# Patient Record
Sex: MALE | Race: WHITE | URBAN - NONMETROPOLITAN AREA
[De-identification: names, ages, dates, MRNs, and addresses within clinical notes are randomized per-mention and may not be internally consistent; named-entity substitution may affect disease eponyms.]

---

## 2023-09-09 PROBLEM — E03.9 ADULT HYPOTHYROIDISM: Status: ACTIVE | Noted: 2023-09-09

## 2023-09-09 PROBLEM — Z95.0 CARDIAC PACEMAKER IN SITU: Status: ACTIVE | Noted: 2023-09-09

## 2023-09-09 PROBLEM — I48.0 PAROXYSMAL ATRIAL FIBRILLATION (MULTI): Status: ACTIVE | Noted: 2023-09-09

## 2023-09-09 PROBLEM — R68.89 ABNORMAL FINDING: Status: ACTIVE | Noted: 2023-09-09

## 2023-09-09 PROBLEM — I48.21 PERMANENT ATRIAL FIBRILLATION (MULTI): Status: ACTIVE | Noted: 2023-09-09

## 2023-09-09 PROBLEM — I49.5 TACHYCARDIA-BRADYCARDIA (MULTI): Status: ACTIVE | Noted: 2023-09-09

## 2023-09-09 PROBLEM — I10 HTN (HYPERTENSION): Status: ACTIVE | Noted: 2023-09-09

## 2023-09-09 PROBLEM — N52.9 ERECTILE DYSFUNCTION: Status: ACTIVE | Noted: 2023-09-09

## 2023-09-09 PROBLEM — D48.19 NEOPLASM OF UNCERTAIN BEHAVIOR OF SOFT TISSUE: Status: ACTIVE | Noted: 2023-09-09

## 2023-09-09 PROBLEM — R97.20 HIGH PROSTATE SPECIFIC ANTIGEN (PSA): Status: ACTIVE | Noted: 2023-09-09

## 2023-09-09 PROBLEM — M11.80 CALCIUM PYROPHOSPHATE ARTHROPATHY: Status: ACTIVE | Noted: 2023-09-09

## 2023-09-09 PROBLEM — I48.11 LONGSTANDING PERSISTENT ATRIAL FIBRILLATION (MULTI): Status: ACTIVE | Noted: 2023-09-09

## 2023-09-09 PROBLEM — N18.31 CKD STAGE G3A/A2, GFR 45-59 AND ALBUMIN CREATININE RATIO 30-299 MG/G (MULTI): Status: ACTIVE | Noted: 2023-09-09

## 2023-09-09 PROBLEM — D48.19 GIANT CELL TUMOR TENDON: Status: ACTIVE | Noted: 2023-09-09

## 2023-09-09 PROBLEM — N40.0 BPH (BENIGN PROSTATIC HYPERPLASIA): Status: ACTIVE | Noted: 2023-09-09

## 2023-09-09 PROBLEM — M17.31 POST-TRAUMATIC OSTEOARTHRITIS OF RIGHT KNEE: Status: ACTIVE | Noted: 2023-09-09

## 2023-09-09 PROBLEM — C44.91 BASAL CELL CARCINOMA: Status: ACTIVE | Noted: 2023-09-09

## 2023-09-09 RX ORDER — LEVOTHYROXINE SODIUM 75 UG/1
75 TABLET ORAL DAILY
COMMUNITY
End: 2023-10-16 | Stop reason: SDUPTHER

## 2023-09-09 RX ORDER — AMOXICILLIN 250 MG
CAPSULE ORAL 2 TIMES DAILY
COMMUNITY
End: 2024-05-17 | Stop reason: WASHOUT

## 2023-09-09 RX ORDER — NAPROXEN SODIUM 220 MG/1
81 TABLET, FILM COATED ORAL DAILY
COMMUNITY
End: 2024-05-17 | Stop reason: WASHOUT

## 2023-09-09 RX ORDER — GLUCOSAMINE/CHONDR SU A SOD 167-133 MG
500 CAPSULE ORAL DAILY
COMMUNITY
End: 2024-05-17 | Stop reason: WASHOUT

## 2023-09-22 ENCOUNTER — TELEPHONE (OUTPATIENT)
Dept: PRIMARY CARE | Facility: CLINIC | Age: 83
End: 2023-09-22
Payer: MEDICARE

## 2023-09-22 DIAGNOSIS — R35.1 BENIGN PROSTATIC HYPERPLASIA WITH NOCTURIA: ICD-10-CM

## 2023-09-22 DIAGNOSIS — I10 PRIMARY HYPERTENSION: ICD-10-CM

## 2023-09-22 DIAGNOSIS — N18.31 CKD STAGE G3A/A2, GFR 45-59 AND ALBUMIN CREATININE RATIO 30-299 MG/G (MULTI): ICD-10-CM

## 2023-09-22 DIAGNOSIS — N40.1 BENIGN PROSTATIC HYPERPLASIA WITH NOCTURIA: ICD-10-CM

## 2023-09-22 DIAGNOSIS — R97.20 HIGH PROSTATE SPECIFIC ANTIGEN (PSA): ICD-10-CM

## 2023-09-22 DIAGNOSIS — E03.9 ADULT HYPOTHYROIDISM: Primary | ICD-10-CM

## 2023-09-25 ENCOUNTER — LAB (OUTPATIENT)
Dept: LAB | Facility: LAB | Age: 83
End: 2023-09-25
Payer: MEDICARE

## 2023-09-25 DIAGNOSIS — I10 PRIMARY HYPERTENSION: ICD-10-CM

## 2023-09-25 DIAGNOSIS — N40.1 BENIGN PROSTATIC HYPERPLASIA WITH NOCTURIA: ICD-10-CM

## 2023-09-25 DIAGNOSIS — R35.1 BENIGN PROSTATIC HYPERPLASIA WITH NOCTURIA: ICD-10-CM

## 2023-09-25 DIAGNOSIS — E03.9 ADULT HYPOTHYROIDISM: ICD-10-CM

## 2023-09-25 DIAGNOSIS — N18.31 CKD STAGE G3A/A2, GFR 45-59 AND ALBUMIN CREATININE RATIO 30-299 MG/G (MULTI): ICD-10-CM

## 2023-09-25 DIAGNOSIS — R97.20 HIGH PROSTATE SPECIFIC ANTIGEN (PSA): ICD-10-CM

## 2023-09-25 LAB
ALANINE AMINOTRANSFERASE (SGPT) (U/L) IN SER/PLAS: 14 U/L (ref 10–52)
ALBUMIN (G/DL) IN SER/PLAS: 3.8 G/DL (ref 3.4–5)
ALKALINE PHOSPHATASE (U/L) IN SER/PLAS: 48 U/L (ref 33–136)
ANION GAP IN SER/PLAS: 12 MMOL/L (ref 10–20)
ASPARTATE AMINOTRANSFERASE (SGOT) (U/L) IN SER/PLAS: 16 U/L (ref 9–39)
BASOPHILS (10*3/UL) IN BLOOD BY AUTOMATED COUNT: 0.07 X10E9/L (ref 0–0.1)
BASOPHILS/100 LEUKOCYTES IN BLOOD BY AUTOMATED COUNT: 0.9 % (ref 0–2)
BILIRUBIN TOTAL (MG/DL) IN SER/PLAS: 0.7 MG/DL (ref 0–1.2)
CALCIUM (MG/DL) IN SER/PLAS: 8.8 MG/DL (ref 8.6–10.3)
CARBON DIOXIDE, TOTAL (MMOL/L) IN SER/PLAS: 28 MMOL/L (ref 21–32)
CHLORIDE (MMOL/L) IN SER/PLAS: 107 MMOL/L (ref 98–107)
CHOLESTEROL (MG/DL) IN SER/PLAS: 193 MG/DL (ref 0–199)
CHOLESTEROL IN HDL (MG/DL) IN SER/PLAS: 28.8 MG/DL
CHOLESTEROL/HDL RATIO: 6.7
CREATININE (MG/DL) IN SER/PLAS: 1.8 MG/DL (ref 0.5–1.3)
EOSINOPHILS (10*3/UL) IN BLOOD BY AUTOMATED COUNT: 0.36 X10E9/L (ref 0–0.4)
EOSINOPHILS/100 LEUKOCYTES IN BLOOD BY AUTOMATED COUNT: 4.6 % (ref 0–6)
ERYTHROCYTE DISTRIBUTION WIDTH (RATIO) BY AUTOMATED COUNT: 14.3 % (ref 11.5–14.5)
ERYTHROCYTE MEAN CORPUSCULAR HEMOGLOBIN CONCENTRATION (G/DL) BY AUTOMATED: 32 G/DL (ref 32–36)
ERYTHROCYTE MEAN CORPUSCULAR VOLUME (FL) BY AUTOMATED COUNT: 93 FL (ref 80–100)
ERYTHROCYTES (10*6/UL) IN BLOOD BY AUTOMATED COUNT: 4.56 X10E12/L (ref 4.5–5.9)
GFR MALE: 37 ML/MIN/1.73M2
GLUCOSE (MG/DL) IN SER/PLAS: 100 MG/DL (ref 74–99)
HEMATOCRIT (%) IN BLOOD BY AUTOMATED COUNT: 42.2 % (ref 41–52)
HEMOGLOBIN (G/DL) IN BLOOD: 13.5 G/DL (ref 13.5–17.5)
IMMATURE GRANULOCYTES/100 LEUKOCYTES IN BLOOD BY AUTOMATED COUNT: 0.3 % (ref 0–0.9)
LDL: 127 MG/DL (ref 0–99)
LEUKOCYTES (10*3/UL) IN BLOOD BY AUTOMATED COUNT: 7.8 X10E9/L (ref 4.4–11.3)
LYMPHOCYTES (10*3/UL) IN BLOOD BY AUTOMATED COUNT: 2.62 X10E9/L (ref 0.8–3)
LYMPHOCYTES/100 LEUKOCYTES IN BLOOD BY AUTOMATED COUNT: 33.6 % (ref 13–44)
MONOCYTES (10*3/UL) IN BLOOD BY AUTOMATED COUNT: 0.74 X10E9/L (ref 0.05–0.8)
MONOCYTES/100 LEUKOCYTES IN BLOOD BY AUTOMATED COUNT: 9.5 % (ref 2–10)
NEUTROPHILS (10*3/UL) IN BLOOD BY AUTOMATED COUNT: 3.99 X10E9/L (ref 1.6–5.5)
NEUTROPHILS/100 LEUKOCYTES IN BLOOD BY AUTOMATED COUNT: 51.1 % (ref 40–80)
PLATELETS (10*3/UL) IN BLOOD AUTOMATED COUNT: 138 X10E9/L (ref 150–450)
POTASSIUM (MMOL/L) IN SER/PLAS: 4.5 MMOL/L (ref 3.5–5.3)
PROTEIN TOTAL: 6.1 G/DL (ref 6.4–8.2)
SODIUM (MMOL/L) IN SER/PLAS: 142 MMOL/L (ref 136–145)
THYROTROPIN (MIU/L) IN SER/PLAS BY DETECTION LIMIT <= 0.05 MIU/L: 1.72 MIU/L (ref 0.44–3.98)
TRIGLYCERIDE (MG/DL) IN SER/PLAS: 184 MG/DL (ref 0–149)
UREA NITROGEN (MG/DL) IN SER/PLAS: 27 MG/DL (ref 6–23)
VLDL: 37 MG/DL (ref 0–40)

## 2023-09-25 PROCEDURE — 84443 ASSAY THYROID STIM HORMONE: CPT

## 2023-09-25 PROCEDURE — 36415 COLL VENOUS BLD VENIPUNCTURE: CPT

## 2023-09-25 PROCEDURE — 84154 ASSAY OF PSA FREE: CPT

## 2023-09-25 PROCEDURE — 84153 ASSAY OF PSA TOTAL: CPT

## 2023-09-25 PROCEDURE — 80053 COMPREHEN METABOLIC PANEL: CPT

## 2023-09-25 PROCEDURE — 80061 LIPID PANEL: CPT

## 2023-09-25 PROCEDURE — 85025 COMPLETE CBC W/AUTO DIFF WBC: CPT

## 2023-09-28 LAB
PROSTATE SPECIFIC AG (NG/ML) IN SER/PLAS: 6.6 NG/ML (ref 0–4)
PROSTATE SPECIFIC AG FREE (NG/ML) IN SER/PLAS: 1.9 NG/ML
PROSTATE SPECIFIC AG FREE/PROSTATE SPECIFIC AG TOTAL IN SER/PLAS: 29 %

## 2023-10-02 ENCOUNTER — TELEPHONE (OUTPATIENT)
Dept: PRIMARY CARE | Facility: CLINIC | Age: 83
End: 2023-10-02
Payer: MEDICARE

## 2023-10-02 NOTE — TELEPHONE ENCOUNTER
Josie Payne, DO Zandra Smith MA  THYROID IS STABLE  KIDNEY FUNCTION IS DECLINING  LIPID PANEL TOTAL IS GOOD  HDL IS LOW WHICH IS NOT GOOD BUT THIS IS WAY IT USUALLY IS  PSA HAS ACTUALLY GONE DOWN 6.6 WAS 13 LAST YEAR

## 2023-10-12 DIAGNOSIS — I48.11 LONGSTANDING PERSISTENT ATRIAL FIBRILLATION (MULTI): ICD-10-CM

## 2023-10-12 DIAGNOSIS — E03.9 ACQUIRED HYPOTHYROIDISM: ICD-10-CM

## 2023-10-16 RX ORDER — CARVEDILOL 25 MG/1
TABLET ORAL
Qty: 180 TABLET | Refills: 0 | Status: SHIPPED | OUTPATIENT
Start: 2023-10-16 | End: 2024-01-30

## 2023-10-16 RX ORDER — WARFARIN 7.5 MG/1
TABLET ORAL
Qty: 90 TABLET | Refills: 0 | Status: SHIPPED | OUTPATIENT
Start: 2023-10-16 | End: 2024-01-30

## 2023-10-16 RX ORDER — LEVOTHYROXINE SODIUM 75 UG/1
TABLET ORAL
Qty: 90 TABLET | Refills: 0 | Status: SHIPPED | OUTPATIENT
Start: 2023-10-16 | End: 2024-01-30

## 2024-01-30 DIAGNOSIS — E03.9 ACQUIRED HYPOTHYROIDISM: ICD-10-CM

## 2024-01-30 DIAGNOSIS — I48.11 LONGSTANDING PERSISTENT ATRIAL FIBRILLATION (MULTI): ICD-10-CM

## 2024-01-30 RX ORDER — LEVOTHYROXINE SODIUM 75 UG/1
TABLET ORAL
Qty: 90 TABLET | Refills: 0 | Status: SHIPPED | OUTPATIENT
Start: 2024-01-30 | End: 2024-05-10

## 2024-01-30 RX ORDER — WARFARIN 7.5 MG/1
TABLET ORAL
Qty: 90 TABLET | Refills: 0 | Status: SHIPPED | OUTPATIENT
Start: 2024-01-30 | End: 2024-05-07 | Stop reason: SDUPTHER

## 2024-01-30 RX ORDER — CARVEDILOL 25 MG/1
TABLET ORAL
Qty: 180 TABLET | Refills: 0 | Status: SHIPPED | OUTPATIENT
Start: 2024-01-30 | End: 2024-05-07 | Stop reason: SDUPTHER

## 2024-03-21 ENCOUNTER — HOSPITAL ENCOUNTER (OUTPATIENT)
Dept: CARDIOLOGY | Facility: CLINIC | Age: 84
Discharge: HOME | End: 2024-03-21
Payer: MEDICARE

## 2024-03-21 DIAGNOSIS — I48.0 PAROXYSMAL ATRIAL FIBRILLATION (MULTI): ICD-10-CM

## 2024-03-21 DIAGNOSIS — I49.5 TACHYCARDIA-BRADYCARDIA (MULTI): ICD-10-CM

## 2024-03-21 DIAGNOSIS — Z95.0 PRESENCE OF CARDIAC PACEMAKER: Primary | ICD-10-CM

## 2024-03-21 DIAGNOSIS — Z95.0 PRESENCE OF CARDIAC PACEMAKER: ICD-10-CM

## 2024-03-21 PROCEDURE — 93280 PM DEVICE PROGR EVAL DUAL: CPT

## 2024-03-21 PROCEDURE — 93280 PM DEVICE PROGR EVAL DUAL: CPT | Performed by: INTERNAL MEDICINE

## 2024-05-04 DIAGNOSIS — I48.11 LONGSTANDING PERSISTENT ATRIAL FIBRILLATION (MULTI): ICD-10-CM

## 2024-05-06 NOTE — TELEPHONE ENCOUNTER
PLEASE CALL [place].  HE NEEDS SEEN. WAS SUPPOSED TO COME IN WHEN HE HAD HIS LAST LABS DRAWN IN 9/23.

## 2024-05-07 ENCOUNTER — CLINICAL SUPPORT (OUTPATIENT)
Dept: WOUND CARE | Facility: HOSPITAL | Age: 84
End: 2024-05-07
Payer: MEDICARE

## 2024-05-07 DIAGNOSIS — S40.812A ABRASION OF LEFT ARM, INITIAL ENCOUNTER: ICD-10-CM

## 2024-05-07 PROCEDURE — 99213 OFFICE O/P EST LOW 20 MIN: CPT | Mod: 25

## 2024-05-07 PROCEDURE — 11045 DBRDMT SUBQ TISS EACH ADDL: CPT

## 2024-05-07 PROCEDURE — 11042 DBRDMT SUBQ TIS 1ST 20SQCM/<: CPT

## 2024-05-07 RX ORDER — WARFARIN 7.5 MG/1
7.5 TABLET ORAL NIGHTLY
Qty: 30 TABLET | Refills: 0 | Status: SHIPPED | OUTPATIENT
Start: 2024-05-07 | End: 2024-05-17 | Stop reason: SDUPTHER

## 2024-05-07 RX ORDER — CARVEDILOL 25 MG/1
25 TABLET ORAL
Qty: 60 TABLET | Refills: 0 | Status: SHIPPED | OUTPATIENT
Start: 2024-05-07 | End: 2024-05-17 | Stop reason: SDUPTHER

## 2024-05-07 NOTE — TELEPHONE ENCOUNTER
Appointment made.  Donavon has asked if him medications will be sent over to the pharmacy today?

## 2024-05-10 DIAGNOSIS — E03.9 ACQUIRED HYPOTHYROIDISM: ICD-10-CM

## 2024-05-10 RX ORDER — LEVOTHYROXINE SODIUM 75 UG/1
TABLET ORAL
Qty: 90 TABLET | Refills: 0 | Status: SHIPPED | OUTPATIENT
Start: 2024-05-10 | End: 2024-05-17 | Stop reason: SDUPTHER

## 2024-05-14 ENCOUNTER — CLINICAL SUPPORT (OUTPATIENT)
Dept: WOUND CARE | Facility: HOSPITAL | Age: 84
End: 2024-05-14
Payer: MEDICARE

## 2024-05-14 PROCEDURE — 99213 OFFICE O/P EST LOW 20 MIN: CPT

## 2024-05-17 ENCOUNTER — OFFICE VISIT (OUTPATIENT)
Dept: PRIMARY CARE | Facility: CLINIC | Age: 84
End: 2024-05-17
Payer: MEDICARE

## 2024-05-17 VITALS
TEMPERATURE: 97.3 F | BODY MASS INDEX: 30.37 KG/M2 | DIASTOLIC BLOOD PRESSURE: 68 MMHG | WEIGHT: 189 LBS | OXYGEN SATURATION: 99 % | SYSTOLIC BLOOD PRESSURE: 150 MMHG | HEART RATE: 73 BPM | HEIGHT: 66 IN

## 2024-05-17 DIAGNOSIS — Z00.00 ROUTINE GENERAL MEDICAL EXAMINATION AT HEALTH CARE FACILITY: Primary | ICD-10-CM

## 2024-05-17 DIAGNOSIS — I48.11 LONGSTANDING PERSISTENT ATRIAL FIBRILLATION (MULTI): ICD-10-CM

## 2024-05-17 DIAGNOSIS — E03.9 ACQUIRED HYPOTHYROIDISM: ICD-10-CM

## 2024-05-17 PROCEDURE — 1123F ACP DISCUSS/DSCN MKR DOCD: CPT | Performed by: FAMILY MEDICINE

## 2024-05-17 PROCEDURE — 3078F DIAST BP <80 MM HG: CPT | Performed by: FAMILY MEDICINE

## 2024-05-17 PROCEDURE — 3077F SYST BP >= 140 MM HG: CPT | Performed by: FAMILY MEDICINE

## 2024-05-17 PROCEDURE — 1158F ADVNC CARE PLAN TLK DOCD: CPT | Performed by: FAMILY MEDICINE

## 2024-05-17 PROCEDURE — 1159F MED LIST DOCD IN RCRD: CPT | Performed by: FAMILY MEDICINE

## 2024-05-17 PROCEDURE — 1170F FXNL STATUS ASSESSED: CPT | Performed by: FAMILY MEDICINE

## 2024-05-17 PROCEDURE — G0439 PPPS, SUBSEQ VISIT: HCPCS | Performed by: FAMILY MEDICINE

## 2024-05-17 RX ORDER — CARVEDILOL 25 MG/1
25 TABLET ORAL
Qty: 180 TABLET | Refills: 3 | Status: SHIPPED | OUTPATIENT
Start: 2024-05-17 | End: 2025-05-17

## 2024-05-17 RX ORDER — TAMSULOSIN HYDROCHLORIDE 0.4 MG/1
0.4 CAPSULE ORAL
COMMUNITY
Start: 2024-04-25 | End: 2025-04-20

## 2024-05-17 RX ORDER — WARFARIN 7.5 MG/1
7.5 TABLET ORAL NIGHTLY
Qty: 90 TABLET | Refills: 3 | Status: SHIPPED | OUTPATIENT
Start: 2024-05-17 | End: 2025-05-17

## 2024-05-17 RX ORDER — SACUBITRIL AND VALSARTAN 24; 26 MG/1; MG/1
1 TABLET, FILM COATED ORAL 2 TIMES DAILY
COMMUNITY

## 2024-05-17 RX ORDER — LEVOTHYROXINE SODIUM 75 UG/1
TABLET ORAL
Qty: 90 TABLET | Refills: 3 | Status: SHIPPED | OUTPATIENT
Start: 2024-05-17

## 2024-05-17 RX ORDER — SOLIFENACIN SUCCINATE 5 MG/1
10 TABLET, FILM COATED ORAL
COMMUNITY

## 2024-05-17 ASSESSMENT — ENCOUNTER SYMPTOMS
LOSS OF SENSATION IN FEET: 0
OCCASIONAL FEELINGS OF UNSTEADINESS: 1
DEPRESSION: 0

## 2024-05-17 ASSESSMENT — ACTIVITIES OF DAILY LIVING (ADL)
DOING_HOUSEWORK: INDEPENDENT
GROCERY_SHOPPING: INDEPENDENT
TAKING_MEDICATION: INDEPENDENT
MANAGING_FINANCES: INDEPENDENT
DRESSING: INDEPENDENT
BATHING: INDEPENDENT

## 2024-05-17 ASSESSMENT — PATIENT HEALTH QUESTIONNAIRE - PHQ9
2. FEELING DOWN, DEPRESSED OR HOPELESS: NOT AT ALL
1. LITTLE INTEREST OR PLEASURE IN DOING THINGS: NOT AT ALL
SUM OF ALL RESPONSES TO PHQ9 QUESTIONS 1 AND 2: 0

## 2024-05-17 NOTE — PROGRESS NOTES
"Subjective   Patient ID: Donavon Brush is a 83 y.o. male who presents for Medicare Annual Wellness Visit Subsequent.    HPI FOLLOWS WITH UROLOGY  CARDIOLOGY /A FIB AND CHRONIC CHF   HEARD OF AN HORRIFIC EVENT WERE A BRUSH HOG FELL ON THE PATIENT AND HE HELD IT UP WITH HIS OWN STRENGTH AND GOT OUT FROM UNDER IT. HE COULD HAVE BEEN CRUSHED AND THIS A FATAL ACCIDENT. HE WAS FORTUNATE     Review of Systems   Constitutional:  Negative for activity change, appetite change, fever and unexpected weight change.   HENT:  Positive for hearing loss. Negative for congestion, ear pain, postnasal drip, rhinorrhea, sinus pressure and sore throat.    Eyes:  Negative for discharge, itching and visual disturbance.   Respiratory:  Negative for cough, shortness of breath and wheezing.    Cardiovascular:  Negative for chest pain, palpitations and leg swelling.   Gastrointestinal:  Negative for abdominal pain, blood in stool, constipation, diarrhea, nausea and vomiting.   Endocrine: Negative for cold intolerance, heat intolerance and polyuria.   Genitourinary:  Negative for dysuria, flank pain and hematuria.   Musculoskeletal:  Positive for arthralgias and back pain. Negative for gait problem, joint swelling and myalgias.        CHRONICALLY NOT SINCE ACCIDENT    Skin:  Negative for rash.   Allergic/Immunologic: Negative for environmental allergies and food allergies.   Neurological:  Negative for dizziness, syncope, weakness, light-headedness, numbness and headaches.   Psychiatric/Behavioral:  Negative for dysphoric mood and sleep disturbance. The patient is not nervous/anxious.        Objective   /68   Pulse 73   Temp 36.3 °C (97.3 °F)   Ht 1.683 m (5' 6.25\")   Wt 85.7 kg (189 lb)   SpO2 99%   BMI 30.28 kg/m²     Physical Exam  Vitals and nursing note reviewed.   Constitutional:       Appearance: Normal appearance.   HENT:      Head: Normocephalic.   Cardiovascular:      Rate and Rhythm: Normal rate. Rhythm irregular.      " Pulses: Normal pulses.      Heart sounds: Normal heart sounds. No murmur heard.     No friction rub. No gallop.      Comments: CONTROLLED AFIB PACER   Pulmonary:      Effort: Pulmonary effort is normal. No respiratory distress.      Breath sounds: Normal breath sounds. No wheezing.   Abdominal:      General: Bowel sounds are normal. There is no distension.      Palpations: Abdomen is soft.      Tenderness: There is no abdominal tenderness.   Musculoskeletal:         General: No deformity. Normal range of motion.   Skin:     General: Skin is warm and dry.      Capillary Refill: Capillary refill takes less than 2 seconds.   Neurological:      General: No focal deficit present.      Mental Status: He is alert and oriented to person, place, and time.   Psychiatric:         Mood and Affect: Mood normal.         Assessment/Plan   Problem List Items Addressed This Visit             ICD-10-CM    Longstanding persistent atrial fibrillation (Multi) I48.11    Relevant Medications    sacubitriL-valsartan (Entresto) 24-26 mg tablet    carvedilol (Coreg) 25 mg tablet    warfarin (Coumadin) 7.5 mg tablet    Acquired hypothyroidism E03.9    Relevant Medications    levothyroxine (Euthyrox) 75 mcg tablet    Routine general medical examination at health care facility - Primary Z00.00

## 2024-05-27 PROBLEM — Z00.00 ROUTINE GENERAL MEDICAL EXAMINATION AT HEALTH CARE FACILITY: Status: ACTIVE | Noted: 2024-05-27

## 2024-05-27 PROBLEM — E03.9 ACQUIRED HYPOTHYROIDISM: Status: ACTIVE | Noted: 2024-05-27

## 2024-05-27 ASSESSMENT — ENCOUNTER SYMPTOMS
RHINORRHEA: 0
WEAKNESS: 0
DYSURIA: 0
SORE THROAT: 0
DIZZINESS: 0
FLANK PAIN: 0
ARTHRALGIAS: 1
ACTIVITY CHANGE: 0
UNEXPECTED WEIGHT CHANGE: 0
BACK PAIN: 1
SHORTNESS OF BREATH: 0
MYALGIAS: 0
HEMATURIA: 0
BLOOD IN STOOL: 0
HEADACHES: 0
FEVER: 0
NERVOUS/ANXIOUS: 0
COUGH: 0
APPETITE CHANGE: 0
VOMITING: 0
DIARRHEA: 0
SINUS PRESSURE: 0
CONSTIPATION: 0
EYE ITCHING: 0
EYE DISCHARGE: 0
NUMBNESS: 0
JOINT SWELLING: 0
DYSPHORIC MOOD: 0
SLEEP DISTURBANCE: 0
ABDOMINAL PAIN: 0
PALPITATIONS: 0
NAUSEA: 0
LIGHT-HEADEDNESS: 0
WHEEZING: 0

## 2024-07-15 ENCOUNTER — APPOINTMENT (OUTPATIENT)
Dept: PRIMARY CARE | Facility: CLINIC | Age: 84
End: 2024-07-15
Payer: MEDICARE

## 2024-07-15 VITALS
SYSTOLIC BLOOD PRESSURE: 138 MMHG | HEART RATE: 70 BPM | DIASTOLIC BLOOD PRESSURE: 84 MMHG | OXYGEN SATURATION: 97 % | WEIGHT: 187 LBS | BODY MASS INDEX: 29.96 KG/M2 | TEMPERATURE: 96.3 F

## 2024-07-15 DIAGNOSIS — Z00.8 ENCOUNTER FOR MEDICAL CLEARANCE FOR PATIENT HOLD: ICD-10-CM

## 2024-07-15 DIAGNOSIS — R39.14 BENIGN PROSTATIC HYPERPLASIA WITH INCOMPLETE BLADDER EMPTYING: Primary | ICD-10-CM

## 2024-07-15 DIAGNOSIS — N40.1 BENIGN PROSTATIC HYPERPLASIA WITH INCOMPLETE BLADDER EMPTYING: Primary | ICD-10-CM

## 2024-07-15 PROCEDURE — 1159F MED LIST DOCD IN RCRD: CPT | Performed by: FAMILY MEDICINE

## 2024-07-15 PROCEDURE — 3079F DIAST BP 80-89 MM HG: CPT | Performed by: FAMILY MEDICINE

## 2024-07-15 PROCEDURE — 1036F TOBACCO NON-USER: CPT | Performed by: FAMILY MEDICINE

## 2024-07-15 PROCEDURE — 99214 OFFICE O/P EST MOD 30 MIN: CPT | Performed by: FAMILY MEDICINE

## 2024-07-15 PROCEDURE — 3075F SYST BP GE 130 - 139MM HG: CPT | Performed by: FAMILY MEDICINE

## 2024-07-15 ASSESSMENT — ENCOUNTER SYMPTOMS
EYE DISCHARGE: 0
BLOOD IN STOOL: 0
MYALGIAS: 0
RHINORRHEA: 0
WEAKNESS: 0
SHORTNESS OF BREATH: 0
UNEXPECTED WEIGHT CHANGE: 0
DIZZINESS: 0
LIGHT-HEADEDNESS: 0
WHEEZING: 0
NUMBNESS: 0
DIFFICULTY URINATING: 1
ACTIVITY CHANGE: 0
DYSURIA: 0
JOINT SWELLING: 0
HEADACHES: 0
HEMATURIA: 0
EYE ITCHING: 0
SORE THROAT: 0
FLANK PAIN: 0
VOMITING: 0
DIARRHEA: 0
SLEEP DISTURBANCE: 0
ABDOMINAL PAIN: 0
SINUS PRESSURE: 0
DYSPHORIC MOOD: 0
NAUSEA: 0
NERVOUS/ANXIOUS: 0
COUGH: 0
CONSTIPATION: 0
APPETITE CHANGE: 0
FEVER: 0
PALPITATIONS: 1
ARTHRALGIAS: 0

## 2024-07-15 NOTE — PROGRESS NOTES
Subjective   Patient ID: Donavon Brush is a 83 y.o. male who presents for SURGICAL CLEARANCE (SCHEDULED 8/6/24 FOR TRANSURETHRAL RESECTION OF PROSTATE THROUGH [facility]. NEEDS CLEARED WITH COUMADIN).    HPI WILL GET PRE-OP TESTING THIS WEEK   WILL HOLD THE COUMADIN FOR 5 DAYS PRIOR TO THE SURGERY   GETTING PROSTATECTOMY SOON   FOLLOWS CARDIOLOGY     Review of Systems   Constitutional:  Negative for activity change, appetite change, fever and unexpected weight change.   HENT:  Negative for congestion, ear pain, postnasal drip, rhinorrhea, sinus pressure and sore throat.    Eyes:  Negative for discharge, itching and visual disturbance.   Respiratory:  Negative for cough, shortness of breath and wheezing.    Cardiovascular:  Positive for palpitations. Negative for chest pain and leg swelling.   Gastrointestinal:  Negative for abdominal pain, blood in stool, constipation, diarrhea, nausea and vomiting.   Endocrine: Negative for cold intolerance, heat intolerance and polyuria.   Genitourinary:  Positive for difficulty urinating. Negative for dysuria, flank pain and hematuria.   Musculoskeletal:  Negative for arthralgias, gait problem, joint swelling and myalgias.   Skin:  Negative for rash.   Allergic/Immunologic: Negative for environmental allergies and food allergies.   Neurological:  Negative for dizziness, syncope, weakness, light-headedness, numbness and headaches.   Psychiatric/Behavioral:  Negative for dysphoric mood and sleep disturbance. The patient is not nervous/anxious.        Objective   /84   Pulse 70   Temp 35.7 °C (96.3 °F) (Temporal)   Wt 84.8 kg (187 lb)   SpO2 97%   BMI 29.96 kg/m²     Physical Exam  Vitals and nursing note reviewed.   Constitutional:       Appearance: Normal appearance.   HENT:      Head: Normocephalic.   Cardiovascular:      Rate and Rhythm: Normal rate. Rhythm irregular.      Pulses: Normal pulses.      Heart sounds: Normal heart sounds. No murmur heard.     No friction rub. No  gallop.   Pulmonary:      Effort: Pulmonary effort is normal. No respiratory distress.      Breath sounds: Normal breath sounds. No wheezing.   Abdominal:      General: There is no distension.      Tenderness: There is no abdominal tenderness.   Musculoskeletal:         General: No deformity. Normal range of motion.      Right lower leg: Edema present.      Left lower leg: Edema present.   Skin:     General: Skin is warm and dry.      Capillary Refill: Capillary refill takes less than 2 seconds.   Neurological:      General: No focal deficit present.      Mental Status: He is alert and oriented to person, place, and time.   Psychiatric:         Mood and Affect: Mood normal.         Assessment/Plan   Problem List Items Addressed This Visit             ICD-10-CM    BPH (benign prostatic hyperplasia) - Primary N40.0    Encounter for medical clearance for patient hold Z00.8

## 2024-08-23 ENCOUNTER — OFFICE VISIT (OUTPATIENT)
Dept: PRIMARY CARE | Facility: CLINIC | Age: 84
End: 2024-08-23
Payer: MEDICARE

## 2024-08-23 VITALS
HEART RATE: 60 BPM | SYSTOLIC BLOOD PRESSURE: 136 MMHG | TEMPERATURE: 97.2 F | BODY MASS INDEX: 29.47 KG/M2 | DIASTOLIC BLOOD PRESSURE: 68 MMHG | OXYGEN SATURATION: 97 % | WEIGHT: 184 LBS

## 2024-08-23 DIAGNOSIS — I48.0 PAROXYSMAL ATRIAL FIBRILLATION (MULTI): Primary | ICD-10-CM

## 2024-08-23 DIAGNOSIS — H10.33 ACUTE BACTERIAL CONJUNCTIVITIS OF BOTH EYES: ICD-10-CM

## 2024-08-23 PROCEDURE — 99214 OFFICE O/P EST MOD 30 MIN: CPT | Performed by: FAMILY MEDICINE

## 2024-08-23 PROCEDURE — 1159F MED LIST DOCD IN RCRD: CPT | Performed by: FAMILY MEDICINE

## 2024-08-23 PROCEDURE — 3078F DIAST BP <80 MM HG: CPT | Performed by: FAMILY MEDICINE

## 2024-08-23 PROCEDURE — 3075F SYST BP GE 130 - 139MM HG: CPT | Performed by: FAMILY MEDICINE

## 2024-08-23 RX ORDER — ENOXAPARIN SODIUM 100 MG/ML
30 INJECTION SUBCUTANEOUS 2 TIMES DAILY
Qty: 15 EACH | Refills: 0 | Status: SHIPPED | OUTPATIENT
Start: 2024-08-23

## 2024-08-23 RX ORDER — ERYTHROMYCIN 5 MG/G
OINTMENT OPHTHALMIC 4 TIMES DAILY
Qty: 3.5 G | Refills: 0 | Status: SHIPPED | OUTPATIENT
Start: 2024-08-23 | End: 2024-08-30

## 2024-08-23 NOTE — PROGRESS NOTES
Subjective   Patient ID: Donavon Brush is a 84 y.o. male who presents for Eye Drainage, Pre-op Exam (PROSTATE- SIMINOVITCH- WAS TOLD HE SHOULD HAVE LOVENOX-WOULD LIKE TO DISCUSS), and Labs Only (WOULD LIKE TO HAVE A LAB ORDER FOR PT/INR).    HPI OFF COUMADIN OR IN HALF?  9-3-24 /TURP COMPLETE IS PLANNED   NEEDS BRIDGING   MAINLY CAME WITH LEFT EYE INFECTION    Review of Systems   Constitutional:  Negative for activity change, appetite change, fever and unexpected weight change.   HENT:  Positive for ear discharge. Negative for congestion, ear pain, postnasal drip, rhinorrhea, sinus pressure and sore throat.    Eyes:  Negative for discharge, itching and visual disturbance.   Respiratory:  Negative for cough, shortness of breath and wheezing.    Cardiovascular:  Positive for palpitations. Negative for chest pain and leg swelling.   Gastrointestinal:  Negative for abdominal pain, blood in stool, constipation, diarrhea, nausea and vomiting.   Endocrine: Negative for cold intolerance, heat intolerance and polyuria.   Genitourinary:  Positive for difficulty urinating. Negative for dysuria, flank pain and hematuria.   Musculoskeletal:  Negative for arthralgias, gait problem, joint swelling and myalgias.   Skin:  Negative for rash.   Allergic/Immunologic: Negative for environmental allergies and food allergies.   Neurological:  Negative for dizziness, syncope, weakness, light-headedness, numbness and headaches.   Psychiatric/Behavioral:  Negative for dysphoric mood and sleep disturbance. The patient is not nervous/anxious.        Objective   /68   Pulse 60   Temp 36.2 °C (97.2 °F)   Wt 83.5 kg (184 lb)   SpO2 97%   BMI 29.47 kg/m²     Physical Exam  Vitals and nursing note reviewed.   Constitutional:       Appearance: Normal appearance.   HENT:      Head: Normocephalic.   Eyes:      General:         Left eye: Discharge present.     Pupils: Pupils are equal, round, and reactive to light.      Comments: PATIENT HAS  SUNKEN LEFT EYE AND DR PERKINS TOLD HIM HE HAD MUSCLE WEAKNESS AND REFERRED TO AND OPHTHALMOLOGIST   PATIENT IS RELUCTANT TO GO SINCE HE THINKS IT IS JUST INFECTION AND THERE IS REDNESS AND DRAINAGE    Cardiovascular:      Rate and Rhythm: Normal rate and regular rhythm.      Pulses: Normal pulses.      Heart sounds: Normal heart sounds. No murmur heard.     No friction rub. No gallop.   Pulmonary:      Effort: Pulmonary effort is normal. No respiratory distress.      Breath sounds: Normal breath sounds. No wheezing.   Abdominal:      General: Bowel sounds are normal. There is no distension.      Palpations: Abdomen is soft.      Tenderness: There is no abdominal tenderness.   Musculoskeletal:         General: No deformity. Normal range of motion.   Skin:     General: Skin is warm and dry.      Capillary Refill: Capillary refill takes less than 2 seconds.   Neurological:      General: No focal deficit present.      Mental Status: He is alert and oriented to person, place, and time.   Psychiatric:         Mood and Affect: Mood normal.         Assessment/Plan   Problem List Items Addressed This Visit             ICD-10-CM    Paroxysmal atrial fibrillation (Multi) - Primary I48.0    Relevant Medications    enoxaparin (Lovenox) 30 mg/0.3 mL syringe    Other Relevant Orders    Protime-INR     Other Visit Diagnoses         Codes    Acute bacterial conjunctivitis of both eyes     H10.33    Relevant Medications    erythromycin (Romycin) 5 mg/gram (0.5 %) ophthalmic ointment

## 2024-08-28 ASSESSMENT — ENCOUNTER SYMPTOMS
NUMBNESS: 0
COUGH: 0
ABDOMINAL PAIN: 0
SLEEP DISTURBANCE: 0
DYSURIA: 0
HEMATURIA: 0
JOINT SWELLING: 0
MYALGIAS: 0
SORE THROAT: 0
SINUS PRESSURE: 0
WHEEZING: 0
NERVOUS/ANXIOUS: 0
DIZZINESS: 0
RHINORRHEA: 0
UNEXPECTED WEIGHT CHANGE: 0
FLANK PAIN: 0
DIARRHEA: 0
APPETITE CHANGE: 0
ACTIVITY CHANGE: 0
VOMITING: 0
CONSTIPATION: 0
BLOOD IN STOOL: 0
FEVER: 0
ARTHRALGIAS: 0
EYE ITCHING: 0
SHORTNESS OF BREATH: 0
PALPITATIONS: 1
HEADACHES: 0
DYSPHORIC MOOD: 0
EYE DISCHARGE: 0
WEAKNESS: 0
DIFFICULTY URINATING: 1
LIGHT-HEADEDNESS: 0
NAUSEA: 0

## 2024-09-04 ENCOUNTER — HOSPITAL ENCOUNTER (OUTPATIENT)
Dept: CARDIOLOGY | Facility: CLINIC | Age: 84
Discharge: HOME | End: 2024-09-04
Payer: MEDICARE

## 2024-09-04 DIAGNOSIS — I49.5 TACHYCARDIA-BRADYCARDIA (MULTI): ICD-10-CM

## 2024-09-04 DIAGNOSIS — Z95.0 PRESENCE OF CARDIAC PACEMAKER: ICD-10-CM

## 2024-09-04 DIAGNOSIS — I48.0 PAROXYSMAL ATRIAL FIBRILLATION (MULTI): ICD-10-CM

## 2024-09-13 ENCOUNTER — TELEPHONE (OUTPATIENT)
Dept: CARDIOLOGY | Facility: CLINIC | Age: 84
End: 2024-09-13
Payer: MEDICARE

## 2024-09-17 ENCOUNTER — TELEPHONE (OUTPATIENT)
Dept: PRIMARY CARE | Facility: CLINIC | Age: 84
End: 2024-09-17
Payer: MEDICARE

## 2024-09-28 ENCOUNTER — HOSPITAL ENCOUNTER (OUTPATIENT)
Dept: RADIOLOGY | Facility: HOSPITAL | Age: 84
Discharge: HOME | End: 2024-09-28
Payer: MEDICARE

## 2024-09-28 DIAGNOSIS — H53.2 DIPLOPIA: ICD-10-CM

## 2024-09-28 PROCEDURE — 70480 CT ORBIT/EAR/FOSSA W/O DYE: CPT | Performed by: RADIOLOGY

## 2024-09-28 PROCEDURE — 70480 CT ORBIT/EAR/FOSSA W/O DYE: CPT

## 2024-10-01 ENCOUNTER — TELEPHONE (OUTPATIENT)
Dept: CARDIOLOGY | Facility: CLINIC | Age: 84
End: 2024-10-01
Payer: MEDICARE

## 2024-10-17 ENCOUNTER — APPOINTMENT (OUTPATIENT)
Dept: CARDIOLOGY | Facility: CLINIC | Age: 84
End: 2024-10-17
Payer: MEDICARE

## 2024-10-28 ENCOUNTER — TELEPHONE (OUTPATIENT)
Dept: CARDIOLOGY | Facility: CLINIC | Age: 84
End: 2024-10-28
Payer: MEDICARE

## 2024-11-08 ENCOUNTER — HOSPITAL ENCOUNTER (OUTPATIENT)
Dept: RADIOLOGY | Facility: HOSPITAL | Age: 84
Discharge: HOME | End: 2024-11-08
Payer: MEDICARE

## 2024-11-08 ENCOUNTER — HOSPITAL ENCOUNTER (OUTPATIENT)
Dept: CARDIOLOGY | Facility: CLINIC | Age: 84
Discharge: HOME | End: 2024-11-08
Payer: MEDICARE

## 2024-11-08 DIAGNOSIS — Z95.0 PRESENCE OF CARDIAC PACEMAKER: Primary | ICD-10-CM

## 2024-11-08 DIAGNOSIS — Z95.0 PRESENCE OF CARDIAC PACEMAKER: ICD-10-CM

## 2024-11-08 DIAGNOSIS — I48.0 PAROXYSMAL ATRIAL FIBRILLATION (MULTI): ICD-10-CM

## 2024-11-08 DIAGNOSIS — I49.5 TACHYCARDIA-BRADYCARDIA (MULTI): ICD-10-CM

## 2024-11-08 PROCEDURE — 71046 X-RAY EXAM CHEST 2 VIEWS: CPT

## 2024-11-08 PROCEDURE — 93280 PM DEVICE PROGR EVAL DUAL: CPT

## 2024-11-18 ENCOUNTER — TELEPHONE (OUTPATIENT)
Dept: CARDIOLOGY | Facility: CLINIC | Age: 84
End: 2024-11-18
Payer: MEDICARE

## 2024-12-10 ENCOUNTER — OFFICE VISIT (OUTPATIENT)
Dept: CARDIOLOGY | Facility: CLINIC | Age: 84
End: 2024-12-10
Payer: MEDICARE

## 2024-12-10 VITALS
SYSTOLIC BLOOD PRESSURE: 170 MMHG | BODY MASS INDEX: 29.97 KG/M2 | WEIGHT: 187.1 LBS | HEART RATE: 60 BPM | DIASTOLIC BLOOD PRESSURE: 82 MMHG | OXYGEN SATURATION: 99 %

## 2024-12-10 DIAGNOSIS — I35.0 AORTIC VALVE STENOSIS, ETIOLOGY OF CARDIAC VALVE DISEASE UNSPECIFIED: Primary | ICD-10-CM

## 2024-12-10 DIAGNOSIS — I50.9 OTHER CONGESTIVE HEART FAILURE: ICD-10-CM

## 2024-12-10 DIAGNOSIS — I10 HYPERTENSION, UNSPECIFIED TYPE: ICD-10-CM

## 2024-12-10 PROCEDURE — 99214 OFFICE O/P EST MOD 30 MIN: CPT | Performed by: NURSE PRACTITIONER

## 2024-12-10 PROCEDURE — 1036F TOBACCO NON-USER: CPT | Performed by: NURSE PRACTITIONER

## 2024-12-10 PROCEDURE — 3077F SYST BP >= 140 MM HG: CPT | Performed by: NURSE PRACTITIONER

## 2024-12-10 PROCEDURE — 1159F MED LIST DOCD IN RCRD: CPT | Performed by: NURSE PRACTITIONER

## 2024-12-10 PROCEDURE — 99204 OFFICE O/P NEW MOD 45 MIN: CPT | Performed by: NURSE PRACTITIONER

## 2024-12-10 PROCEDURE — 3079F DIAST BP 80-89 MM HG: CPT | Performed by: NURSE PRACTITIONER

## 2024-12-10 ASSESSMENT — ENCOUNTER SYMPTOMS
NEUROLOGICAL NEGATIVE: 1
CONSTITUTIONAL NEGATIVE: 1
GASTROINTESTINAL NEGATIVE: 1
ENDOCRINE NEGATIVE: 1
RESPIRATORY NEGATIVE: 1
MUSCULOSKELETAL NEGATIVE: 1
PSYCHIATRIC NEGATIVE: 1
CARDIOVASCULAR NEGATIVE: 1
HEMATOLOGIC/LYMPHATIC NEGATIVE: 1
EYES NEGATIVE: 1

## 2024-12-10 NOTE — PROGRESS NOTES
Referred by Dr. Roth ref. provider found for New Patient Visit (Establish care.  Previous cardiologist retired.)     History Of Present Illness:    Dear Dr. Payne,     I had the pleasure of meeting Mr. Brush today at [facility] to establish cardiac care.  The patient is seen in collaboration with Dr. Driscoll. Mr. Brush is a very pleasant 84 year old gentleman with a history of chronic atrial fibrillation (Coumadin), hypothyroidism, AVR 20 years ago, PPM, prostate cancer, HTN, hypothyroidism, HFrEF (LVEF 40%) and knee surgery, former smoker. He states overall he has been feeling well. He continues to stay active walking on a regular basis. Denies chest pain, shortness of breath or heart palpitations. Checks Coumadin at home. Blood pressure at home has been well controlled. Scheduled for an MRI for his eye     Review of Systems   Constitutional: Negative.   HENT: Negative.     Eyes: Negative.    Cardiovascular: Negative.    Respiratory: Negative.     Endocrine: Negative.    Hematologic/Lymphatic: Negative.    Skin: Negative.    Musculoskeletal: Negative.    Gastrointestinal: Negative.    Neurological: Negative.    Psychiatric/Behavioral: Negative.        Past Medical History:  He has a past medical history of Chronic sinusitis, unspecified (05/11/2017), Hypothyroidism, unspecified (10/12/2022), Other specified disorders of eye and adnexa (05/08/2019), Personal history of other diseases of the respiratory system (04/10/2015), and Personal history of other diseases of the respiratory system (06/14/2016).    Past Surgical History:  He has a past surgical history that includes Cardiac pacemaker placement (09/22/2014); Knee surgery (09/22/2014); Other surgical history (09/22/2014); and Other surgical history (10/06/2021).      Social History:  He reports that he has quit smoking. His smoking use included cigarettes. He has never used smokeless tobacco. He reports current alcohol use of  about 7.0 standard drinks of alcohol per week. He reports that he does not use drugs.    Family History:  No family history on file.     Allergies:  Patient has no known allergies.    Outpatient Medications:  Current Outpatient Medications   Medication Instructions    carvedilol (COREG) 25 mg, oral, 2 times daily (morning and late afternoon)    enoxaparin (LOVENOX) 30 mg, subcutaneous, 2 times daily    levothyroxine (Euthyrox) 75 mcg tablet TAKE ONE TABLET BY MOUTH once DAILY as directed **need lab work**    sacubitriL-valsartan (Entresto) 24-26 mg tablet 1 tablet, 2 times daily    warfarin (COUMADIN) 7.5 mg, oral, Nightly        Last Recorded Vitals:  Vitals:    12/10/24 1358   BP: 170/82   Pulse: 60   SpO2: 99%   Weight: 84.9 kg (187 lb 1.6 oz)       Physical Exam:  Physical Exam  Vitals reviewed.   HENT:      Head: Normocephalic.      Nose: Nose normal.   Eyes:      Pupils: Pupils are equal, round, and reactive to light.   Cardiovascular:      Rate and Rhythm: Normal rate and regular rhythm. Soft systolic murmur   Pulmonary:      Effort: Pulmonary effort is normal.      Breath sounds: Normal breath sounds.   Abdominal:      General: Abdomen is flat.      Palpations: Abdomen is soft.   Musculoskeletal:         General: Normal range of motion.      Cervical back: Normal range of motion.   Skin:     General: Skin is warm and dry.   Neurological:      General: No focal deficit present.      Mental Status: He is alert and oriented to person, place, and time.   Psychiatric:         Mood and Affect: Mood normal.            Last Labs:  CBC -  Lab Results   Component Value Date    WBC 7.8 09/25/2023    HGB 13.5 09/25/2023    HCT 42.2 09/25/2023    MCV 93 09/25/2023     (L) 09/25/2023       CMP -  Lab Results   Component Value Date    CALCIUM 8.8 09/25/2023    PROT 6.1 (L) 09/25/2023    ALBUMIN 3.8 09/25/2023    AST 16 09/25/2023    ALT 14 09/25/2023    ALKPHOS 48 09/25/2023    BILITOT 0.7 09/25/2023       LIPID  "PANEL -   Lab Results   Component Value Date    CHOL 193 09/25/2023    TRIG 184 (H) 09/25/2023    HDL 28.8 (A) 09/25/2023    CHHDL 6.7 (A) 09/25/2023    LDLF 127 (H) 09/25/2023    VLDL 37 09/25/2023    NHDL 185 09/23/2021       RENAL FUNCTION PANEL -   Lab Results   Component Value Date    GLUCOSE 100 (H) 09/25/2023     09/25/2023    K 4.5 09/25/2023     09/25/2023    CO2 28 09/25/2023    ANIONGAP 12 09/25/2023    BUN 27 (H) 09/25/2023    CREATININE 1.80 (H) 09/25/2023    GFRMALE 37 (A) 09/25/2023    CALCIUM 8.8 09/25/2023    ALBUMIN 3.8 09/25/2023        No results found for: \"BNP\", \"HGBA1C\"    Last Cardiology Tests:  ECG:    Echo:  Echocardiogram 6/2/2022  moderate left ventricle dysfunction estimated fraction 40%     normal RV function  pacemaker wire in the right ventricle  Normal  dimensions of the left ventricle and right ventricle  Moderately to severely dilated left atrium / moderately dilated right  atrium  Posterior mitral valve annular calcification  The proximal ascending aorta measured up to 3.7 centimeter  Trace to mild mitral regurgitation  tricuspid valve Normal structurally   bioprosthetic valve in the aortic position is seated well.  No aortic  regurgitation V max 1.1 m/sec,      calculated ESTHER 2.6 cm2,p/m gradient 5/2mmHg on 2 D images  excursions appears normal.  No pericardial effusion  LVEF 40%    Cath:    Stress Test:    Cardiac Imaging:      Assessment/Plan   Mr. Brush is a very pleasant 84 year old gentleman with a history of chronic atrial fibrillation (Coumadin), HFrEF (LVEF 40%), AVR, HTN and PPM, he is here to establish cardiac care. Denies chest pain or shortness of breath. Continues to stay active on a regular basis. Heart rate and blood pressure are well controlled at today's office visit    Plan   -call with any questions   -follow up in six months   -repeat echocardiogram in six months   -continue Coumadin, currently monitors at home   -continue Carvedilol 25 mg twice " a day and Entresto 24-26 mg twice a day     I appreciate the opportunity to participate in the patient's care, please call with any questions         Jennie Mendez, APRN-CNP

## 2024-12-10 NOTE — PATIENT INSTRUCTIONS
CALL WITH ANY QUESTIONS   NO MEDICATION CHANGES   FOLLOW UP IN SIX MONTHS   ECHOCARDIOGRAM AT NEXT OFFICE VISIT

## 2024-12-16 ENCOUNTER — TELEPHONE (OUTPATIENT)
Dept: CARDIOLOGY | Facility: CLINIC | Age: 84
End: 2024-12-16
Payer: MEDICARE

## 2024-12-16 DIAGNOSIS — I50.9 OTHER CONGESTIVE HEART FAILURE: Primary | ICD-10-CM

## 2024-12-18 RX ORDER — SACUBITRIL AND VALSARTAN 24; 26 MG/1; MG/1
1 TABLET, FILM COATED ORAL 2 TIMES DAILY
Qty: 60 TABLET | Refills: 5 | Status: SHIPPED | OUTPATIENT
Start: 2024-12-18

## 2024-12-19 ENCOUNTER — HOSPITAL ENCOUNTER (OUTPATIENT)
Dept: CARDIOLOGY | Facility: HOSPITAL | Age: 84
Discharge: HOME | End: 2024-12-19
Payer: MEDICARE

## 2024-12-19 ENCOUNTER — HOSPITAL ENCOUNTER (OUTPATIENT)
Dept: RADIOLOGY | Facility: HOSPITAL | Age: 84
Discharge: HOME | End: 2024-12-19
Payer: MEDICARE

## 2024-12-19 VITALS
SYSTOLIC BLOOD PRESSURE: 144 MMHG | OXYGEN SATURATION: 99 % | DIASTOLIC BLOOD PRESSURE: 80 MMHG | RESPIRATION RATE: 20 BRPM | HEART RATE: 65 BPM

## 2024-12-19 DIAGNOSIS — H53.2 DIPLOPIA: ICD-10-CM

## 2024-12-19 DIAGNOSIS — H57.12 OCULAR PAIN, LEFT EYE: ICD-10-CM

## 2024-12-19 DIAGNOSIS — H05.413: ICD-10-CM

## 2024-12-19 LAB
BODY SURFACE AREA: 1.96 M2
BODY SURFACE AREA: 1.96 M2

## 2024-12-19 PROCEDURE — 70553 MRI BRAIN STEM W/O & W/DYE: CPT

## 2024-12-19 PROCEDURE — 70553 MRI BRAIN STEM W/O & W/DYE: CPT | Performed by: RADIOLOGY

## 2024-12-19 PROCEDURE — 2550000001 HC RX 255 CONTRASTS: Performed by: OPHTHALMOLOGY

## 2024-12-19 PROCEDURE — 70543 MRI ORBT/FAC/NCK W/O &W/DYE: CPT | Performed by: RADIOLOGY

## 2024-12-19 PROCEDURE — A9575 INJ GADOTERATE MEGLUMI 0.1ML: HCPCS | Performed by: OPHTHALMOLOGY

## 2024-12-19 PROCEDURE — 93286 PERI-PX EVAL PM/LDLS PM IP: CPT

## 2024-12-19 PROCEDURE — 70543 MRI ORBT/FAC/NCK W/O &W/DYE: CPT

## 2024-12-19 RX ORDER — GADOTERATE MEGLUMINE 376.9 MG/ML
20 INJECTION INTRAVENOUS
Status: COMPLETED | OUTPATIENT
Start: 2024-12-19 | End: 2024-12-19

## 2024-12-19 NOTE — NURSING NOTE
0892 Arrival of device clinic nurse to interrogate patient's pacemaker for MRI scan.  Consent obtained by radiologist. ABBI

## 2024-12-20 ENCOUNTER — TELEPHONE (OUTPATIENT)
Dept: CARDIOLOGY | Facility: CLINIC | Age: 84
End: 2024-12-20
Payer: MEDICARE

## 2024-12-20 NOTE — TELEPHONE ENCOUNTER
Patient called regarding his entresto but it was already sent over to the pharmacy with a new prescription written

## 2024-12-23 ENCOUNTER — TELEPHONE (OUTPATIENT)
Dept: CARDIOLOGY | Facility: CLINIC | Age: 84
End: 2024-12-23
Payer: MEDICARE

## 2025-01-07 ENCOUNTER — HOSPITAL ENCOUNTER (OUTPATIENT)
Dept: CARDIOLOGY | Facility: CLINIC | Age: 85
Discharge: HOME | End: 2025-01-07
Payer: MEDICARE

## 2025-01-07 DIAGNOSIS — Z95.0 PRESENCE OF CARDIAC PACEMAKER: ICD-10-CM

## 2025-01-07 DIAGNOSIS — I48.0 PAROXYSMAL ATRIAL FIBRILLATION (MULTI): ICD-10-CM

## 2025-01-07 DIAGNOSIS — I49.5 TACHYCARDIA-BRADYCARDIA (MULTI): ICD-10-CM

## 2025-01-08 ENCOUNTER — TELEPHONE (OUTPATIENT)
Dept: PRIMARY CARE | Facility: CLINIC | Age: 85
End: 2025-01-08
Payer: MEDICARE

## 2025-01-13 ENCOUNTER — APPOINTMENT (OUTPATIENT)
Dept: PRIMARY CARE | Facility: CLINIC | Age: 85
End: 2025-01-13
Payer: MEDICARE

## 2025-01-13 VITALS
OXYGEN SATURATION: 99 % | WEIGHT: 186 LBS | BODY MASS INDEX: 29.13 KG/M2 | SYSTOLIC BLOOD PRESSURE: 126 MMHG | HEART RATE: 70 BPM | TEMPERATURE: 97.9 F | DIASTOLIC BLOOD PRESSURE: 70 MMHG

## 2025-01-13 DIAGNOSIS — C85.90 LYMPHOMA, UNSPECIFIED BODY REGION, UNSPECIFIED LYMPHOMA TYPE (MULTI): ICD-10-CM

## 2025-01-13 DIAGNOSIS — C44.91 BASAL CELL CARCINOMA (BCC), UNSPECIFIED SITE: ICD-10-CM

## 2025-01-13 DIAGNOSIS — H57.12 PAIN OF LEFT EYE: Primary | ICD-10-CM

## 2025-01-13 DIAGNOSIS — H05.412 ENOPHTHALMOS DUE TO ATROPHY OF LEFT ORBITAL TISSUE: ICD-10-CM

## 2025-01-13 DIAGNOSIS — D64.9 ANEMIA, UNSPECIFIED TYPE: Primary | ICD-10-CM

## 2025-01-13 PROCEDURE — 1123F ACP DISCUSS/DSCN MKR DOCD: CPT | Performed by: FAMILY MEDICINE

## 2025-01-13 PROCEDURE — 99213 OFFICE O/P EST LOW 20 MIN: CPT | Performed by: FAMILY MEDICINE

## 2025-01-13 PROCEDURE — 1159F MED LIST DOCD IN RCRD: CPT | Performed by: FAMILY MEDICINE

## 2025-01-13 PROCEDURE — 3078F DIAST BP <80 MM HG: CPT | Performed by: FAMILY MEDICINE

## 2025-01-13 PROCEDURE — 3074F SYST BP LT 130 MM HG: CPT | Performed by: FAMILY MEDICINE

## 2025-01-13 RX ORDER — ERYTHROMYCIN 5 MG/G
OINTMENT OPHTHALMIC EVERY 6 HOURS
Qty: 3.5 G | Refills: 1 | Status: SHIPPED | OUTPATIENT
Start: 2025-01-13

## 2025-01-13 RX ORDER — ERYTHROMYCIN 5 MG/G
OINTMENT OPHTHALMIC
COMMUNITY
Start: 2024-11-05 | End: 2025-01-13 | Stop reason: SDUPTHER

## 2025-01-13 RX ORDER — ERYTHROMYCIN 5 MG/G
OINTMENT OPHTHALMIC EVERY 6 HOURS
Qty: 3.5 G | Refills: 1 | Status: SHIPPED | OUTPATIENT
Start: 2025-01-13 | End: 2025-01-13

## 2025-01-13 NOTE — PROGRESS NOTES
AT the request of Dr. Ruth Ann Chris, an URGENT referral to [facility] has been placed for his orbital mass.

## 2025-01-14 ENCOUNTER — HOSPITAL ENCOUNTER (OUTPATIENT)
Dept: RADIOLOGY | Facility: HOSPITAL | Age: 85
Discharge: HOME | End: 2025-01-14
Payer: MEDICARE

## 2025-01-14 DIAGNOSIS — C85.90 LYMPHOMA, UNSPECIFIED BODY REGION, UNSPECIFIED LYMPHOMA TYPE (MULTI): ICD-10-CM

## 2025-01-14 PROBLEM — H57.12 PAIN OF LEFT EYE: Status: ACTIVE | Noted: 2025-01-14

## 2025-01-14 PROCEDURE — A9552 F18 FDG: HCPCS | Performed by: INTERNAL MEDICINE

## 2025-01-14 PROCEDURE — 78815 PET IMAGE W/CT SKULL-THIGH: CPT | Mod: PET TUMOR INIT TX STRAT | Performed by: RADIOLOGY

## 2025-01-14 PROCEDURE — 3430000001 HC RX 343 DIAGNOSTIC RADIOPHARMACEUTICALS: Performed by: INTERNAL MEDICINE

## 2025-01-14 PROCEDURE — 78815 PET IMAGE W/CT SKULL-THIGH: CPT | Mod: PI

## 2025-01-14 RX ORDER — FLUDEOXYGLUCOSE F 18 200 MCI/ML
14.3 INJECTION, SOLUTION INTRAVENOUS
Status: COMPLETED | OUTPATIENT
Start: 2025-01-14 | End: 2025-01-14

## 2025-01-14 RX ADMIN — FLUDEOXYGLUCOSE F 18 14.3 MILLICURIE: 200 INJECTION, SOLUTION INTRAVENOUS at 12:45

## 2025-01-14 NOTE — PROGRESS NOTES
"Subjective   Patient ID: Donavon Brush is a 84 y.o. male who presents for Eye Problem (Medication refill due to eye \"falling in\").    Eye Problem      SAW DR JACQUELINE ZAMBRANO, OPHTHALMOLOGY CONCERNING HIS EYE (POTENTIALLY INFECTED AND RETRACTED INTO HIS SOCKET)  CT REVEALS ABNORMALITIES THAT MAY BE MALIGNANT AND HE NEEDS A PT/CT   PATIENT HER TO DISCUSS IF THAT NEEDS DONE   [facility] ALREADY HAS HIM SCHEDULED WITH DR MONTILLA, ONCOLOGY   PATIENT IS RELUCTANT   REVIEWED THE DRS NOTES AND CT WITH HIM AND ADVISED FOLLOWING THE PLAN     Review of SystemsSINCE THE  VISIT NO INTERVAL HISTORICAL CHANGES IN ANY OF THE 12 SYSTEMS REVIEWED OTHER THAN WORSENING  ISSUES WITH THE LEFT EYE     Objective   /70   Pulse 70   Temp 36.6 °C (97.9 °F)   Wt 84.4 kg (186 lb)   SpO2 99%   BMI 29.13 kg/m²     Physical ExamSINCE RECENT VISIT NO INTERVAL PHYSICAL CHANGES IN ANY OF THE 12 SYSTEMS REVIEWED OTHER THAN WORSENING VISION AND HE THINKS INFECTION (NONE SEEN)    Assessment/Plan   Problem List Items Addressed This Visit             ICD-10-CM    Pain of left eye - Primary H57.12    Relevant Medications    erythromycin (Romycin) 5 mg/gram (0.5 %) ophthalmic ointment     Other Visit Diagnoses         Codes    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

## 2025-01-16 ENCOUNTER — LAB (OUTPATIENT)
Dept: LAB | Facility: CLINIC | Age: 85
End: 2025-01-16
Payer: MEDICARE

## 2025-01-16 ENCOUNTER — OFFICE VISIT (OUTPATIENT)
Dept: HEMATOLOGY/ONCOLOGY | Facility: CLINIC | Age: 85
End: 2025-01-16
Payer: MEDICARE

## 2025-01-16 VITALS
BODY MASS INDEX: 29.04 KG/M2 | RESPIRATION RATE: 18 BRPM | HEART RATE: 62 BPM | DIASTOLIC BLOOD PRESSURE: 73 MMHG | TEMPERATURE: 97.5 F | WEIGHT: 185.41 LBS | OXYGEN SATURATION: 99 % | SYSTOLIC BLOOD PRESSURE: 158 MMHG

## 2025-01-16 DIAGNOSIS — R68.89 ABNORMAL FINDING: ICD-10-CM

## 2025-01-16 DIAGNOSIS — D64.9 ANEMIA, UNSPECIFIED TYPE: ICD-10-CM

## 2025-01-16 LAB
ALBUMIN SERPL BCP-MCNC: 3.9 G/DL (ref 3.4–5)
ALP SERPL-CCNC: 53 U/L (ref 33–136)
ALT SERPL W P-5'-P-CCNC: 9 U/L (ref 10–52)
ANION GAP SERPL CALC-SCNC: 11 MMOL/L (ref 10–20)
AST SERPL W P-5'-P-CCNC: 13 U/L (ref 9–39)
BASOPHILS # BLD AUTO: 0.06 X10*3/UL (ref 0–0.1)
BASOPHILS NFR BLD AUTO: 0.6 %
BILIRUB SERPL-MCNC: 0.8 MG/DL (ref 0–1.2)
BUN SERPL-MCNC: 27 MG/DL (ref 6–23)
CALCIUM SERPL-MCNC: 9.5 MG/DL (ref 8.6–10.3)
CHLORIDE SERPL-SCNC: 105 MMOL/L (ref 98–107)
CO2 SERPL-SCNC: 30 MMOL/L (ref 21–32)
CREAT SERPL-MCNC: 1.4 MG/DL (ref 0.5–1.3)
EGFRCR SERPLBLD CKD-EPI 2021: 50 ML/MIN/1.73M*2
EOSINOPHIL # BLD AUTO: 0.33 X10*3/UL (ref 0–0.4)
EOSINOPHIL NFR BLD AUTO: 3.4 %
ERYTHROCYTE [DISTWIDTH] IN BLOOD BY AUTOMATED COUNT: 14.1 % (ref 11.5–14.5)
FERRITIN SERPL-MCNC: 219 NG/ML (ref 20–300)
FOLATE SERPL-MCNC: 15.3 NG/ML
GLUCOSE SERPL-MCNC: 83 MG/DL (ref 74–99)
HCT VFR BLD AUTO: 43.4 % (ref 41–52)
HGB BLD-MCNC: 13.9 G/DL (ref 13.5–17.5)
IMM GRANULOCYTES # BLD AUTO: 0.03 X10*3/UL (ref 0–0.5)
IMM GRANULOCYTES NFR BLD AUTO: 0.3 % (ref 0–0.9)
IRON SATN MFR SERPL: 25 % (ref 25–45)
IRON SERPL-MCNC: 67 UG/DL (ref 35–150)
LDH SERPL L TO P-CCNC: 162 U/L (ref 84–246)
LYMPHOCYTES # BLD AUTO: 5.02 X10*3/UL (ref 0.8–3)
LYMPHOCYTES NFR BLD AUTO: 51.6 %
MCH RBC QN AUTO: 28.7 PG (ref 26–34)
MCHC RBC AUTO-ENTMCNC: 32 G/DL (ref 32–36)
MCV RBC AUTO: 90 FL (ref 80–100)
MONOCYTES # BLD AUTO: 0.77 X10*3/UL (ref 0.05–0.8)
MONOCYTES NFR BLD AUTO: 7.9 %
NEUTROPHILS # BLD AUTO: 3.51 X10*3/UL (ref 1.6–5.5)
NEUTROPHILS NFR BLD AUTO: 36.2 %
NRBC BLD-RTO: 0 /100 WBCS (ref 0–0)
PLATELET # BLD AUTO: 145 X10*3/UL (ref 150–450)
POTASSIUM SERPL-SCNC: 4.6 MMOL/L (ref 3.5–5.3)
PROT SERPL-MCNC: 6.6 G/DL (ref 6.4–8.2)
RBC # BLD AUTO: 4.85 X10*6/UL (ref 4.5–5.9)
SODIUM SERPL-SCNC: 141 MMOL/L (ref 136–145)
TIBC SERPL-MCNC: 269 UG/DL (ref 240–445)
UIBC SERPL-MCNC: 202 UG/DL (ref 110–370)
URATE SERPL-MCNC: 8.2 MG/DL (ref 4–7.5)
VIT B12 SERPL-MCNC: 539 PG/ML (ref 211–911)
WBC # BLD AUTO: 9.7 X10*3/UL (ref 4.4–11.3)

## 2025-01-16 PROCEDURE — 99205 OFFICE O/P NEW HI 60 MIN: CPT | Performed by: INTERNAL MEDICINE

## 2025-01-16 PROCEDURE — 3078F DIAST BP <80 MM HG: CPT | Performed by: INTERNAL MEDICINE

## 2025-01-16 PROCEDURE — 1126F AMNT PAIN NOTED NONE PRSNT: CPT | Performed by: INTERNAL MEDICINE

## 2025-01-16 PROCEDURE — 36415 COLL VENOUS BLD VENIPUNCTURE: CPT

## 2025-01-16 PROCEDURE — 1123F ACP DISCUSS/DSCN MKR DOCD: CPT | Performed by: INTERNAL MEDICINE

## 2025-01-16 PROCEDURE — 82728 ASSAY OF FERRITIN: CPT

## 2025-01-16 PROCEDURE — 99215 OFFICE O/P EST HI 40 MIN: CPT | Mod: 25 | Performed by: INTERNAL MEDICINE

## 2025-01-16 PROCEDURE — 85025 COMPLETE CBC W/AUTO DIFF WBC: CPT

## 2025-01-16 PROCEDURE — 1159F MED LIST DOCD IN RCRD: CPT | Performed by: INTERNAL MEDICINE

## 2025-01-16 PROCEDURE — 1160F RVW MEDS BY RX/DR IN RCRD: CPT | Performed by: INTERNAL MEDICINE

## 2025-01-16 PROCEDURE — 83615 LACTATE (LD) (LDH) ENZYME: CPT

## 2025-01-16 PROCEDURE — 82607 VITAMIN B-12: CPT

## 2025-01-16 PROCEDURE — 83540 ASSAY OF IRON: CPT

## 2025-01-16 PROCEDURE — 84550 ASSAY OF BLOOD/URIC ACID: CPT

## 2025-01-16 PROCEDURE — 82746 ASSAY OF FOLIC ACID SERUM: CPT

## 2025-01-16 PROCEDURE — 3077F SYST BP >= 140 MM HG: CPT | Performed by: INTERNAL MEDICINE

## 2025-01-16 PROCEDURE — 84075 ASSAY ALKALINE PHOSPHATASE: CPT

## 2025-01-16 ASSESSMENT — PAIN SCALES - GENERAL: PAINLEVEL_OUTOF10: 0-NO PAIN

## 2025-01-16 ASSESSMENT — COLUMBIA-SUICIDE SEVERITY RATING SCALE - C-SSRS
6. HAVE YOU EVER DONE ANYTHING, STARTED TO DO ANYTHING, OR PREPARED TO DO ANYTHING TO END YOUR LIFE?: NO
2. HAVE YOU ACTUALLY HAD ANY THOUGHTS OF KILLING YOURSELF?: NO
1. IN THE PAST MONTH, HAVE YOU WISHED YOU WERE DEAD OR WISHED YOU COULD GO TO SLEEP AND NOT WAKE UP?: NO

## 2025-01-16 NOTE — TELEPHONE ENCOUNTER
Pt states he needs a refill on the antibiotic for an eye infection.  He needs seen please call.  Last script sent over in 8/24.   Detail Level: Detailed Quality 226: Preventive Care And Screening: Tobacco Use: Screening And Cessation Intervention: Patient screened for tobacco use and is an ex/non-smoker

## 2025-01-16 NOTE — PROGRESS NOTES
Pt seen in office today for a new patient  visit with Dr. Ruth Ann Chris for management of his abnormal MRI of the orbit/ brain.  HE is without complaints today and denies pain.     Medications, pharmacy preference and allergies were reviewed with patient and updated in the medical record by MD.     He had a PET on 1/14/25 and has been referred to see [facility] ophthalmology, Dr. Ike Bradley on 1/17/25. He had labs obtained today prior to his visit.He is to have a STAT referral to Dr. Addy Santana in general surgery for a surgical excisional left supraclavicular ln biopsy. He will RTC In 4 weeks to review findings and formulate a treatment plan.    Our contact information was given to patient and they were encouraged to contact us with any questions or concerns.     Patient verbalized understanding and agreement regarding discussed information via verbal feedback. Pt escorted to scheduling.

## 2025-01-16 NOTE — PROGRESS NOTES
Patient ID: Donavon Brush is a 84 y.o. male.  Referring Physician: No referring provider defined for this encounter.  Primary Care Provider: Josie Payne DO  Referral Reason: Orbital mass    Subjective:  Sunken eye    Heme/Onc History:    - MRI Orbit 12/19/24 IMPRESSION: Diffuse loss of left orbital intraconal fat with associated enhancement and significant enophthalmos of the left globe. Enhancement extends into the left superior orbital fissure and optic canal. Differential includes metastatic disease versus an inflammatory process such as Shannon Hunt syndrome, sarcoidosis, or orbital pseudotumor.    PET CT 01/14/25: IMPRESSION: 1. Conglomerated FDG avid left infraclavicular, mediastinal, bilateral internal iliac (right> left) and retroperitoneal lymph nodes, consistent with lymphomatous involvement. 2. FDG avid soft tissue mass posterolateral to left kidney, consistent with lymphomatous involvement. 3. Multiple FDG avid bilateral pulmonary nodules, concerning for extranodal disease. 4. FDG uptake within right supraclavicular region, likely  postsurgical. 5. Nonspecific FDG uptake within the aortic root, likely postprocedural. 6. Brain lesion seen on MRI brain dated 12/19/2024 not well appreciated on PET. Correlation with MRI brain is recommended.      Past Medical History:   Past Medical History:   Diagnosis Date    Chronic sinusitis, unspecified 05/11/2017    Sinobronchitis    Hypothyroidism, unspecified 10/12/2022    Adult hypothyroidism    Other specified disorders of eye and adnexa 05/08/2019    Irritation of right eye    Personal history of other diseases of the respiratory system 04/10/2015    History of acute bronchitis    Personal history of other diseases of the respiratory system 06/14/2016    History of acute sinusitis     Social History:   Social History     Socioeconomic History    Marital status:      Spouse name: Not on file    Number of children: Not on file    Years of education: Not  on file    Highest education level: Not on file   Occupational History    Not on file   Tobacco Use    Smoking status: Former     Types: Cigarettes    Smokeless tobacco: Never   Vaping Use    Vaping status: Never Used   Substance and Sexual Activity    Alcohol use: Yes     Alcohol/week: 7.0 standard drinks of alcohol     Types: 7 Cans of beer per week    Drug use: Never    Sexual activity: Not on file   Other Topics Concern    Not on file   Social History Narrative    Not on file     Social Drivers of Health     Financial Resource Strain: Not on file   Food Insecurity: No Food Insecurity (9/4/2024)    Received from [facility]    Hunger Vital Sign     Worried About Running Out of Food in the Last Year: Never true     Ran Out of Food in the Last Year: Never true   Transportation Needs: No Transportation Needs (9/4/2024)    Received from [facility]    PRAPARE - Transportation     Lack of Transportation (Medical): No     Lack of Transportation (Non-Medical): No   Physical Activity: Not on file   Stress: Not on file   Social Connections: Not on file   Intimate Partner Violence: Not on file   Housing Stability: Unknown (9/4/2024)    Received from [facility]    Housing Stability Vital Sign     Unable to Pay for Housing in the Last Year: No     Number of Times Moved in the Last Year: Not on file     Homeless in the Last Year: No     Surgical History:   Past Surgical History:   Procedure Laterality Date    CARDIAC PACEMAKER PLACEMENT  09/22/2014    Pacemaker Placement    KNEE SURGERY  09/22/2014    Knee Surgery    OTHER SURGICAL HISTORY  09/22/2014    Heart Valve Replacement    OTHER SURGICAL HISTORY  10/06/2021    Cataract surgery    PROSTATE SURGERY       Family History:   Family History   Problem Relation Name Age of Onset    Alzheimer's disease Mother          passed at 84    No Known Problems Father          passed at 66    Heart disease Brother Enrrique     No Known Problems Daughter      No Known  Problems Son      No Known Problems Son        reports that he has quit smoking. His smoking use included cigarettes. He has never used smokeless tobacco.  Oncology Family history: Cancer-related family history is not on file.    Review Of Systems:  As stated per in HPI; otherwise all other 12 point ROS are negative    Physical Exam:  /73 (BP Location: Right arm, Patient Position: Sitting, BP Cuff Size: Adult long)   Pulse 62   Temp 36.4 °C (97.5 °F) (Temporal)   Resp 18   Wt 84.1 kg (185 lb 6.5 oz) Comment: pt refused to take shoes off  SpO2 99%   BMI 29.04 kg/m²   BSA: 1.99 meters squared  General: awake/alert/oriented x3, no distress, alert and cooperative  Head: Short hair fully covering scalp. Symmetric facial expressions  Eyes: PERRL, EOMI, clear sclera, eyebrows present.  Ears/Nose/Mouth/Throat:  Oral mucous membranes moist. No oral ulcers. No palpable pre/post-auricular lymph nodes  Neck: No palpable cervical chain lymph nodes  Respiratory: unlabored breathing on room air, good chest expansion, thorax symmetric  Cardio: Regular rate and rhythm, normal S1 and S2, radial pulses symmetric  GI: Nondistended, soft, non-tender abdomen  Musculoskeletal: Normal muscle bulk and tone, ROM intact, no joint swelling.  Rises from chair and walks unassisted.  Extremities: No ankle swelling, no arm or leg wounds  Neuro: Alert, cognition intact, speech normal. Facial expressions symmetric.  No motor deficits noted. Sensation intact to touch and hot/cold.   Able to stand from seated position unassisted and walks around the room unassisted.  Psychological: Appropriate mood and behavior.  Skin: Warm and dry, no lesions, no rashes    Results:  Diagnostic Results   Lab Results   Component Value Date    WBC 9.7 01/16/2025    HGB 13.9 01/16/2025    HCT 43.4 01/16/2025    MCV 90 01/16/2025     (L) 01/16/2025     Lab Results   Component Value Date    CALCIUM 9.5 01/16/2025     01/16/2025    K 4.6 01/16/2025     CO2 30 01/16/2025     01/16/2025    BUN 27 (H) 01/16/2025    CREATININE 1.40 (H) 01/16/2025    ALT 9 (L) 01/16/2025    AST 13 01/16/2025       Current Outpatient Medications:     carvedilol (Coreg) 25 mg tablet, Take 1 tablet (25 mg) by mouth 2 times daily (morning and late afternoon)., Disp: 180 tablet, Rfl: 3    enoxaparin (Lovenox) 30 mg/0.3 mL syringe, Inject 0.3 mL (30 mg) under the skin 2 times a day. (Patient not taking: Reported on 1/13/2025), Disp: 15 each, Rfl: 0    erythromycin (Romycin) 5 mg/gram (0.5 %) ophthalmic ointment, Apply to left eye every 6 hours. APPLY 1/2 INCH RIBBON IN THE LEFT EYE UP TO THREE TIMES DAILY, Disp: 3.5 g, Rfl: 1    levothyroxine (Euthyrox) 75 mcg tablet, TAKE ONE TABLET BY MOUTH once DAILY as directed **need lab work**, Disp: 90 tablet, Rfl: 3    sacubitriL-valsartan (Entresto) 24-26 mg tablet, Take 1 tablet by mouth 2 times a day., Disp: 60 tablet, Rfl: 5    warfarin (Coumadin) 7.5 mg tablet, Take 1 tablet (7.5 mg) by mouth once daily at bedtime., Disp: 90 tablet, Rfl: 3     Radiology:    Pathology:    Assessment/Plan:  ? Sunken eye:    PET CT is concerning for lymphoma. There is no orbital mass but loss of fat and enhancement.    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    Recommend left supraclavicular LN excisional biopsy and ocular biopsy.    He has an apt with Optho-Oncology tomorrow    Rtc in 4 weeks to discuss bx results and tx plan    There are no diagnoses linked to this encounter.     Performance Status: Symptomatic; fully ambulatory    I spent more than 60 minutes for the patient today, including face-to-face conversation, pre-visit preparation, post-visit orders, and others.   Ruth Ann Chris MD

## 2025-01-17 ENCOUNTER — OFFICE VISIT (OUTPATIENT)
Dept: OPHTHALMOLOGY | Facility: CLINIC | Age: 85
End: 2025-01-17
Payer: MEDICARE

## 2025-01-17 DIAGNOSIS — C44.91 BASAL CELL CARCINOMA (BCC), UNSPECIFIED SITE: ICD-10-CM

## 2025-01-17 DIAGNOSIS — H05.89 ORBITAL MASS: Primary | ICD-10-CM

## 2025-01-17 DIAGNOSIS — H47.10 OPTIC NERVE EDEMA: ICD-10-CM

## 2025-01-17 DIAGNOSIS — D31.60 BENIGN NEOPLASM OF ORBIT, UNSPECIFIED LATERALITY: ICD-10-CM

## 2025-01-17 DIAGNOSIS — H05.422 ENOPHTHALMOS OF LEFT EYE DUE TO TRAUMA OR SURGERY: ICD-10-CM

## 2025-01-17 ASSESSMENT — TONOMETRY
OD_IOP_MMHG: 14
OS_IOP_MMHG: 10
IOP_METHOD: GOLDMANN APPLANATION

## 2025-01-17 ASSESSMENT — VISUAL ACUITY
METHOD: SNELLEN - LINEAR
OD_CC: 20/20
OS_CC: 20/30
OD_CC+: -2
CORRECTION_TYPE: GLASSES

## 2025-01-22 NOTE — PROGRESS NOTES
84yoM referred for LEFT enophthalmos and concern for orbital lesion. Pt here today with spouse.  Pt referred by Dr. Chris from oncology for consideration of biopsy of LEFT orbital lesion. Concern for metastatic disease of unknown primary site with primary purpose of biopsy being diagnostic.  Pt reports that his only symptom is occasional pain in the left eye which has been treated intermittent with erythromycin lena BID.   He denies any vision changes, vision loss, double vision, or pain with eye movement.  Pt has considerable enophthalmos of the left eye clinically, but he does not seem to be aware of this or when it started. He reports that the intermittent pain he is describing began sometime after his cataract surgery in 2021.    Pertinent Exam and Testing Findings  Significant enophthalmos OS ([place] 98 mm; 15 mm OD; 7 mm OS)  VA 20/20 OD, 20/30 OS; PERRL without APD  EOM slightly reduced OD (50% adduction, otherwise full)  OCT Optic Nerve  No evidence of compressive optic atrophy; RNFL full OU  MRI TOMASA WWO (12/19/24) and CT O WO (9/28/24)  On personal review of imaging, there is an enhancing nondiscrete lesion in the inferolateral left orbit at the level of the equator, extending posteriorly and encasing the optic nerve. Considerable diffuse loss of left orbital fat (intraconal > extraconal) and enophthalmos.    Assessment/Plan  Left orbital lesion/neoplasm with enophthalmos; nondiscrete and encasing the optic nerve  Sclerosing enophthalmos is classically associated with breast cancer, though prior workup and history not compatible with this.   I spoke over the phone with Dr. Chris the goal is to get a biopsy of this lesion and of the lymphadenopathy noted on the recent PET/CT before systemic therapy/radiotherapy may be considered.   Pt will be undergoing LN biopsy with either general surgery or interventional radiology  I have been asked to do a biopsy of the left orbital lesion, which will likely  be most easily accessible via inferior orbitotomy. I explained to the patient and his spouse, and to Dr. Chris, that complete excision is not possible and that we should aim for subtotal excision primarily for diagnostic purposes. While there is no evidence of optic neuropathy and the vision and globe are in good shape presently, an orbitotomy and biopsy presents significant risks of infection, bleeding, loss of vision (possibly complete loss), double vision, cosmetic deformity, and need for additional surgery. All parties understand and would like to proceed with organizing the biopsy.  Of note, my current block time is only at [facility] and the earliest date I have available is 2/17/25. The patient is likely not a great candidate for performing this at a surgical center as he is on coumadin and has a pacemaker. Will need to do the biopsy at [facility] and will require PAT and pre-op assessments for his medical status and suitable for safe surgery. I am actively looking into this with our staff to coordinate biopsy as soon as possible at [facility]. I updated Dr. Chris on all of these matters. Pt will be contacted with further instructions.     Ike Bradley MD

## 2025-01-24 ENCOUNTER — OFFICE VISIT (OUTPATIENT)
Dept: SURGERY | Facility: CLINIC | Age: 85
End: 2025-01-24
Payer: MEDICARE

## 2025-01-24 DIAGNOSIS — R68.89 ABNORMAL FINDING: Primary | ICD-10-CM

## 2025-01-24 PROCEDURE — 1123F ACP DISCUSS/DSCN MKR DOCD: CPT | Performed by: SURGERY

## 2025-01-24 PROCEDURE — 99204 OFFICE O/P NEW MOD 45 MIN: CPT | Performed by: SURGERY

## 2025-01-24 ASSESSMENT — CUP TO DISC RATIO
OS_RATIO: 0.4
OD_RATIO: 0.4

## 2025-01-24 ASSESSMENT — EXTERNAL EXAM - RIGHT EYE: OD_EXAM: NORMAL

## 2025-01-24 NOTE — PROGRESS NOTES
General Surgery History and Physical    Referring Provider:  DO Dot Villasenor Soley, MD     Chief Complaint:  Referral for lymph node biopsy    History of Present Illness:  This is a 84 y.o. male who presents with a sunken left eye.  He reports that for 2 years he has been dealing with a slowly sinking left eye.  He has seen an ophthalmologist, who referred him to a specializing ophthalmologist.  This also included referral to an oncologist.  His oncologist ordered a PET scan, the PET scan returned showing intrathoracic and retroperitoneal potential lymphadenopathy that are PET avid.  He was referred to me for potential biopsy to rule out lymphoma.  He denies ever noticing any bulges in his neck, axilla, or groins.    Past Medical History:  Heart disease  Hypothyroidism    Past Surgical History:  Cardiac pacemaker placement  Knee surgery  Heart valve replacement  Cataract replacement  Prostate biopsy    Medications:  Carvedilol  Warfarin  Entresto  Levothyroxine    Allergies:  No known drug allergies    Family History:  Alzheimer's disease  Heart disease    Social History:  .  Retired.  Quit smoking 24 years ago.  Denies alcohol and illicits.       Review of Systems:  A complete 12 point review of systems was performed and is negative except as noted in the history of present illness.      Vital Signs:  There were no vitals filed for this visit.       Physical Exam:  General: No acute distress. Sitting up in bed.   Neuro: Alert and oriented ×3. Follows commands.  Head: Atraumatic  Eyes: Pupils equal reactive to light. Extraocular motions intact.  Ears: Hears normal speaking voice.  Mouth, Nose, Throat: Mucous membranes moist.  Normal dentition.  Neck: Supple. No appreciable masses.  Chest: No crepitus.  No appreciable scars.  Heart: Regular rate and rhythm.  Lung: Clear to auscultation bilaterally.  Vascular: No carotid bruits.  Palpable radial pulses bilaterally.  Abdomen: Soft.  Nondistended. Nontender.    Musculoskeletal: Moves all extremities.  Normal range of motion.  Lymphatic: No palpable lymph nodes in the cervical region, bilateral axilla, or bilateral inguinal regions  Skin: No rashes or lesions.  Psychological: Normal affect      Laboratory Values:  CBC:   Lab Results   Component Value Date    WBC 9.7 01/16/2025    RBC 4.85 01/16/2025    HGB 13.9 01/16/2025    HCT 43.4 01/16/2025     (L) 01/16/2025       RFP:   Lab Results   Component Value Date     01/16/2025    K 4.6 01/16/2025     01/16/2025    CO2 30 01/16/2025    BUN 27 (H) 01/16/2025    CREATININE 1.40 (H) 01/16/2025    CALCIUM 9.5 01/16/2025        LFTs:   Lab Results   Component Value Date    PROT 6.6 01/16/2025    ALBUMIN 3.9 01/16/2025    BILITOT 0.8 01/16/2025    ALKPHOS 53 01/16/2025    AST 13 01/16/2025    ALT 9 (L) 01/16/2025            Imaging:  I have personally reviewed the images and the radiologist's report.  PET: Potential PET avid lesions intrathoracic and in the retroperitoneum      Assessment:  This is a 84 y.o. male who presents with concerns for lymphoma based on the PET scan.  He was referred for excisional lymph node biopsy.  We discussed that unfortunately there are no palpable superficial lymph nodes, and certainly none that are associated with the PET scan.  We discussed that the PET avid regions are intrathoracic and retroperitoneal.  We discussed that because of this, in this case, I would recommend interventional radiology for biopsy.  We did address that in general excisional lymph node biopsy is better for lymphoma, but in his case the areas of concern are deep and near major structures, and a surgical excision would be unnecessarily dangerous at this time      Plan:  -- Referral to interventional radiology      Ayaz Alejo MD  General Surgery  Office: [phone]  Fax:     [phone]  11:20 AM   01/24/25

## 2025-01-27 ENCOUNTER — HOSPITAL ENCOUNTER (OUTPATIENT)
Facility: HOSPITAL | Age: 85
Setting detail: SURGERY ADMIT
End: 2025-01-27
Payer: MEDICARE

## 2025-01-27 DIAGNOSIS — C85.91 LYMPHOMA OF LYMPH NODES OF NECK, UNSPECIFIED LYMPHOMA TYPE (MULTI): ICD-10-CM

## 2025-01-27 DIAGNOSIS — R68.89 ABNORMAL FINDING: Primary | ICD-10-CM

## 2025-01-27 PROBLEM — H05.89 ORBITAL MASS: Status: ACTIVE | Noted: 2025-01-17

## 2025-01-29 ENCOUNTER — OFFICE VISIT (OUTPATIENT)
Dept: PRIMARY CARE | Facility: CLINIC | Age: 85
End: 2025-01-29
Payer: MEDICARE

## 2025-01-29 VITALS
TEMPERATURE: 97.6 F | HEART RATE: 65 BPM | SYSTOLIC BLOOD PRESSURE: 124 MMHG | DIASTOLIC BLOOD PRESSURE: 60 MMHG | OXYGEN SATURATION: 97 % | WEIGHT: 187 LBS | BODY MASS INDEX: 29.29 KG/M2

## 2025-01-29 DIAGNOSIS — H05.412 ENOPHTHALMOS DUE TO ATROPHY OF LEFT ORBITAL TISSUE: Primary | ICD-10-CM

## 2025-01-29 DIAGNOSIS — I48.0 PAROXYSMAL ATRIAL FIBRILLATION (MULTI): ICD-10-CM

## 2025-01-29 PROCEDURE — 1159F MED LIST DOCD IN RCRD: CPT | Performed by: FAMILY MEDICINE

## 2025-01-29 PROCEDURE — 1123F ACP DISCUSS/DSCN MKR DOCD: CPT | Performed by: FAMILY MEDICINE

## 2025-01-29 PROCEDURE — 3074F SYST BP LT 130 MM HG: CPT | Performed by: FAMILY MEDICINE

## 2025-01-29 PROCEDURE — 3078F DIAST BP <80 MM HG: CPT | Performed by: FAMILY MEDICINE

## 2025-01-29 PROCEDURE — 99214 OFFICE O/P EST MOD 30 MIN: CPT | Performed by: FAMILY MEDICINE

## 2025-01-29 RX ORDER — ENOXAPARIN SODIUM 100 MG/ML
30 INJECTION SUBCUTANEOUS 2 TIMES DAILY
Qty: 15 EACH | Refills: 0 | Status: SHIPPED | OUTPATIENT
Start: 2025-01-29

## 2025-01-29 NOTE — PATIENT INSTRUCTIONS
Discussed at length the bridging with the lovenox /PREVENT BLOOD CLOTS     TAKE THE LOVENOX NOW IF YOU ARE OFF THE COUMADIN

## 2025-01-29 NOTE — PROGRESS NOTES
"Subjective   Patient ID: Donavon Brush is a 84 y.o. male who presents for Surgical Clearance ( Freddy Bruce/ [facility]. Was supposed to have eye surgery today and it was canceled due to not stopping his coumadin.  He would like to discuss his coumadin with you./No papers given to fill out either.).    HPI was taking the coumadin and the surgery was cx'd   HE IS ANTICIPATING FURTHER SURGERY, THEY MENTION HIS EYE AND HIS SHOULDER AND HIS KIDNEY  HE IS ON NO BLOOD THINNER AT THIS TIME AND HE HAS A FIB   HE NEEDS BRIDGING BETWEEN ALL THESE PROCEDURES AND OR SURGERIES   THE OPHTHALMOLOGIST INDICATED \"ONLY NEEDED 5 DAYS\"    Review of SystemsSINCE THE  VISIT NO INTERVAL HISTORICAL CHANGES IN ANY OF THE 12 SYSTEMS REVIEWED OTHER THAN HIS ISSUES WITH THE EYE AND NEED FOR SURGERY     Objective   /60   Pulse 65   Temp 36.4 °C (97.6 °F)   Wt 84.8 kg (187 lb)   SpO2 97%   BMI 29.29 kg/m²     Physical ExamSINCE RECENT VISIT NO INTERVAL PHYSICAL CHANGES IN ANY OF THE 12 SYSTEMS REVIEWED OTHER THANSINCE RECENT VISIT NO INTERVAL PHYSICAL CHANGES IN ANY OF THE 12 SYSTEMS REVIEWED OTHER THAN NEED TO LEARN MORE ABOUT THE EYE ISSUES AND DO DRIDGING WITH HIS A FIB     Assessment/Plan   Problem List Items Addressed This Visit             ICD-10-CM    Paroxysmal atrial fibrillation (Multi) I48.0    Relevant Medications    enoxaparin (Lovenox) 30 mg/0.3 mL syringe    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

## 2025-02-06 NOTE — NURSING NOTE
Patient was called and patient's wife joined the conversation.  Patient was given their OR date and educated regarding their need to have their anticoagulation cessation approved by their PCP for their surgery.  They verbalized understanding of the anticoagulation education as well as the need to have their pacemaker interrogated and to contact the [facility] with that information.  Doctor  has been notified to mail surgical packets and paperwork to their address.    2/12/25 - Called patient and spoke with their wife as well.  They have an appointment on the 25th for their pacemaker and they will make an appointment for their cardiologist to sign off on their blood thinner paperwork.  They are ok with moving their surgery to 3/7/25 at [facility].  They verbalize understanding of when the patient needs to stop taking their blood thinners prior to surgery.    2/19/25 - Spoke to patient's spouse regarding the pacemaker and blood thinner education and paperwork to be filled out.  Patient verbalizes understanding and is meeting with their PCP today about the blood thinners.  Patient will be meeting on the 25th for the pacemaker evaluation and verbalized that they will send the packet back based on the instructions on the [facility] packet.    2/24/25 - Patient blood thinner paperwork confirmed in .  Spoke with spouse today and confirmed 2/25/25 will be pacemaker interrogation.  Patient verbalized understanding on how to process packet and return to our office.    2/27/25 - Reached out to the patient today but no answer.  Could not reach voicemail.  Will try to contact in afternoon regarding pacemaker paperwork.

## 2025-02-10 ENCOUNTER — HOSPITAL ENCOUNTER (OUTPATIENT)
Dept: RADIOLOGY | Facility: HOSPITAL | Age: 85
Discharge: HOME | End: 2025-02-10
Payer: MEDICARE

## 2025-02-10 VITALS
OXYGEN SATURATION: 100 % | HEART RATE: 41 BPM | DIASTOLIC BLOOD PRESSURE: 62 MMHG | SYSTOLIC BLOOD PRESSURE: 152 MMHG | RESPIRATION RATE: 18 BRPM

## 2025-02-10 DIAGNOSIS — R68.89 ABNORMAL FINDING: ICD-10-CM

## 2025-02-10 DIAGNOSIS — C85.91 LYMPHOMA OF LYMPH NODES OF NECK, UNSPECIFIED LYMPHOMA TYPE (MULTI): ICD-10-CM

## 2025-02-10 PROCEDURE — 99152 MOD SED SAME PHYS/QHP 5/>YRS: CPT

## 2025-02-10 PROCEDURE — 99152 MOD SED SAME PHYS/QHP 5/>YRS: CPT | Performed by: RADIOLOGY

## 2025-02-10 PROCEDURE — C1889 IMPLANT/INSERT DEVICE, NOC: HCPCS

## 2025-02-10 PROCEDURE — 7100000010 HC PHASE TWO TIME - EACH INCREMENTAL 1 MINUTE

## 2025-02-10 PROCEDURE — 2720000007 HC OR 272 NO HCPCS

## 2025-02-10 PROCEDURE — 2500000004 HC RX 250 GENERAL PHARMACY W/ HCPCS (ALT 636 FOR OP/ED): Performed by: RADIOLOGY

## 2025-02-10 PROCEDURE — 76942 ECHO GUIDE FOR BIOPSY: CPT

## 2025-02-10 PROCEDURE — 7100000009 HC PHASE TWO TIME - INITIAL BASE CHARGE

## 2025-02-10 PROCEDURE — 76536 US EXAM OF HEAD AND NECK: CPT | Performed by: RADIOLOGY

## 2025-02-10 PROCEDURE — 76536 US EXAM OF HEAD AND NECK: CPT

## 2025-02-10 RX ORDER — MIDAZOLAM HYDROCHLORIDE 1 MG/ML
INJECTION INTRAMUSCULAR; INTRAVENOUS
Status: COMPLETED | OUTPATIENT
Start: 2025-02-10 | End: 2025-02-10

## 2025-02-10 RX ORDER — FENTANYL CITRATE 50 UG/ML
INJECTION, SOLUTION INTRAMUSCULAR; INTRAVENOUS
Status: COMPLETED | OUTPATIENT
Start: 2025-02-10 | End: 2025-02-10

## 2025-02-10 RX ADMIN — FENTANYL CITRATE 100 MCG: 50 INJECTION, SOLUTION INTRAMUSCULAR; INTRAVENOUS at 09:23

## 2025-02-10 RX ADMIN — MIDAZOLAM HYDROCHLORIDE 1 MG: 1 INJECTION, SOLUTION INTRAMUSCULAR; INTRAVENOUS at 09:23

## 2025-02-10 ASSESSMENT — PAIN SCALES - GENERAL
PAINLEVEL_OUTOF10: 0 - NO PAIN

## 2025-02-10 ASSESSMENT — PAIN - FUNCTIONAL ASSESSMENT: PAIN_FUNCTIONAL_ASSESSMENT: 0-10

## 2025-02-19 ENCOUNTER — APPOINTMENT (OUTPATIENT)
Dept: PRIMARY CARE | Facility: CLINIC | Age: 85
End: 2025-02-19
Payer: MEDICARE

## 2025-02-19 VITALS
WEIGHT: 188 LBS | HEART RATE: 65 BPM | DIASTOLIC BLOOD PRESSURE: 82 MMHG | BODY MASS INDEX: 29.44 KG/M2 | OXYGEN SATURATION: 98 % | SYSTOLIC BLOOD PRESSURE: 138 MMHG | TEMPERATURE: 97.5 F

## 2025-02-19 DIAGNOSIS — I48.0 PAROXYSMAL ATRIAL FIBRILLATION (MULTI): ICD-10-CM

## 2025-02-19 DIAGNOSIS — H05.89 ORBITAL MASS: Primary | ICD-10-CM

## 2025-02-19 PROCEDURE — 99214 OFFICE O/P EST MOD 30 MIN: CPT | Performed by: FAMILY MEDICINE

## 2025-02-19 PROCEDURE — 3079F DIAST BP 80-89 MM HG: CPT | Performed by: FAMILY MEDICINE

## 2025-02-19 PROCEDURE — 1159F MED LIST DOCD IN RCRD: CPT | Performed by: FAMILY MEDICINE

## 2025-02-19 PROCEDURE — 3075F SYST BP GE 130 - 139MM HG: CPT | Performed by: FAMILY MEDICINE

## 2025-02-19 PROCEDURE — 1123F ACP DISCUSS/DSCN MKR DOCD: CPT | Performed by: FAMILY MEDICINE

## 2025-02-19 PROCEDURE — 1036F TOBACCO NON-USER: CPT | Performed by: FAMILY MEDICINE

## 2025-02-19 ASSESSMENT — PATIENT HEALTH QUESTIONNAIRE - PHQ9
SUM OF ALL RESPONSES TO PHQ9 QUESTIONS 1 AND 2: 0
1. LITTLE INTEREST OR PLEASURE IN DOING THINGS: NOT AT ALL
2. FEELING DOWN, DEPRESSED OR HOPELESS: NOT AT ALL

## 2025-02-19 NOTE — PROGRESS NOTES
Subjective   Patient ID: Donavon Brush is a 84 y.o. male who presents for Pre-op Clearance (3/7/2025-L eye biopsy- clearance note for Coumadin.).    HPI NEEDS CLEARANCE FOR ME AND CARDIOLOGY FOR THE PLANNED EYE SURGERY   HOLD COUMADIN 5 DAYS PRIOR THEN RESUME THAT NIGHT OF     Review of SystemsSINCE THE  VISIT NO INTERVAL HISTORICAL CHANGES IN ANY OF THE 12 SYSTEMS REVIEWED OTHER THAN CONTINUED POOR VISION IN THE EYE AND HIS A FIB     Objective   /82   Pulse 65   Temp 36.4 °C (97.5 °F)   Wt 85.3 kg (188 lb)   SpO2 98%   BMI 29.44 kg/m²     Physical ExamSINCE RECENT VISIT NO INTERVAL PHYSICAL CHANGES IN ANY OF THE 12 SYSTEMS REVIEWED OTHER THAN POOR VISION AND HIS A FIB     Assessment/Plan   Problem List Items Addressed This Visit             ICD-10-CM    Paroxysmal atrial fibrillation (Multi) I48.0    Orbital mass - Primary H05.89

## 2025-02-19 NOTE — PATIENT INSTRUCTIONS
THE 7TH OF MARCH   FOLLOW WITH DR GODOY ABOUT THE PACEMAKER   YOU MAY HOLD THE COUMADIN 5  DAYS PRIOR TO THE SURGERY /RESUME THAT NIGHT

## 2025-02-24 DIAGNOSIS — Z95.0 PRESENCE OF CARDIAC PACEMAKER: Primary | ICD-10-CM

## 2025-02-24 DIAGNOSIS — I48.0 PAROXYSMAL ATRIAL FIBRILLATION (MULTI): ICD-10-CM

## 2025-02-25 ENCOUNTER — HOSPITAL ENCOUNTER (OUTPATIENT)
Dept: CARDIOLOGY | Facility: CLINIC | Age: 85
Discharge: HOME | End: 2025-02-25
Payer: MEDICARE

## 2025-02-25 DIAGNOSIS — I48.0 PAROXYSMAL ATRIAL FIBRILLATION (MULTI): ICD-10-CM

## 2025-02-25 DIAGNOSIS — Z95.0 PRESENCE OF CARDIAC PACEMAKER: ICD-10-CM

## 2025-02-25 DIAGNOSIS — I49.5 TACHYCARDIA-BRADYCARDIA (MULTI): ICD-10-CM

## 2025-02-25 LAB
CELL COUNT (BLOOD): 2.82 X10*3/UL
CELL POPULATIONS: NORMAL
DIAGNOSIS: NORMAL
FLOW DIFFERENTIAL: NORMAL
FLOW TEST ORDERED: NORMAL
LAB AP ASR DISCLAIMER: NORMAL
LAB TEST METHOD: NORMAL
LABORATORY COMMENT REPORT: NORMAL
NUMBER OF CELLS COLLECTED: NORMAL PER TUBE
PATH REPORT.FINAL DX SPEC: NORMAL
PATH REPORT.GROSS SPEC: NORMAL
PATH REPORT.RELEVANT HX SPEC: NORMAL
PATH REPORT.TOTAL CANCER: NORMAL
PATH REPORT.TOTAL CANCER: NORMAL
SIGNATURE COMMENT: NORMAL
SPECIMEN VIABILITY: NORMAL

## 2025-02-26 ENCOUNTER — TELEPHONE (OUTPATIENT)
Dept: HEMATOLOGY/ONCOLOGY | Facility: CLINIC | Age: 85
End: 2025-02-26
Payer: MEDICARE

## 2025-02-27 ENCOUNTER — TELEPHONE (OUTPATIENT)
Dept: HEMATOLOGY/ONCOLOGY | Facility: CLINIC | Age: 85
End: 2025-02-27
Payer: MEDICARE

## 2025-02-27 NOTE — TELEPHONE ENCOUNTER
Called and spoke with wife and advised her per Dr. Vallecillo, Donavon still needs to get the biopsy completed. Rip verbalized understanding using the teach back method. No further questions at this time.

## 2025-03-03 ENCOUNTER — APPOINTMENT (OUTPATIENT)
Dept: PRIMARY CARE | Facility: CLINIC | Age: 85
End: 2025-03-03
Payer: MEDICARE

## 2025-03-05 LAB
ELECTRONICALLY SIGNED BY: NORMAL
LYMPHOID NGS RESULTS: NORMAL

## 2025-03-05 PROCEDURE — 81450 HL NEO GSAP 5-50DNA/DNA&RNA: CPT | Performed by: SURGERY

## 2025-03-05 PROCEDURE — G0452 MOLECULAR PATHOLOGY INTERPR: HCPCS | Performed by: SURGERY

## 2025-03-06 ENCOUNTER — DOCUMENTATION (OUTPATIENT)
Dept: OPHTHALMOLOGY | Facility: CLINIC | Age: 85
End: 2025-03-06
Payer: MEDICARE

## 2025-03-06 ENCOUNTER — ANESTHESIA EVENT (OUTPATIENT)
Dept: OPERATING ROOM | Facility: CLINIC | Age: 85
End: 2025-03-06
Payer: MEDICARE

## 2025-03-06 DIAGNOSIS — H05.89 ORBITAL MASS: Primary | ICD-10-CM

## 2025-03-06 DIAGNOSIS — C85.90 LYMPHOMA, UNSPECIFIED BODY REGION, UNSPECIFIED LYMPHOMA TYPE (MULTI): Primary | ICD-10-CM

## 2025-03-06 LAB — TEST COMMENT - SURGICAL SENDOUT REQUEST: NORMAL

## 2025-03-06 RX ORDER — ACETAMINOPHEN 325 MG/1
650 TABLET ORAL EVERY 4 HOURS PRN
Status: CANCELLED | OUTPATIENT
Start: 2025-03-06

## 2025-03-06 RX ORDER — LIDOCAINE HYDROCHLORIDE 10 MG/ML
0.1 INJECTION, SOLUTION EPIDURAL; INFILTRATION; INTRACAUDAL; PERINEURAL ONCE
OUTPATIENT
Start: 2025-03-06 | End: 2025-03-06

## 2025-03-06 RX ORDER — OXYCODONE HYDROCHLORIDE 5 MG/1
5 TABLET ORAL EVERY 4 HOURS PRN
Status: CANCELLED | OUTPATIENT
Start: 2025-03-06

## 2025-03-06 RX ORDER — FENTANYL CITRATE 50 UG/ML
50 INJECTION, SOLUTION INTRAMUSCULAR; INTRAVENOUS EVERY 5 MIN PRN
Status: CANCELLED | OUTPATIENT
Start: 2025-03-06

## 2025-03-06 RX ORDER — LABETALOL HYDROCHLORIDE 5 MG/ML
5 INJECTION, SOLUTION INTRAVENOUS ONCE AS NEEDED
Status: CANCELLED | OUTPATIENT
Start: 2025-03-06

## 2025-03-06 RX ORDER — FENTANYL CITRATE 50 UG/ML
25 INJECTION, SOLUTION INTRAMUSCULAR; INTRAVENOUS EVERY 5 MIN PRN
Status: CANCELLED | OUTPATIENT
Start: 2025-03-06

## 2025-03-06 RX ORDER — ONDANSETRON HYDROCHLORIDE 2 MG/ML
4 INJECTION, SOLUTION INTRAVENOUS ONCE AS NEEDED
Status: CANCELLED | OUTPATIENT
Start: 2025-03-06

## 2025-03-06 NOTE — NURSING NOTE
Spoke with the patient's spouse.  States the patient hasn't been taking their blood thinners as instructed and confirmed that the surgery center has reached out regarding their time.  Patient will be NPO at midnight.

## 2025-03-07 ENCOUNTER — HOSPITAL ENCOUNTER (OUTPATIENT)
Facility: CLINIC | Age: 85
Setting detail: OUTPATIENT SURGERY
Discharge: HOME | End: 2025-03-07
Payer: MEDICARE

## 2025-03-07 ENCOUNTER — ANESTHESIA (OUTPATIENT)
Dept: OPERATING ROOM | Facility: CLINIC | Age: 85
End: 2025-03-07
Payer: MEDICARE

## 2025-03-07 VITALS
TEMPERATURE: 96.8 F | OXYGEN SATURATION: 98 % | HEIGHT: 68 IN | SYSTOLIC BLOOD PRESSURE: 147 MMHG | RESPIRATION RATE: 16 BRPM | HEART RATE: 60 BPM | WEIGHT: 186.29 LBS | BODY MASS INDEX: 28.23 KG/M2 | DIASTOLIC BLOOD PRESSURE: 63 MMHG

## 2025-03-07 DIAGNOSIS — H57.12 PAIN OF LEFT EYE: ICD-10-CM

## 2025-03-07 DIAGNOSIS — H05.89 ORBITAL MASS: ICD-10-CM

## 2025-03-07 PROCEDURE — 7100000009 HC PHASE TWO TIME - INITIAL BASE CHARGE

## 2025-03-07 PROCEDURE — 2500000001 HC RX 250 WO HCPCS SELF ADMINISTERED DRUGS (ALT 637 FOR MEDICARE OP): Performed by: ANESTHESIOLOGY

## 2025-03-07 PROCEDURE — 3700000002 HC GENERAL ANESTHESIA TIME - EACH INCREMENTAL 1 MINUTE

## 2025-03-07 PROCEDURE — 7100000001 HC RECOVERY ROOM TIME - INITIAL BASE CHARGE

## 2025-03-07 PROCEDURE — 3600000003 HC OR TIME - INITIAL BASE CHARGE - PROCEDURE LEVEL THREE

## 2025-03-07 PROCEDURE — 2500000005 HC RX 250 GENERAL PHARMACY W/O HCPCS

## 2025-03-07 PROCEDURE — 7100000010 HC PHASE TWO TIME - EACH INCREMENTAL 1 MINUTE

## 2025-03-07 PROCEDURE — 2500000004 HC RX 250 GENERAL PHARMACY W/ HCPCS (ALT 636 FOR OP/ED): Performed by: ANESTHESIOLOGIST ASSISTANT

## 2025-03-07 PROCEDURE — 3600000008 HC OR TIME - EACH INCREMENTAL 1 MINUTE - PROCEDURE LEVEL THREE

## 2025-03-07 PROCEDURE — 88184 FLOWCYTOMETRY/ TC 1 MARKER: CPT | Mod: TC

## 2025-03-07 PROCEDURE — 2500000004 HC RX 250 GENERAL PHARMACY W/ HCPCS (ALT 636 FOR OP/ED)

## 2025-03-07 PROCEDURE — 88313 SPECIAL STAINS GROUP 2: CPT | Mod: TC,SUR

## 2025-03-07 PROCEDURE — 2500000005 HC RX 250 GENERAL PHARMACY W/O HCPCS: Performed by: ANESTHESIOLOGIST ASSISTANT

## 2025-03-07 PROCEDURE — 88185 FLOWCYTOMETRY/TC ADD-ON: CPT | Mod: TC

## 2025-03-07 PROCEDURE — 7100000002 HC RECOVERY ROOM TIME - EACH INCREMENTAL 1 MINUTE

## 2025-03-07 PROCEDURE — 67400 EXPLORE/BIOPSY EYE SOCKET: CPT

## 2025-03-07 PROCEDURE — 3700000001 HC GENERAL ANESTHESIA TIME - INITIAL BASE CHARGE

## 2025-03-07 PROCEDURE — 2720000007 HC OR 272 NO HCPCS

## 2025-03-07 RX ORDER — SODIUM CHLORIDE, SODIUM LACTATE, POTASSIUM CHLORIDE, CALCIUM CHLORIDE 600; 310; 30; 20 MG/100ML; MG/100ML; MG/100ML; MG/100ML
100 INJECTION, SOLUTION INTRAVENOUS CONTINUOUS
Status: SHIPPED | OUTPATIENT
Start: 2025-03-07 | End: 2025-03-07

## 2025-03-07 RX ORDER — LIDOCAINE HYDROCHLORIDE AND EPINEPHRINE 10; 10 UG/ML; MG/ML
INJECTION, SOLUTION INFILTRATION; PERINEURAL AS NEEDED
Status: DISCONTINUED | OUTPATIENT
Start: 2025-03-07 | End: 2025-03-07 | Stop reason: HOSPADM

## 2025-03-07 RX ORDER — FENTANYL CITRATE 50 UG/ML
INJECTION, SOLUTION INTRAMUSCULAR; INTRAVENOUS AS NEEDED
Status: DISCONTINUED | OUTPATIENT
Start: 2025-03-07 | End: 2025-03-07

## 2025-03-07 RX ORDER — ONDANSETRON HYDROCHLORIDE 2 MG/ML
INJECTION, SOLUTION INTRAVENOUS AS NEEDED
Status: DISCONTINUED | OUTPATIENT
Start: 2025-03-07 | End: 2025-03-07

## 2025-03-07 RX ORDER — SODIUM CHLORIDE, SODIUM LACTATE, POTASSIUM CHLORIDE, CALCIUM CHLORIDE 600; 310; 30; 20 MG/100ML; MG/100ML; MG/100ML; MG/100ML
INJECTION, SOLUTION INTRAVENOUS CONTINUOUS PRN
Status: DISCONTINUED | OUTPATIENT
Start: 2025-03-07 | End: 2025-03-07

## 2025-03-07 RX ORDER — LABETALOL HYDROCHLORIDE 5 MG/ML
5 INJECTION, SOLUTION INTRAVENOUS ONCE AS NEEDED
Status: DISCONTINUED | OUTPATIENT
Start: 2025-03-07 | End: 2025-03-07 | Stop reason: HOSPADM

## 2025-03-07 RX ORDER — ACETAMINOPHEN 325 MG/1
650 TABLET ORAL EVERY 4 HOURS PRN
Status: DISCONTINUED | OUTPATIENT
Start: 2025-03-07 | End: 2025-03-07

## 2025-03-07 RX ORDER — POVIDONE-IODINE 5 %
SOLUTION, NON-ORAL OPHTHALMIC (EYE) AS NEEDED
Status: DISCONTINUED | OUTPATIENT
Start: 2025-03-07 | End: 2025-03-07 | Stop reason: HOSPADM

## 2025-03-07 RX ORDER — OXYCODONE HYDROCHLORIDE 5 MG/1
5 TABLET ORAL EVERY 4 HOURS PRN
Status: DISCONTINUED | OUTPATIENT
Start: 2025-03-07 | End: 2025-03-07 | Stop reason: HOSPADM

## 2025-03-07 RX ORDER — LIDOCAINE HYDROCHLORIDE 20 MG/ML
INJECTION, SOLUTION INFILTRATION; PERINEURAL AS NEEDED
Status: DISCONTINUED | OUTPATIENT
Start: 2025-03-07 | End: 2025-03-07

## 2025-03-07 RX ORDER — NORETHINDRONE AND ETHINYL ESTRADIOL 0.5-0.035
KIT ORAL AS NEEDED
Status: DISCONTINUED | OUTPATIENT
Start: 2025-03-07 | End: 2025-03-07

## 2025-03-07 RX ORDER — ONDANSETRON HYDROCHLORIDE 2 MG/ML
4 INJECTION, SOLUTION INTRAVENOUS ONCE AS NEEDED
Status: DISCONTINUED | OUTPATIENT
Start: 2025-03-07 | End: 2025-03-07 | Stop reason: HOSPADM

## 2025-03-07 RX ORDER — BUPIVACAINE HYDROCHLORIDE 7.5 MG/ML
INJECTION, SOLUTION EPIDURAL; RETROBULBAR AS NEEDED
Status: DISCONTINUED | OUTPATIENT
Start: 2025-03-07 | End: 2025-03-07 | Stop reason: HOSPADM

## 2025-03-07 RX ORDER — PROPOFOL 10 MG/ML
INJECTION, EMULSION INTRAVENOUS CONTINUOUS PRN
Status: DISCONTINUED | OUTPATIENT
Start: 2025-03-07 | End: 2025-03-07

## 2025-03-07 RX ORDER — ACETAMINOPHEN 325 MG/1
650 TABLET ORAL EVERY 4 HOURS PRN
Status: DISCONTINUED | OUTPATIENT
Start: 2025-03-07 | End: 2025-03-07 | Stop reason: HOSPADM

## 2025-03-07 RX ORDER — WATER 1 ML/ML
IRRIGANT IRRIGATION AS NEEDED
Status: DISCONTINUED | OUTPATIENT
Start: 2025-03-07 | End: 2025-03-07 | Stop reason: HOSPADM

## 2025-03-07 RX ORDER — LIDOCAINE HYDROCHLORIDE 10 MG/ML
0.1 INJECTION, SOLUTION EPIDURAL; INFILTRATION; INTRACAUDAL; PERINEURAL ONCE
Status: DISCONTINUED | OUTPATIENT
Start: 2025-03-07 | End: 2025-03-07 | Stop reason: HOSPADM

## 2025-03-07 RX ORDER — FENTANYL CITRATE 50 UG/ML
50 INJECTION, SOLUTION INTRAMUSCULAR; INTRAVENOUS EVERY 5 MIN PRN
Status: DISCONTINUED | OUTPATIENT
Start: 2025-03-07 | End: 2025-03-07 | Stop reason: HOSPADM

## 2025-03-07 RX ORDER — PHENYLEPHRINE HYDROCHLORIDE 10 MG/ML
INJECTION INTRAVENOUS AS NEEDED
Status: DISCONTINUED | OUTPATIENT
Start: 2025-03-07 | End: 2025-03-07

## 2025-03-07 RX ORDER — NEOMYCIN SULFATE, POLYMYXIN B SULFATE, BACITRACIN ZINC, HYDROCORTISONE 3.5; 10000; 400; 1 MG/G; [USP'U]/G; [USP'U]/G; MG/G
OINTMENT OPHTHALMIC
Qty: 3.5 G | Refills: 0 | Status: SHIPPED | OUTPATIENT
Start: 2025-03-07

## 2025-03-07 RX ORDER — FENTANYL CITRATE 50 UG/ML
25 INJECTION, SOLUTION INTRAMUSCULAR; INTRAVENOUS EVERY 5 MIN PRN
Status: DISCONTINUED | OUTPATIENT
Start: 2025-03-07 | End: 2025-03-07 | Stop reason: HOSPADM

## 2025-03-07 RX ADMIN — PHENYLEPHRINE HYDROCHLORIDE 200 MCG: 10 INJECTION INTRAVENOUS at 08:08

## 2025-03-07 RX ADMIN — EPHEDRINE SULFATE 10 MG: 50 INJECTION INTRAVENOUS at 08:08

## 2025-03-07 RX ADMIN — PROPOFOL 25 MCG/KG/MIN: 10 INJECTION, EMULSION INTRAVENOUS at 07:45

## 2025-03-07 RX ADMIN — PHENYLEPHRINE HYDROCHLORIDE 240 MCG: 10 INJECTION INTRAVENOUS at 07:47

## 2025-03-07 RX ADMIN — FENTANYL CITRATE 25 MCG: 0.05 INJECTION, SOLUTION INTRAMUSCULAR; INTRAVENOUS at 07:50

## 2025-03-07 RX ADMIN — EPHEDRINE SULFATE 5 MG: 50 INJECTION INTRAVENOUS at 08:59

## 2025-03-07 RX ADMIN — ACETAMINOPHEN 650 MG: 325 TABLET ORAL at 11:01

## 2025-03-07 RX ADMIN — EPHEDRINE SULFATE 10 MG: 50 INJECTION INTRAVENOUS at 08:31

## 2025-03-07 RX ADMIN — EPHEDRINE SULFATE 5 MG: 50 INJECTION INTRAVENOUS at 08:41

## 2025-03-07 RX ADMIN — EPHEDRINE SULFATE 10 MG: 50 INJECTION INTRAVENOUS at 08:19

## 2025-03-07 RX ADMIN — EPHEDRINE SULFATE 5 MG: 50 INJECTION INTRAVENOUS at 09:07

## 2025-03-07 RX ADMIN — SODIUM CHLORIDE, POTASSIUM CHLORIDE, SODIUM LACTATE AND CALCIUM CHLORIDE: 600; 310; 30; 20 INJECTION, SOLUTION INTRAVENOUS at 07:34

## 2025-03-07 RX ADMIN — PHENYLEPHRINE HYDROCHLORIDE 200 MCG: 10 INJECTION INTRAVENOUS at 08:17

## 2025-03-07 RX ADMIN — ONDANSETRON 4 MG: 2 INJECTION INTRAMUSCULAR; INTRAVENOUS at 09:24

## 2025-03-07 RX ADMIN — PHENYLEPHRINE HYDROCHLORIDE 120 MCG: 10 INJECTION INTRAVENOUS at 08:15

## 2025-03-07 RX ADMIN — PHENYLEPHRINE HYDROCHLORIDE 120 MCG: 10 INJECTION INTRAVENOUS at 07:44

## 2025-03-07 RX ADMIN — PROPOFOL 50 MG: 10 INJECTION, EMULSION INTRAVENOUS at 07:50

## 2025-03-07 RX ADMIN — PHENYLEPHRINE HYDROCHLORIDE 200 MCG: 10 INJECTION INTRAVENOUS at 08:19

## 2025-03-07 RX ADMIN — PROPOFOL 150 MG: 10 INJECTION, EMULSION INTRAVENOUS at 07:42

## 2025-03-07 RX ADMIN — EPHEDRINE SULFATE 5 MG: 50 INJECTION INTRAVENOUS at 08:47

## 2025-03-07 RX ADMIN — LIDOCAINE HYDROCHLORIDE 75 MG: 20 INJECTION, SOLUTION INFILTRATION; PERINEURAL at 07:42

## 2025-03-07 RX ADMIN — FENTANYL CITRATE 25 MCG: 0.05 INJECTION, SOLUTION INTRAMUSCULAR; INTRAVENOUS at 08:26

## 2025-03-07 RX ADMIN — FENTANYL CITRATE 50 MCG: 0.05 INJECTION, SOLUTION INTRAMUSCULAR; INTRAVENOUS at 07:42

## 2025-03-07 ASSESSMENT — PAIN - FUNCTIONAL ASSESSMENT
PAIN_FUNCTIONAL_ASSESSMENT: 0-10
PAIN_FUNCTIONAL_ASSESSMENT: 0-10
PAIN_FUNCTIONAL_ASSESSMENT: UNABLE TO SELF-REPORT
PAIN_FUNCTIONAL_ASSESSMENT: 0-10
PAIN_FUNCTIONAL_ASSESSMENT: 0-10

## 2025-03-07 ASSESSMENT — PAIN DESCRIPTION - ORIENTATION: ORIENTATION: LEFT

## 2025-03-07 ASSESSMENT — PAIN DESCRIPTION - LOCATION: LOCATION: EYE

## 2025-03-07 ASSESSMENT — PAIN SCALES - GENERAL
PAINLEVEL_OUTOF10: 5 - MODERATE PAIN
PAINLEVEL_OUTOF10: 0 - NO PAIN
PAINLEVEL_OUTOF10: 3
PAINLEVEL_OUTOF10: 3

## 2025-03-07 NOTE — BRIEF OP NOTE
Date: 3/7/2025  OR Location: [facility] [place]    Name: Donavon Brush YOB: 1940, Age: 84 y.o., MRN: [id number], Sex: male    Diagnosis  Pre-op Diagnosis      * Orbital mass [H05.89] Post-op Diagnosis     * Orbital mass [H05.89]     Procedures  ORBITOTOMY  17021 - ND ORBITOTOMY W/O BONE FLAP W/REMOVAL LESION      Surgeons      * Ike Bradley - Primary    Resident/Fellow/Other Assistant:  Surgeons and Role:  * No surgeons found with a matching role *    Staff:   Kodyulator: Laly  Scrub Person: Ashley  Scrub Person: Jeanne    Anesthesia Staff: Anesthesiologist: Georges Lopez DO  C-AA: CHRISTA Yepez    Procedure Summary  Anesthesia: General  ASA: III  Estimated Blood Loss: 5mL  Intra-op Medications:   Administrations occurring from 0715 to 1045 on 25:   Medication Name Total Dose   lidocaine-epinephrine (Xylocaine W/EPI) 1 %-1:100,000 injection 1.5 mL   bupivacaine PF (Marcaine) injection 0.75 % 1.5 mL   sterile water irrigation solution 100 mL   povidone-iodine 5 % ophthalmic solution 1 Application   balanced salts (BSS) intraocular solution 15 mL   ePHEDrine 50 mg/mL 50 mg   fentaNYL PF 0.05 mg/mL 100 mcg   LR infusion Cannot be calculated   lidocaine (Xylocaine) injection 2 % 75 mg   ondansetron 2 mg/mL 4 mg   phenylephrine (Magdaleno-Synephrine) injection 1,080 mcg   propofol (Diprivan) infusion 10 mg/mL 383.79 mg              Anesthesia Record               Intraprocedure I/O Totals          Intake    Propofol Drip 0.00 mL    The total shown is the total volume documented since Anesthesia Start was filed.    LR infusion 700.00 mL    Total Intake 700 mL          Specimen:   ID Type Source Tests Collected by Time   1 : LEFT POSTERIOR ORBIT Tissue EYE BIOPSY LEFT SURGICAL PATHOLOGY EXAM Ike Bradley MD 3/7/2025 0810   2 : LEFT ANTERIOR ORBIT Tissue EYE BIOPSY LEFT SURGICAL PATHOLOGY EXAM Ike Bradley MD 3/7/2025 0829   3 : LEFT POSTERIOR ORBIT FRESH Tissue EYE BIOPSY LEFT SURGICAL PATHOLOGY EXAM Ike  MD [facility] 3/7/2025 0844   4 : LEFT ANTERIOR ORBIT     FRESH Tissue EYE BIOPSY LEFT SURGICAL PATHOLOGY EXAM Ike Bradley MD 3/7/2025 0844                  Findings: inferior orbital lesion    Complications:  None; patient tolerated the procedure well.     Disposition: PACU - hemodynamically stable.  Condition: stable  Specimens Collected:   ID Type Source Tests Collected by Time   1 : LEFT POSTERIOR ORBIT Tissue EYE BIOPSY LEFT SURGICAL PATHOLOGY EXAM Ike Bradley MD 3/7/2025 0810   2 : LEFT ANTERIOR ORBIT Tissue EYE BIOPSY LEFT SURGICAL PATHOLOGY EXAM Ike Braldey MD 3/7/2025 0829   3 : LEFT POSTERIOR ORBIT FRESH Tissue EYE BIOPSY LEFT SURGICAL PATHOLOGY EXAM Ike Bradley MD 3/7/2025 0844   4 : LEFT ANTERIOR ORBIT     FRESH Tissue EYE BIOPSY LEFT SURGICAL PATHOLOGY EXAM Ike Bradley MD 3/7/2025 0844     Attending Attestation:     Ike Bradley  Phone Number: [phone]

## 2025-03-07 NOTE — H&P
History Of Present Illness  Donavon Brush is a 84 y.o. male presenting with left orbital lesion here for left inferior orbitotomy.     Past Medical History  Past Medical History:   Diagnosis Date    A-fib (Multi)     Chronic sinusitis, unspecified 05/11/2017    Sinobronchitis    Coronary artery disease     Disease of thyroid gland     Eye cancer, left (Multi)     Hypertension     Hypothyroidism     Hypothyroidism, unspecified 10/12/2022    Adult hypothyroidism    Other specified disorders of eye and adnexa 05/08/2019    Irritation of right eye    Personal history of other diseases of the respiratory system 04/10/2015    History of acute bronchitis    Personal history of other diseases of the respiratory system 06/14/2016    History of acute sinusitis       Surgical History  Past Surgical History:   Procedure Laterality Date    AORTIC VALVE REPLACEMENT  2003    CARDIAC PACEMAKER PLACEMENT  09/22/2014    Pacemaker Placement    CATARACT EXTRACTION Bilateral     FRACTURE SURGERY      shoulder fracture    KNEE ARTHROPLASTY Left     PROSTATE SURGERY      TURP        Social History  He reports that he quit smoking about 25 years ago. His smoking use included cigarettes. He has never used smokeless tobacco. He reports current alcohol use of about 7.0 standard drinks of alcohol per week. He reports that he does not use drugs.    Family History  Family History   Problem Relation Name Age of Onset    Alzheimer's disease Mother          passed at 84    No Known Problems Father          passed at 66    Heart disease Brother Enrrique     No Known Problems Daughter      No Known Problems Son      No Known Problems Son          Allergies  Patient has no known allergies.    Review of Systems   All other systems reviewed and are negative.       Physical Exam  HENT:      Head: Normocephalic.   Cardiovascular:      Rate and Rhythm: Normal rate.   Pulmonary:      Effort: Pulmonary effort is normal.   Neurological:      Mental Status: He is  "alert.          Last Recorded Vitals  Blood pressure 165/72, pulse 52, temperature 36.2 °C (97.2 °F), temperature source Temporal, resp. rate 18, height 1.727 m (5' 8\"), weight 84.5 kg (186 lb 4.6 oz), SpO2 96%.    Relevant Results             Assessment/Plan   Assessment & Plan  Orbital mass      84 y.o. male presenting with left orbital lesion here for left inferior orbitotomy.           Eamon Yung MD    "

## 2025-03-07 NOTE — ANESTHESIA PROCEDURE NOTES
Airway  Date/Time: 3/7/2025 7:44 AM  Urgency: elective    Airway not difficult    Staffing  Performed: CAA   Authorized by: Georges Lopez DO    Performed by: CHRISTA Yepez  Patient location during procedure: OR    Indications and Patient Condition  Indications for airway management: anesthesia  Spontaneous Ventilation: absent  Sedation level: deep  Preoxygenated: yes  Patient position: sniffing  Mask difficulty assessment: 0 - not attempted    Final Airway Details  Final airway type: supraglottic airway      Successful airway: classic  Size 5     Number of attempts at approach: 1  Number of other approaches attempted: 0

## 2025-03-07 NOTE — PROGRESS NOTES
Donavon last saw me on 1/17/25 in the company of his wife, Rip. He was referred to me by Dr. Chris for evaluation and biopsy of a left orbital lesion which was causing extensive enophthalmos of the left eye with a corresponding reduction of motility due to restriction of the extraocular muscles of the left eye. A PET/CT at the time demonstrated enhanced signals in several lymph nodes in the trunk, posterolateral to the left kidney,  several bilateral pulmonary nodules, the right supraclavicular region, and in a brain lesion as well. It was thought that this represented a metastatic process that was now involving the left orbit.    Since his appointment with me, our staff has been trying to coordinate surgery to undergo biopsy of the left orbital process. He was initially scheduled to have surgery on 2/17/25 but was ultimately unable to arrange the pre-operative medical clearance which was necessary due to his complicated medical history, pacemaker, and blood thinner status, so the surgery has been delayed (see related notes in chart). He is now scheduled to undergo orbitotomy with me tomorrow after completing the preoperative medical clearances and holding his blood thinners ahead of the high-risk surgery.    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 In light of this, I spoke directly with Dr. Chris today regarding the necessity of performing an orbital biopsy given that the presumed diagnosis of lymphoma. I emphasized the high risk of irreversible vision loss, diplopia, and other complication-related morbidities from the proposed orbitotomy. We ultimately agreed that orbital biopsy was necessary to obtain pathologic analysis to either confirm that the left orbital process was lymphoma, or to determine an alternative diagnosis. Notably, the appearance of the orbital process on imaging is not classic for lymphoma and thus a biopsy would  be medically necessary because the patient would not be able to undergo orbital radiation (i.e. the standard tx for orbital lymphoma), without first confirming its presence. Should the pathologic analysis yield an alternative diagnosis, then appropriate treatment other than orbital radiation may be arranged. Without biopsy, there is a risk that the orbital process, if untreated, may progress to cause vision loss, diplopia, or extension into the brain which may be life threatening.    I also spoke extensively on the phone today with both Donavon and his spouse Rip. I once again reviewed the surgical plan for left orbitotomy and subtotal biopsy of the lesion. I stressed that the potential complications include, but are not limited to, irreversible blindness, double vision, and the need for additional surgery. Donavon and his spouse both confirmed good understanding of the risks and confirmed their wishes to proceed with the proposed surgery. This was confirmed using the teach back method. They also understand that there is a high risk of bleeding in the postoperative period, which may also lead to the above complications. Thus, I stressed that we must remain in close contact after the surgery and that he may need to present to the ED for admission, observation, and management as deemed necessary. Donavon explicitly told me he is ok with going blind and/or have refractory double vision as a potential complication of this surgery.

## 2025-03-07 NOTE — PROGRESS NOTES
Pt seen in pacu approximately 2 hours after surgery. Minimal bruising. No edema. Globe soft. Eye is mildly injected but no chemosis. No active bleeding. No proptosis.      ccVA at near is improved to 20/70 OS. Feels good no significant pain other than FBS from sutures. Sutures intact. Hx of color blindness so CP deferred. No tonopen avail at ASC but globe does not feel taught and eyelids soft. PERRL with possible trace APD OS (present before surgery). Good motility in all directions (similar to clinic assessment) but confounded by significant enophthalmos.     Procedure performed successfully today and tolerated well with no complications. Postprocedural instructions and return precautions reviewed. Pt will RTC in 1-2 weeks for reassessment and review of pathology. Sooner with any concerns or acute changes. Pt given personal cell to call with any issues. They will stay in town until the early afternoon and call Dr. Bradley before heading home to confirm stability.

## 2025-03-07 NOTE — PROGRESS NOTES
Pt seen in pacu approximately 1 hour after surgery. Minimal bruising. No edema. Globe soft. Eye is mildly injected but no chemosis. No active bleeding. No proptosis.     ccVA at near is 20/100 OS but pt still recovering from anesthesia. Feels good no significant pain other than FBS from sutures. Sutures intact. Hx of color blindness so CP deferred. No tonopen avail at ASC but globe does not feel taught and eyelids soft. PERRL with possible trace APD OS (present before surgery). Good motility in all directions (similar to clinic assessment) but confounded by significant enophthalmos.    Procedure performed successfully today and tolerated well with no complications. Postprocedural instructions and return precautions reviewed. Pt will RTC in 1-2 weeks for reassessment and review of pathology. Sooner with any concerns or acute changes. Pt given my personal cell to call with any issues. Will keep pt in pacu for 1 hour longer which is the longest the ASC will allow. They will stay in town until the early afternoon and call me before heading home to confirm stability.

## 2025-03-07 NOTE — ANESTHESIA POSTPROCEDURE EVALUATION
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.89])    Surgeons: Ike Bradley MD Responsible Provider: Georges Lopez DO    Anesthesia Type: general ASA Status: 3            Anesthesia Type: general    Vitals Value Taken Time   /67 03/07/25 1013   Temp 36.1 °C (97 °F) 03/07/25 0948   Pulse 60 03/07/25 1013   Resp 16 03/07/25 1013   SpO2 95 % 03/07/25 1013       Anesthesia Post Evaluation    Patient location during evaluation: PACU  Patient participation: complete - patient participated  Level of consciousness: awake  Pain management: satisfactory to patient  Multimodal analgesia pain management approach  Airway patency: patent  Cardiovascular status: acceptable  Respiratory status: acceptable  Hydration status: acceptable  Postoperative Nausea and Vomiting: none    No notable events documented.

## 2025-03-07 NOTE — DISCHARGE INSTRUCTIONS
Apply ointment to left eye and left eyelids twice daily  No heavy lifting for 2 weeks  No bending over for 3 days  No getting head wet for 2 days, after 2 days you can gently dab the eye with a wet towel to clean it  Take Tylenol for pain but avoid Advil  May resume Warfarin tomorrow.  No eye rubbing.  Some pain and blurry vision is normal, however it should not be worsening significantly, if so please call Dr. Bradley at [phone].       If unable to reach out Dr. Bradley can call the following numbers.   Office phone (after hours): [phone]   Hospital : [phone] (call this number if unable to reach someone on the phone above) then ask for ophthalmologist on call.

## 2025-03-07 NOTE — ANESTHESIA PREPROCEDURE EVALUATION
Patient: Donavon Brush    Procedure Information       Date/Time: 03/07/25 0715    Procedure: ORBITOTOMY (Left)    Location: [facility] OR 01 / [facility]    Surgeons: Ike Bradley MD            Relevant Problems   Anesthesia (within normal limits)      Cardiac   (+) Cardiac pacemaker in situ   (+) HTN (hypertension)   (+) Longstanding persistent atrial fibrillation (Multi)   (+) Paroxysmal atrial fibrillation (Multi)   (+) Permanent atrial fibrillation (Multi)   (+) Tachycardia-bradycardia (Multi)      Pulmonary (within normal limits)      Neuro (within normal limits)      GI (within normal limits)      /Renal   (+) BPH (benign prostatic hyperplasia)      Liver (within normal limits)      Endocrine   (+) Acquired hypothyroidism   (+) Adult hypothyroidism      Hematology (within normal limits)      Musculoskeletal   (+) Calcium pyrophosphate arthropathy   (+) Post-traumatic osteoarthritis of right knee      HEENT (within normal limits)      ID (within normal limits)      Skin   (+) Basal cell carcinoma      GYN (within normal limits)       Clinical information reviewed:   Tobacco  Allergies  Meds   Med Hx  Surg Hx   Fam Hx  Soc Hx        NPO Detail:  NPO/Void Status  Date of Last Liquid: 03/06/25  Time of Last Liquid: 2000  Date of Last Solid: 03/06/25  Time of Last Solid: 2000         Physical Exam    Airway  Mallampati: I  TM distance: >3 FB  Neck ROM: full     Cardiovascular    Dental   (+) upper dentures, lower dentures     Pulmonary    Abdominal        Anesthesia Plan    History of general anesthesia?: yes  History of complications of general anesthesia?: no    ASA 3     general     intravenous induction   Anesthetic plan and risks discussed with patient.    Plan discussed with CRNA.

## 2025-03-07 NOTE — PROGRESS NOTES
Phoned pt on POD#0 at 6pm to check in on them following surgery earlier today. Pt reports they are doing well. No significant pain or vision loss. No specific concerns and pt is in overall good spirits.     Will continue with current plans until reassessment at upcoming follow-up visit. Pt advised to call, present to clinic, or present to ED with any acute changes or concerns.  They have my cell number and I have told them to call immediately with any postop concerns.

## 2025-03-08 ENCOUNTER — DOCUMENTATION (OUTPATIENT)
Dept: OPHTHALMOLOGY | Facility: CLINIC | Age: 85
End: 2025-03-08
Payer: MEDICARE

## 2025-03-08 NOTE — OP NOTE
EXPLORATION, ORBIT (L) Operative Note     Date: 3/7/2025  OR Location: [facility] OR    Name: Donavon Brush : 1940, Age: 84 y.o., MRN: [id number], Sex: male    Diagnosis  Pre-op Diagnosis      * Orbital mass [H05.89] Post-op Diagnosis     * Orbital mass [H05.89]     Procedures  EXPLORATION, ORBIT  67592 - [place] ORBITOTOMY W/O BONE FLAP EXPL W/WO BIOPSY    74731 is the only CPT code linked to this surgery     Surgeons      * Ike Bradley - Primary    Resident/Fellow/Other Assistant:  Eamon Jimenez MD (Resident, Assisting)    Staff:   Circulator: Laly  Scrub Person: Ashley Harrisub Person: Jeanne    Anesthesia Staff: Anesthesiologist: Georges Lopez DO  C-AA: CHRISTA Yepez    Procedure Summary  Anesthesia: General  ASA: III  Estimated Blood Loss: 20mL  Intra-op Medications:   Administrations occurring from 0715 to 1045 on 25:   Medication Name Total Dose   lidocaine-epinephrine (Xylocaine W/EPI) 1 %-1:100,000 injection 1.5 mL   bupivacaine PF (Marcaine) injection 0.75 % 1.5 mL   sterile water irrigation solution 100 mL   povidone-iodine 5 % ophthalmic solution 1 Application   balanced salts (BSS) intraocular solution 15 mL   ePHEDrine 50 mg/mL 50 mg   fentaNYL PF 0.05 mg/mL 100 mcg   LR infusion Cannot be calculated   lidocaine (Xylocaine) injection 2 % 75 mg   ondansetron 2 mg/mL 4 mg   phenylephrine (Magdaleno-Synephrine) injection 1,080 mcg   propofol (Diprivan) infusion 10 mg/mL 383.79 mg              Anesthesia Record               Intraprocedure I/O Totals          Intake    Propofol Drip 0.00 mL    The total shown is the total volume documented since Anesthesia Start was filed.    LR infusion 700.00 mL    Total Intake 700 mL          Specimen:   ID Type Source Tests Collected by Time   1 : LEFT POSTERIOR ORBIT Tissue EYE BIOPSY LEFT SURGICAL PATHOLOGY EXAM Ike Bradley MD 3/7/2025 0810   2 : LEFT ANTERIOR ORBIT Tissue EYE BIOPSY LEFT SURGICAL PATHOLOGY EXAM Ike Bradley MD 3/7/2025 0870    3 : LEFT POSTERIOR ORBIT FRESH Tissue EYE BIOPSY LEFT SURGICAL PATHOLOGY EXAM Ike Bradley MD 3/7/2025 0844   4 : LEFT ANTERIOR ORBIT     FRESH Tissue EYE BIOPSY LEFT SURGICAL PATHOLOGY EXAM Ike Bradley MD 3/7/2025 0844                 Drains and/or Catheters: * None in log *    Tourniquet Times: None    Implants: None    Findings: Extreme enophthalmous, soft and semi-fibrous nodular orbital lesion    Indications: Donavon Brush is an 84 y.o. male who is having surgery for Orbital mass [H05.89]. He was referred by Dr. 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 He required biopsy to obtain pathologic-analysis to identify the nature of the neoplasm and to allow for appropriate oncologic management. He had a recent lymph node biopsy confirming low grade B cell lymphoma, but due to the unusual presentation of his orbital lesion, biopsy was necessary to determine whether the orbital process also represented lymphoma or another process. Notably, his oncology team required the biopsy before consideration of treatment for orbital lymphoma.     The patient was seen in the preoperative area. The risks, benefits, complications, treatment options, non-operative alternatives, expected recovery and outcomes were discussed with the patient. The possibilities of reaction to medication, pulmonary aspiration, injury to surrounding structures, bleeding, recurrent infection, the need for additional procedures, failure to diagnose a condition, and creating a complication requiring transfusion or operation were discussed with the patient. The patient concurred with the proposed plan, giving informed consent.  The site of surgery was properly noted/marked if necessary per policy. The patient has been actively warmed in preoperative area. Preoperative antibiotics are not indicated.     Procedure Details:   The risks,  benefits, and alternatives of the procedure were discussed in detail with the patient and his spouse, including during his consultation appointment, leading up to the day of surgery, and on the day of surgery. After consent was obtained, the patient was brought to the operative suite and identified as the correct patient. The correct side was marked. Proparacaine drops were placed on both sides. The patient was placed in a supine position. The patient was prepped and draped in the usual sterile fashion. A scleral shell was placed over the eye and removed prior to the end of the case. The lower lid and lateral canthus was infiltrated with 1% lidocaine with epinephrine 1:100,000.     A 4-0 silk suture as passed through the grey line of the lower lid. A lateral canthotomy and inferior cantholysis were performed to allow better exposure of the inferior orbit for an inferior transconjunctival orbitotomy approach.  This allowed for the lower lid to swing medially. The eyelid was everted on a Desmarres retractor and the margin suture placed on traction. Conjunctiva was cauterized 1 mm below the inferior border of the tarsus along the length of the tarsus using [place] tip monopolar cautery. A conjunctival lower lid retractor flap was then elevated superiorly using [place] tip monopolar cautery, and an additional traction suture was placed through the conjunctiva. A preseptal dissection plane was created bluntly towards the inferior orbital rim. Care was taken to stay on the lateral half of the orbit based on the known position of the anterior aspect of the lesion. At this juncture, palpation demonstrated the likely position of the lesion to be just behind the inferior orbital septum, well above the periosteal plane, so the decision was made to approach the lesion form superior to the periosteal plane. The orbital soft tissue and globe were protected with a malleable retractor, and Vamsi scissors were then used to  "incise through the orbital septum. Blunt dissection with cotton-tipped applicators was used to reveal a sufficient amount of the lesion for biopsy. Upon adequate exposure, a large sample of the lesion was biopsied with Vamsi scissors. This specimen was bisected and each half was submitted separately for pathologic analysis in formalin and as a fresh sample (\"left orbital lesion, anterior\"). A second biopsy was taken at a deeper plane to the first, and submitted separately for pathologic analysis in both formalin and as a fresh sample (\"left orbital lesion, posterior\"). At this juncture, hemostasis was achieved with multiple thrombin-soaked neuro paddies, each placed in the orbital space for about 60 seconds in sequence, followed by bipolar cautery. Careful examination of the surgical space revealed complete hemostasis with no evidence of active bleeding. The conjunctival incision was then closed using 6-0 plain gut sutures in an interrupted fashion.    Attention was then directed to the previously created lateral canthotomy and inferior cantholysis to achieve closure with a lateral tarsal strip. A tarsal strip was created using Christopher scissors and trimmed to appropriate length. The mucocutaneous junction of the eyelid margin was excised and the posterior surface of the tarsal strip was scraped with a #15 blade to minimize the risk of inclusion cyst development. Hemostasis was achieved with bipolar cautery. A 5-0 vicryl suture on a P2 needle was passed in a horizontal mattress fashion through the superior portion of the tarsus and then through the inner aspect of the lateral orbital rim periosteum. A second suture was passed in an identical fashion through the inferior portion of the tarsus. The scleral shell was removed. The tarsal sutures were tied allowing for adequate repositioning and restoration of the presurgical lower eyelid position.  A 5-0 vicryl suture on a P2 needle was passed in a buried interrupted " fashion through the orbicularis and superficial temporalis fascia to resuspend the lower lid and mid face. The skin was closed using 5-0 plain gut in an interrupted fashion.     Antibiotic ointment was placed over the eye and the eye was then patched. At the closure of surgery, there was no evidence of active bleeding or excessive swelling. The patient tolerated the procedure well and was taken to the recovery room in stable condition.     Complications:  None; patient tolerated the procedure well.    Disposition: PACU - hemodynamically stable.  Condition: stable     Additional Details: The patient was kept in the PACU for at least 4 hours for observation and confirmation of stability and hemostasis prior to discharge home in the company of his spouse (see postop progress notes).    Ike Bradley  Phone Number: [phone]

## 2025-03-08 NOTE — PROGRESS NOTES
Phoned pt on POD#1 to check in on them following surgery yesterday. Pt reports they are doing well. No significant pain or vision loss. No specific concerns and pt is in overall good spirits.     Will continue with current plans until reassessment at upcoming follow-up visit. Pt advised to call, present to clinic, or present to ED with any acute changes or concerns.

## 2025-03-12 LAB
CELL COUNT (BLOOD): 0.02 X10*3/UL
CELL COUNT (BLOOD): 0.04 X10*3/UL
CELL POPULATIONS: NORMAL
CELL POPULATIONS: NORMAL
DIAGNOSIS: NORMAL
DIAGNOSIS: NORMAL
FLOW DIFFERENTIAL: NORMAL
FLOW DIFFERENTIAL: NORMAL
FLOW TEST ORDERED: NORMAL
FLOW TEST ORDERED: NORMAL
LAB AP ASR DISCLAIMER: NORMAL
LAB TEST METHOD: NORMAL
LAB TEST METHOD: NORMAL
LABORATORY COMMENT REPORT: NORMAL
NUMBER OF CELLS COLLECTED: NORMAL
NUMBER OF CELLS COLLECTED: NORMAL
PATH REPORT.FINAL DX SPEC: NORMAL
PATH REPORT.GROSS SPEC: NORMAL
PATH REPORT.RELEVANT HX SPEC: NORMAL
PATH REPORT.TOTAL CANCER: NORMAL
SIGNATURE COMMENT: NORMAL
SIGNATURE COMMENT: NORMAL
SPECIMEN VIABILITY: NORMAL
SPECIMEN VIABILITY: NORMAL

## 2025-03-13 LAB
LAB AP ASR DISCLAIMER: NORMAL
LABORATORY COMMENT REPORT: NORMAL
PATH REPORT.ADDENDUM SPEC: NORMAL
PATH REPORT.FINAL DX SPEC: NORMAL
PATH REPORT.GROSS SPEC: NORMAL
PATH REPORT.RELEVANT HX SPEC: NORMAL
PATH REPORT.TOTAL CANCER: NORMAL

## 2025-03-21 ENCOUNTER — OFFICE VISIT (OUTPATIENT)
Dept: HEMATOLOGY/ONCOLOGY | Facility: CLINIC | Age: 85
End: 2025-03-21
Payer: MEDICARE

## 2025-03-21 ENCOUNTER — APPOINTMENT (OUTPATIENT)
Dept: OPHTHALMOLOGY | Facility: CLINIC | Age: 85
End: 2025-03-21
Payer: MEDICARE

## 2025-03-21 ENCOUNTER — TELEPHONE (OUTPATIENT)
Dept: OPHTHALMOLOGY | Facility: CLINIC | Age: 85
End: 2025-03-21

## 2025-03-21 VITALS
SYSTOLIC BLOOD PRESSURE: 168 MMHG | DIASTOLIC BLOOD PRESSURE: 79 MMHG | BODY MASS INDEX: 29.44 KG/M2 | HEIGHT: 66 IN | TEMPERATURE: 97.5 F | WEIGHT: 183.2 LBS | RESPIRATION RATE: 18 BRPM | HEART RATE: 60 BPM | OXYGEN SATURATION: 98 %

## 2025-03-21 DIAGNOSIS — C83.00 SMALL LYMPHOCYTIC LYMPHOMA (MULTI): Primary | ICD-10-CM

## 2025-03-21 PROCEDURE — 1126F AMNT PAIN NOTED NONE PRSNT: CPT | Performed by: INTERNAL MEDICINE

## 2025-03-21 PROCEDURE — 1123F ACP DISCUSS/DSCN MKR DOCD: CPT | Performed by: INTERNAL MEDICINE

## 2025-03-21 PROCEDURE — 3077F SYST BP >= 140 MM HG: CPT | Performed by: INTERNAL MEDICINE

## 2025-03-21 PROCEDURE — 99215 OFFICE O/P EST HI 40 MIN: CPT | Performed by: INTERNAL MEDICINE

## 2025-03-21 PROCEDURE — G2211 COMPLEX E/M VISIT ADD ON: HCPCS | Performed by: INTERNAL MEDICINE

## 2025-03-21 PROCEDURE — 3078F DIAST BP <80 MM HG: CPT | Performed by: INTERNAL MEDICINE

## 2025-03-21 RX ORDER — ACETAMINOPHEN 325 MG/1
650 TABLET ORAL ONCE
OUTPATIENT
Start: 2025-04-02

## 2025-03-21 RX ORDER — FAMOTIDINE 10 MG/ML
20 INJECTION, SOLUTION INTRAVENOUS ONCE AS NEEDED
OUTPATIENT
Start: 2025-04-08

## 2025-03-21 RX ORDER — EPINEPHRINE 0.3 MG/.3ML
0.3 INJECTION SUBCUTANEOUS EVERY 5 MIN PRN
OUTPATIENT
Start: 2025-04-02

## 2025-03-21 RX ORDER — DEXAMETHASONE IN 0.9 % SOD CHL 20 MG/50ML
20 INTRAVENOUS SOLUTION, PIGGYBACK (ML) INTRAVENOUS ONCE
OUTPATIENT
Start: 2025-04-15

## 2025-03-21 RX ORDER — DIPHENHYDRAMINE HCL 25 MG
50 CAPSULE ORAL ONCE
OUTPATIENT
Start: 2025-04-08

## 2025-03-21 RX ORDER — EPINEPHRINE 0.3 MG/.3ML
0.3 INJECTION SUBCUTANEOUS EVERY 5 MIN PRN
OUTPATIENT
Start: 2025-04-01

## 2025-03-21 RX ORDER — PROCHLORPERAZINE EDISYLATE 5 MG/ML
10 INJECTION INTRAMUSCULAR; INTRAVENOUS EVERY 6 HOURS PRN
OUTPATIENT
Start: 2025-04-01

## 2025-03-21 RX ORDER — EPINEPHRINE 0.3 MG/.3ML
0.3 INJECTION SUBCUTANEOUS EVERY 5 MIN PRN
OUTPATIENT
Start: 2025-04-08

## 2025-03-21 RX ORDER — DIPHENHYDRAMINE HYDROCHLORIDE 50 MG/ML
50 INJECTION, SOLUTION INTRAMUSCULAR; INTRAVENOUS AS NEEDED
OUTPATIENT
Start: 2025-04-02

## 2025-03-21 RX ORDER — PROCHLORPERAZINE EDISYLATE 5 MG/ML
10 INJECTION INTRAMUSCULAR; INTRAVENOUS EVERY 6 HOURS PRN
OUTPATIENT
Start: 2025-04-02

## 2025-03-21 RX ORDER — DIPHENHYDRAMINE HCL 25 MG
50 CAPSULE ORAL ONCE
OUTPATIENT
Start: 2025-04-15

## 2025-03-21 RX ORDER — ALBUTEROL SULFATE 0.83 MG/ML
3 SOLUTION RESPIRATORY (INHALATION) AS NEEDED
OUTPATIENT
Start: 2025-04-02

## 2025-03-21 RX ORDER — DIPHENHYDRAMINE HCL 25 MG
50 CAPSULE ORAL ONCE
OUTPATIENT
Start: 2025-04-01

## 2025-03-21 RX ORDER — ALBUTEROL SULFATE 0.83 MG/ML
3 SOLUTION RESPIRATORY (INHALATION) AS NEEDED
OUTPATIENT
Start: 2025-04-15

## 2025-03-21 RX ORDER — PROCHLORPERAZINE EDISYLATE 5 MG/ML
10 INJECTION INTRAMUSCULAR; INTRAVENOUS EVERY 6 HOURS PRN
OUTPATIENT
Start: 2025-04-15

## 2025-03-21 RX ORDER — ACETAMINOPHEN 325 MG/1
650 TABLET ORAL ONCE
OUTPATIENT
Start: 2025-04-01

## 2025-03-21 RX ORDER — ACETAMINOPHEN 325 MG/1
650 TABLET ORAL ONCE
OUTPATIENT
Start: 2025-04-08

## 2025-03-21 RX ORDER — DEXAMETHASONE IN 0.9 % SOD CHL 20 MG/50ML
20 INTRAVENOUS SOLUTION, PIGGYBACK (ML) INTRAVENOUS ONCE
OUTPATIENT
Start: 2025-04-08

## 2025-03-21 RX ORDER — DIPHENHYDRAMINE HYDROCHLORIDE 50 MG/ML
50 INJECTION, SOLUTION INTRAMUSCULAR; INTRAVENOUS AS NEEDED
OUTPATIENT
Start: 2025-04-15

## 2025-03-21 RX ORDER — FAMOTIDINE 10 MG/ML
20 INJECTION, SOLUTION INTRAVENOUS ONCE AS NEEDED
OUTPATIENT
Start: 2025-04-01

## 2025-03-21 RX ORDER — FAMOTIDINE 10 MG/ML
20 INJECTION, SOLUTION INTRAVENOUS ONCE AS NEEDED
OUTPATIENT
Start: 2025-04-15

## 2025-03-21 RX ORDER — PROCHLORPERAZINE MALEATE 10 MG
10 TABLET ORAL EVERY 6 HOURS PRN
OUTPATIENT
Start: 2025-04-15

## 2025-03-21 RX ORDER — ALBUTEROL SULFATE 0.83 MG/ML
3 SOLUTION RESPIRATORY (INHALATION) AS NEEDED
OUTPATIENT
Start: 2025-04-01

## 2025-03-21 RX ORDER — DEXAMETHASONE IN 0.9 % SOD CHL 20 MG/50ML
20 INTRAVENOUS SOLUTION, PIGGYBACK (ML) INTRAVENOUS ONCE
OUTPATIENT
Start: 2025-04-02

## 2025-03-21 RX ORDER — PROCHLORPERAZINE MALEATE 10 MG
10 TABLET ORAL EVERY 6 HOURS PRN
OUTPATIENT
Start: 2025-04-08

## 2025-03-21 RX ORDER — ONDANSETRON HYDROCHLORIDE 8 MG/1
8 TABLET, FILM COATED ORAL EVERY 8 HOURS PRN
Qty: 30 TABLET | Refills: 5 | Status: SHIPPED | OUTPATIENT
Start: 2025-03-21

## 2025-03-21 RX ORDER — DIPHENHYDRAMINE HYDROCHLORIDE 50 MG/ML
50 INJECTION, SOLUTION INTRAMUSCULAR; INTRAVENOUS AS NEEDED
OUTPATIENT
Start: 2025-04-08

## 2025-03-21 RX ORDER — PROCHLORPERAZINE EDISYLATE 5 MG/ML
10 INJECTION INTRAMUSCULAR; INTRAVENOUS EVERY 6 HOURS PRN
OUTPATIENT
Start: 2025-04-08

## 2025-03-21 RX ORDER — PROCHLORPERAZINE MALEATE 10 MG
10 TABLET ORAL EVERY 6 HOURS PRN
OUTPATIENT
Start: 2025-04-01

## 2025-03-21 RX ORDER — ACETAMINOPHEN 325 MG/1
650 TABLET ORAL ONCE
OUTPATIENT
Start: 2025-04-15

## 2025-03-21 RX ORDER — PROCHLORPERAZINE MALEATE 10 MG
10 TABLET ORAL EVERY 6 HOURS PRN
OUTPATIENT
Start: 2025-04-02

## 2025-03-21 RX ORDER — DIPHENHYDRAMINE HCL 25 MG
50 CAPSULE ORAL ONCE
OUTPATIENT
Start: 2025-04-02

## 2025-03-21 RX ORDER — DIPHENHYDRAMINE HYDROCHLORIDE 50 MG/ML
50 INJECTION, SOLUTION INTRAMUSCULAR; INTRAVENOUS AS NEEDED
OUTPATIENT
Start: 2025-04-01

## 2025-03-21 RX ORDER — ALBUTEROL SULFATE 0.83 MG/ML
3 SOLUTION RESPIRATORY (INHALATION) AS NEEDED
OUTPATIENT
Start: 2025-04-08

## 2025-03-21 RX ORDER — ALLOPURINOL 300 MG/1
300 TABLET ORAL DAILY
Qty: 30 TABLET | Refills: 1 | Status: SHIPPED | OUTPATIENT
Start: 2025-03-21

## 2025-03-21 RX ORDER — ACYCLOVIR 400 MG/1
400 TABLET ORAL 2 TIMES DAILY
Qty: 60 TABLET | Refills: 5 | Status: SHIPPED | OUTPATIENT
Start: 2025-03-21

## 2025-03-21 RX ORDER — FAMOTIDINE 10 MG/ML
20 INJECTION, SOLUTION INTRAVENOUS ONCE AS NEEDED
OUTPATIENT
Start: 2025-04-02

## 2025-03-21 RX ORDER — DEXAMETHASONE IN 0.9 % SOD CHL 20 MG/50ML
20 INTRAVENOUS SOLUTION, PIGGYBACK (ML) INTRAVENOUS ONCE
OUTPATIENT
Start: 2025-04-01

## 2025-03-21 RX ORDER — EPINEPHRINE 0.3 MG/.3ML
0.3 INJECTION SUBCUTANEOUS EVERY 5 MIN PRN
OUTPATIENT
Start: 2025-04-15

## 2025-03-21 ASSESSMENT — ENCOUNTER SYMPTOMS
GASTROINTESTINAL NEGATIVE: 1
FATIGUE: 1
FEVER: 0
CARDIOVASCULAR NEGATIVE: 1
RESPIRATORY NEGATIVE: 1

## 2025-03-21 ASSESSMENT — PAIN SCALES - GENERAL: PAINLEVEL_OUTOF10: 0-NO PAIN

## 2025-03-21 NOTE — PROGRESS NOTES
Pt seen in office today for a follow up visit with Dr. Kael Chris for management of his newly diagnosed B cell lymphoma.  He is  without complaints today and denies pain.     Medications, pharmacy preference and allergies were reviewed with patient and updated in the medical record by MD.     Per orders, a biopsy was completed on 3/7/25 and results are to be reviewed in office today by MD with patient.An in person chemo class has been done with patient and wife and his sister-in -law who is a chemo certified RN. Consent has been obtained for Venetoclax and Obinutuzumab. Black binder and red chemo bag, PI sheets on medications, scheduling, labs and FUVs have all been reviewed in detail and all questions have been answered to all's satisfaction. Patient is to receive all lab work and treatments in [place], to start on 4/1/25, per his request.    Our contact information was given to patient and they were encouraged to contact us with any questions or concerns.    Patient verbalized understanding and agreement regarding discussed information via verbal feedback. Pt escorted to scheduling.

## 2025-03-21 NOTE — PROGRESS NOTES
3/20/25 - [place] notified Care Coordinate that patient states that they have an oncology appointment and that they might be late.   instructed to reach out to other patients for today to see if they would be willing to swap.   did not stating they will leave them there.  The patient was called after hours to ask if the  spoke with them again and they said no.  The patient's wife states if there's even a need for an appointment because if something bad would have happened it would've happened by now.  After speaking with the patient for sometime regarding time and logistics both parties agreed to reschedule to the soonest available date.  Patient instructed to call central scheduling as the  has now gone on PTO at end of day.      1118 - This morning the patient's chart was reviewed and they have canceled entirely.  Spoke with patient, wife, and the patient's sister-in-law.  Patient tried to reschedule but were given May 20th and denied a fuv due to it being to far from the surgical date.  Spoke to the family regarding being contacted on three dates warning them of the appointments by  systems.  Wife states this was brought up with the  with Dr. Chris and the  said everything would work.  Patient states they would need to drive recklessly to make these appointments on time.  Patient's wife was directed to contact scheduling for soonest date and to add themselves to the cancellation list.  [facility] cell number was given again to patient and wife to keep an active connection for further coordination.

## 2025-03-21 NOTE — PROGRESS NOTES
"Patient ID: Donavon Brush is a 84 y.o. male.    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   Has had pressure in his L eye for over 2 years now. Had biopsy recently from the eye and R supraclavicular LAP.   Here to fu on the results.     Assessment/Plan:  ? 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 Ki67 was 10%. PET reveals uptake in multiple LN, lungs, and possibly a kidney lesion. He requires treatment    He had had open heart surgery therefore btk inhibitors are not good option for him. I talked to him about venetoclax + obinutuzumab. He is in agreement with the treatment. We discussed risks, benefits, and alternatives in detail.     Will start obinutuzumab next week. He will start venetoclax with cycle 2. He lives in [place] and will get obitnutuzumab in [place]. Will plan on repeating PET/CT after C3. Pt concurs.     Review Of Systems:  Review of Systems   Constitutional:  Positive for fatigue. Negative for fever.   HENT:  Negative.     Respiratory: Negative.     Cardiovascular: Negative.    Gastrointestinal: Negative.    Genitourinary: Negative.         Physical Exam:  /79 (BP Location: Right arm, Patient Position: Sitting, BP Cuff Size: Adult long)   Pulse 60   Temp 36.4 °C (97.5 °F) (Temporal)   Resp 18   Ht (S) 1.686 m (5' 6.38\")   Wt 83.1 kg (183 lb 3.2 oz)   SpO2 98%   BMI 29.23 kg/m²   BSA: 1.97 meters squared  Performance Status: Asymptomatic  Physical Exam  HENT:      Head: Normocephalic and atraumatic.   Eyes:      General: No scleral icterus.  Pulmonary:      Effort: Pulmonary effort is normal.   Musculoskeletal:         General: Normal range of motion.   Skin:     Coloration: Skin is not jaundiced.   Neurological:      General: No focal deficit present.      Mental Status: He is alert and oriented to person, place, and time.         Results:  Diagnostic Results   Lab Results   Component Value Date    WBC 9.7 " 01/16/2025    HGB 13.9 01/16/2025    HCT 43.4 01/16/2025    MCV 90 01/16/2025     (L) 01/16/2025     Lab Results   Component Value Date    CALCIUM 9.5 01/16/2025     01/16/2025    K 4.6 01/16/2025    CO2 30 01/16/2025     01/16/2025    BUN 27 (H) 01/16/2025    CREATININE 1.40 (H) 01/16/2025    ALT 9 (L) 01/16/2025    AST 13 01/16/2025       Current Outpatient Medications:     acyclovir (Zovirax) 400 mg tablet, Take 1 tablet (400 mg) by mouth 2 times a day., Disp: 60 tablet, Rfl: 5    allopurinol (Zyloprim) 300 mg tablet, Take 1 tablet (300 mg) by mouth once daily., Disp: 30 tablet, Rfl: 1    carvedilol (Coreg) 25 mg tablet, Take 1 tablet (25 mg) by mouth 2 times daily (morning and late afternoon)., Disp: 180 tablet, Rfl: 3    erythromycin (Romycin) 5 mg/gram (0.5 %) ophthalmic ointment, Apply to left eye every 6 hours. APPLY 1/2 INCH RIBBON IN THE LEFT EYE UP TO THREE TIMES DAILY, Disp: 3.5 g, Rfl: 1    levothyroxine (Euthyrox) 75 mcg tablet, TAKE ONE TABLET BY MOUTH once DAILY as directed **need lab work**, Disp: 90 tablet, Rfl: 3    neomycin-bacitracin-polymyxin-HC (Cortisporin) 3.5-400-10,000 mg-unit/g-1% ophthalmic ointment, Apply to left eye twice a day for 2 weeks, Disp: 3.5 g, Rfl: 0    ondansetron (Zofran) 8 mg tablet, Take 1 tablet (8 mg) by mouth every 8 hours if needed for nausea or vomiting., Disp: 30 tablet, Rfl: 5    sacubitriL-valsartan (Entresto) 24-26 mg tablet, Take 1 tablet by mouth 2 times a day., Disp: 60 tablet, Rfl: 5    [START ON 4/1/2025] venetoclax (Venclexta) 10 mg-50 mg- 100 mg tablet therapy pack, Take as directed with food., Disp: 1 each, Rfl: 0    warfarin (Coumadin) 7.5 mg tablet, Take 1 tablet (7.5 mg) by mouth once daily at bedtime., Disp: 90 tablet, Rfl: 3     Past Surgical History:   Procedure Laterality Date    AORTIC VALVE REPLACEMENT  2003    CARDIAC PACEMAKER PLACEMENT  09/22/2014    Pacemaker Placement    CATARACT EXTRACTION Bilateral     FRACTURE SURGERY       shoulder fracture    KNEE ARTHROPLASTY Left     PROSTATE SURGERY      TURP     Family History   Problem Relation Name Age of Onset    Alzheimer's disease Mother          passed at 84    No Known Problems Father          passed at 66    Heart disease Brother Enrrique     No Known Problems Daughter      No Known Problems Son      No Known Problems Son        reports that he quit smoking about 25 years ago. His smoking use included cigarettes. He has never used smokeless tobacco.    Diagnoses and all orders for this visit:  Small lymphocytic lymphoma (Multi)  -     ondansetron (Zofran) 8 mg tablet; Take 1 tablet (8 mg) by mouth every 8 hours if needed for nausea or vomiting.  -     allopurinol (Zyloprim) 300 mg tablet; Take 1 tablet (300 mg) by mouth once daily.  -     acyclovir (Zovirax) 400 mg tablet; Take 1 tablet (400 mg) by mouth 2 times a day.  -     Clinic Appointment Request; Future  -     Infusion Appointment Request; Future  -     CBC and Auto Differential; Future  -     Comprehensive metabolic panel; Future  -     Hepatitis B surface antigen; Future  -     Hepatitis B Core Antibody, Total; Future  -     Hepatitis B surface antibody; Future  -     Infusion Appointment Request; Future  -     Infusion Appointment Request; Future  -     CBC and Auto Differential; Future  -     Comprehensive metabolic panel; Future  -     Infusion Appointment Request; Future  -     CBC and Auto Differential; Future  -     Comprehensive metabolic panel; Future  -     CBC and Auto Differential; Future  -     Comprehensive metabolic panel; Future  -     Lactate dehydrogenase; Future  -     Magnesium; Future  -     Phosphorus; Future  -     Uric Acid; Future  -     CBC and Auto Differential; Future  -     Comprehensive metabolic panel; Future  -     Lactate dehydrogenase; Future  -     Magnesium; Future  -     Phosphorus; Future  -     Uric Acid; Future  -     venetoclax (Venclexta) 10 mg-50 mg- 100 mg tablet therapy pack; Take as  directed with food.  Other orders  -     acetaminophen (Tylenol) tablet 650 mg  -     diphenhydrAMINE (BENADryl) capsule 50 mg  -     dexAMETHasone (Decadron) 20 mg in dextrose 5% 50 mL IV  -     prochlorperazine (Compazine) tablet 10 mg  -     prochlorperazine (Compazine) injection 10 mg  -     obinutuzumab (Gazyva) 100 mg in sodium chloride 0.9% 104 mL IV  -     sodium chloride 0.9 % bolus 500 mL  -     dextrose 5 % in water (D5W) bolus 500 mL  -     diphenhydrAMINE (BENADryl) injection 50 mg  -     methylPREDNISolone sod succinate (SOLU-Medrol) 40 mg/mL injection 40 mg  -     famotidine PF (Pepcid) injection 20 mg  -     EPINEPHrine (Epipen) injection syringe 0.3 mg  -     albuterol 2.5 mg /3 mL (0.083 %) nebulizer solution 3 mL  -     acetaminophen (Tylenol) tablet 650 mg  -     diphenhydrAMINE (BENADryl) capsule 50 mg  -     dexAMETHasone (Decadron) 20 mg in dextrose 5% 50 mL IV  -     prochlorperazine (Compazine) tablet 10 mg  -     prochlorperazine (Compazine) injection 10 mg  -     obinutuzumab (Gazyva) 900 mg in sodium chloride 0.9% 286 mL IV  -     sodium chloride 0.9 % bolus 500 mL  -     dextrose 5 % in water (D5W) bolus 500 mL  -     diphenhydrAMINE (BENADryl) injection 50 mg  -     methylPREDNISolone sod succinate (SOLU-Medrol) 40 mg/mL injection 40 mg  -     famotidine PF (Pepcid) injection 20 mg  -     EPINEPHrine (Epipen) injection syringe 0.3 mg  -     albuterol 2.5 mg /3 mL (0.083 %) nebulizer solution 3 mL  -     acetaminophen (Tylenol) tablet 650 mg  -     diphenhydrAMINE (BENADryl) capsule 50 mg  -     dexAMETHasone (Decadron) 20 mg in dextrose 5% 50 mL IV  -     prochlorperazine (Compazine) tablet 10 mg  -     prochlorperazine (Compazine) injection 10 mg  -     obinutuzumab (Gazyva) 1,000 mg in sodium chloride 0.9% 290 mL IV  -     sodium chloride 0.9 % bolus 500 mL  -     dextrose 5 % in water (D5W) bolus 500 mL  -     diphenhydrAMINE (BENADryl) injection 50 mg  -     methylPREDNISolone sod  succinate (SOLU-Medrol) 40 mg/mL injection 40 mg  -     famotidine PF (Pepcid) injection 20 mg  -     EPINEPHrine (Epipen) injection syringe 0.3 mg  -     albuterol 2.5 mg /3 mL (0.083 %) nebulizer solution 3 mL  -     acetaminophen (Tylenol) tablet 650 mg  -     diphenhydrAMINE (BENADryl) capsule 50 mg  -     dexAMETHasone (Decadron) 20 mg in dextrose 5% 50 mL IV  -     prochlorperazine (Compazine) tablet 10 mg  -     prochlorperazine (Compazine) injection 10 mg  -     obinutuzumab (Gazyva) 1,000 mg in sodium chloride 0.9% 290 mL IV  -     sodium chloride 0.9 % bolus 500 mL  -     dextrose 5 % in water (D5W) bolus 500 mL  -     diphenhydrAMINE (BENADryl) injection 50 mg  -     methylPREDNISolone sod succinate (SOLU-Medrol) 40 mg/mL injection 40 mg  -     famotidine PF (Pepcid) injection 20 mg  -     EPINEPHrine (Epipen) injection syringe 0.3 mg  -     albuterol 2.5 mg /3 mL (0.083 %) nebulizer solution 3 mL       Kael Chris MD

## 2025-03-24 ENCOUNTER — SPECIALTY PHARMACY (OUTPATIENT)
Dept: PHARMACY | Facility: CLINIC | Age: 85
End: 2025-03-24

## 2025-03-24 DIAGNOSIS — I48.0 PAROXYSMAL ATRIAL FIBRILLATION (MULTI): ICD-10-CM

## 2025-03-24 DIAGNOSIS — Z95.0 PRESENCE OF CARDIAC PACEMAKER: Primary | ICD-10-CM

## 2025-03-24 NOTE — TELEPHONE ENCOUNTER
Spoke with patient's wife about a spot on Wednesday to see the doctor for a post op visit.  Patient's wife states they are ok with this and will be coming in.

## 2025-03-25 ENCOUNTER — HOSPITAL ENCOUNTER (OUTPATIENT)
Dept: CARDIOLOGY | Facility: CLINIC | Age: 85
Discharge: HOME | End: 2025-03-25
Payer: MEDICARE

## 2025-03-25 DIAGNOSIS — I48.0 PAROXYSMAL ATRIAL FIBRILLATION (MULTI): ICD-10-CM

## 2025-03-25 DIAGNOSIS — Z95.0 PRESENCE OF CARDIAC PACEMAKER: ICD-10-CM

## 2025-03-25 PROCEDURE — 93280 PM DEVICE PROGR EVAL DUAL: CPT | Performed by: INTERNAL MEDICINE

## 2025-03-25 PROCEDURE — 93280 PM DEVICE PROGR EVAL DUAL: CPT

## 2025-03-26 ENCOUNTER — OFFICE VISIT (OUTPATIENT)
Dept: OPHTHALMOLOGY | Facility: CLINIC | Age: 85
End: 2025-03-26
Payer: MEDICARE

## 2025-03-26 DIAGNOSIS — H05.89 ORBITAL MASS: ICD-10-CM

## 2025-03-26 DIAGNOSIS — H05.422 ENOPHTHALMOS OF LEFT EYE DUE TO TRAUMA OR SURGERY: ICD-10-CM

## 2025-03-26 DIAGNOSIS — D31.60 BENIGN NEOPLASM OF ORBIT, UNSPECIFIED LATERALITY: Primary | ICD-10-CM

## 2025-03-26 PROCEDURE — 99024 POSTOP FOLLOW-UP VISIT: CPT

## 2025-03-26 ASSESSMENT — TONOMETRY
OD_IOP_MMHG: 18
IOP_METHOD: TONOPEN
OS_IOP_MMHG: 13

## 2025-03-26 ASSESSMENT — CUP TO DISC RATIO
OS_RATIO: 0.4
OD_RATIO: 0.4

## 2025-03-26 ASSESSMENT — EXTERNAL EXAM - RIGHT EYE: OD_EXAM: NORMAL

## 2025-03-26 ASSESSMENT — VISUAL ACUITY
METHOD: SNELLEN - LINEAR
OD_CC: 20/20
CORRECTION_TYPE: GLASSES
OD_CC+: -2
OS_CC: 20/30

## 2025-03-26 NOTE — PROGRESS NOTES
84yoM initially referred for LEFT enophthalmos and concern for orbital lesion. Pt now s/p left inferior orbitotomy on 3/07/25. Here for post op follow up. Doing well. Healing really well. Good visual acuity and no RAPD.    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      Follow up in 3 months      Pt referred by Dr. Chris from oncology for consideration of biopsy of LEFT orbital lesion. Concern for metastatic disease of unknown primary site with primary purpose of biopsy being diagnostic.  Pt reports that his only symptom is occasional pain in the left eye which has been treated intermittent with erythromycin lena BID.   He denies any vision changes, vision loss, double vision, or pain with eye movement.  Pt has considerable enophthalmos of the left eye clinically, but he does not seem to be aware of this or when it started. He reports that the intermittent pain he is describing began sometime after his cataract surgery in 2021.    Pertinent Exam and Testing Findings  Significant enophthalmos OS ([place] 98 mm; 15 mm OD; 7 mm OS)  VA 20/20 OD, 20/30 OS; PERRL without APD  EOM slightly reduced OD (50% adduction, otherwise full)  OCT Optic Nerve  No evidence of compressive optic atrophy; RNFL full OU  MRI TOMASA WWO (12/19/24) and CT O WO (9/28/24)  On personal review of imaging, there is an enhancing nondiscrete lesion in the inferolateral left orbit at the level of the equator, extending posteriorly and encasing the optic nerve. Considerable diffuse loss of left orbital fat (intraconal > extraconal) and enophthalmos.    Assessment/Plan  Left orbital lesion/neoplasm with enophthalmos; nondiscrete and encasing the optic nerve  Sclerosing enophthalmos is classically associated with breast cancer, though prior workup and history not compatible with this.   I spoke over the phone with Dr. Chris the goal is to get a biopsy of this lesion and of the lymphadenopathy noted on the recent PET/CT  before systemic therapy/radiotherapy may be considered.   Pt will be undergoing LN biopsy with either general surgery or interventional radiology  I have been asked to do a biopsy of the left orbital lesion, which will likely be most easily accessible via inferior orbitotomy. I explained to the patient and his spouse, and to Dr. Chris, that complete excision is not possible and that we should aim for subtotal excision primarily for diagnostic purposes. While there is no evidence of optic neuropathy and the vision and globe are in good shape presently, an orbitotomy and biopsy presents significant risks of infection, bleeding, loss of vision (possibly complete loss), double vision, cosmetic deformity, and need for additional surgery. All parties understand and would like to proceed with organizing the biopsy.  Of note, my current block time is only at [facility] and the earliest date I have available is 2/17/25. The patient is likely not a great candidate for performing this at a surgical center as he is on coumadin and has a pacemaker. Will need to do the biopsy at [facility] and will require PAT and pre-op assessments for his medical status and suitable for safe surgery. I am actively looking into this with our staff to coordinate biopsy as soon as possible at [facility]. I updated Dr. Chris on all of these matters. Pt will be contacted with further instructions.     Ike Bradley MD

## 2025-03-31 ENCOUNTER — APPOINTMENT (OUTPATIENT)
Dept: LAB | Facility: HOSPITAL | Age: 85
End: 2025-03-31
Payer: MEDICARE

## 2025-03-31 ENCOUNTER — LAB (OUTPATIENT)
Dept: LAB | Facility: HOSPITAL | Age: 85
End: 2025-03-31
Payer: MEDICARE

## 2025-03-31 DIAGNOSIS — C83.00 SMALL LYMPHOCYTIC LYMPHOMA (MULTI): ICD-10-CM

## 2025-03-31 LAB
ALBUMIN SERPL BCP-MCNC: 4 G/DL (ref 3.4–5)
ALP SERPL-CCNC: 54 U/L (ref 33–136)
ALT SERPL W P-5'-P-CCNC: 10 U/L (ref 10–52)
ANION GAP SERPL CALC-SCNC: 9 MMOL/L (ref 10–20)
AST SERPL W P-5'-P-CCNC: 13 U/L (ref 9–39)
BASOPHILS # BLD AUTO: 0.06 X10*3/UL (ref 0–0.1)
BASOPHILS NFR BLD AUTO: 0.5 %
BILIRUB SERPL-MCNC: 0.8 MG/DL (ref 0–1.2)
BUN SERPL-MCNC: 26 MG/DL (ref 6–23)
CALCIUM SERPL-MCNC: 9.4 MG/DL (ref 8.6–10.3)
CHLORIDE SERPL-SCNC: 105 MMOL/L (ref 98–107)
CO2 SERPL-SCNC: 31 MMOL/L (ref 21–32)
CREAT SERPL-MCNC: 1.68 MG/DL (ref 0.5–1.3)
EGFRCR SERPLBLD CKD-EPI 2021: 40 ML/MIN/1.73M*2
EOSINOPHIL # BLD AUTO: 0.5 X10*3/UL (ref 0–0.4)
EOSINOPHIL NFR BLD AUTO: 4.5 %
ERYTHROCYTE [DISTWIDTH] IN BLOOD BY AUTOMATED COUNT: 14.4 % (ref 11.5–14.5)
GLUCOSE SERPL-MCNC: 109 MG/DL (ref 74–99)
HBV CORE AB SER QL: NONREACTIVE
HBV SURFACE AB SER-ACNC: <3.1 MIU/ML
HBV SURFACE AG SERPL QL IA: NONREACTIVE
HCT VFR BLD AUTO: 42.6 % (ref 41–52)
HGB BLD-MCNC: 13.8 G/DL (ref 13.5–17.5)
IMM GRANULOCYTES # BLD AUTO: 0.03 X10*3/UL (ref 0–0.5)
IMM GRANULOCYTES NFR BLD AUTO: 0.3 % (ref 0–0.9)
LYMPHOCYTES # BLD AUTO: 4.87 X10*3/UL (ref 0.8–3)
LYMPHOCYTES NFR BLD AUTO: 44.1 %
MCH RBC QN AUTO: 29.1 PG (ref 26–34)
MCHC RBC AUTO-ENTMCNC: 32.4 G/DL (ref 32–36)
MCV RBC AUTO: 90 FL (ref 80–100)
MONOCYTES # BLD AUTO: 0.79 X10*3/UL (ref 0.05–0.8)
MONOCYTES NFR BLD AUTO: 7.1 %
NEUTROPHILS # BLD AUTO: 4.8 X10*3/UL (ref 1.6–5.5)
NEUTROPHILS NFR BLD AUTO: 43.5 %
NRBC BLD-RTO: 0 /100 WBCS (ref 0–0)
PLATELET # BLD AUTO: 149 X10*3/UL (ref 150–450)
POTASSIUM SERPL-SCNC: 4.5 MMOL/L (ref 3.5–5.3)
PROT SERPL-MCNC: 6.6 G/DL (ref 6.4–8.2)
RBC # BLD AUTO: 4.75 X10*6/UL (ref 4.5–5.9)
SODIUM SERPL-SCNC: 140 MMOL/L (ref 136–145)
WBC # BLD AUTO: 11.1 X10*3/UL (ref 4.4–11.3)

## 2025-03-31 PROCEDURE — 87340 HEPATITIS B SURFACE AG IA: CPT

## 2025-03-31 PROCEDURE — 85025 COMPLETE CBC W/AUTO DIFF WBC: CPT

## 2025-03-31 PROCEDURE — 86704 HEP B CORE ANTIBODY TOTAL: CPT

## 2025-03-31 PROCEDURE — 86706 HEP B SURFACE ANTIBODY: CPT

## 2025-03-31 PROCEDURE — 80053 COMPREHEN METABOLIC PANEL: CPT

## 2025-04-01 ENCOUNTER — INFUSION (OUTPATIENT)
Dept: HEMATOLOGY/ONCOLOGY | Facility: HOSPITAL | Age: 85
End: 2025-04-01
Payer: MEDICARE

## 2025-04-01 VITALS
RESPIRATION RATE: 17 BRPM | SYSTOLIC BLOOD PRESSURE: 126 MMHG | BODY MASS INDEX: 29.24 KG/M2 | HEART RATE: 60 BPM | DIASTOLIC BLOOD PRESSURE: 66 MMHG | TEMPERATURE: 96.8 F | OXYGEN SATURATION: 96 % | WEIGHT: 186.29 LBS | HEIGHT: 67 IN

## 2025-04-01 DIAGNOSIS — C83.00 SMALL LYMPHOCYTIC LYMPHOMA (MULTI): ICD-10-CM

## 2025-04-01 PROCEDURE — 2500000001 HC RX 250 WO HCPCS SELF ADMINISTERED DRUGS (ALT 637 FOR MEDICARE OP): Performed by: INTERNAL MEDICINE

## 2025-04-01 PROCEDURE — 2500000004 HC RX 250 GENERAL PHARMACY W/ HCPCS (ALT 636 FOR OP/ED): Performed by: INTERNAL MEDICINE

## 2025-04-01 PROCEDURE — 96375 TX/PRO/DX INJ NEW DRUG ADDON: CPT | Mod: INF

## 2025-04-01 PROCEDURE — 96415 CHEMO IV INFUSION ADDL HR: CPT

## 2025-04-01 PROCEDURE — 96413 CHEMO IV INFUSION 1 HR: CPT

## 2025-04-01 RX ORDER — DIPHENHYDRAMINE HYDROCHLORIDE 50 MG/ML
50 INJECTION, SOLUTION INTRAMUSCULAR; INTRAVENOUS AS NEEDED
Status: DISCONTINUED | OUTPATIENT
Start: 2025-04-01 | End: 2025-04-01 | Stop reason: HOSPADM

## 2025-04-01 RX ORDER — EPINEPHRINE 0.3 MG/.3ML
0.3 INJECTION SUBCUTANEOUS EVERY 5 MIN PRN
Status: DISCONTINUED | OUTPATIENT
Start: 2025-04-01 | End: 2025-04-01 | Stop reason: HOSPADM

## 2025-04-01 RX ORDER — PROCHLORPERAZINE MALEATE 10 MG
10 TABLET ORAL EVERY 6 HOURS PRN
Status: DISCONTINUED | OUTPATIENT
Start: 2025-04-01 | End: 2025-04-01 | Stop reason: HOSPADM

## 2025-04-01 RX ORDER — ALBUTEROL SULFATE 0.83 MG/ML
3 SOLUTION RESPIRATORY (INHALATION) AS NEEDED
Status: DISCONTINUED | OUTPATIENT
Start: 2025-04-01 | End: 2025-04-01 | Stop reason: HOSPADM

## 2025-04-01 RX ORDER — MEPERIDINE HYDROCHLORIDE 50 MG/ML
25 INJECTION INTRAMUSCULAR; INTRAVENOUS; SUBCUTANEOUS ONCE AS NEEDED
Status: DISCONTINUED | OUTPATIENT
Start: 2025-04-01 | End: 2025-04-01 | Stop reason: HOSPADM

## 2025-04-01 RX ORDER — DIPHENHYDRAMINE HCL 25 MG
50 CAPSULE ORAL ONCE
Status: COMPLETED | OUTPATIENT
Start: 2025-04-01 | End: 2025-04-01

## 2025-04-01 RX ORDER — ACETAMINOPHEN 325 MG/1
650 TABLET ORAL ONCE
Status: COMPLETED | OUTPATIENT
Start: 2025-04-01 | End: 2025-04-01

## 2025-04-01 RX ORDER — FAMOTIDINE 10 MG/ML
20 INJECTION, SOLUTION INTRAVENOUS ONCE AS NEEDED
Status: DISCONTINUED | OUTPATIENT
Start: 2025-04-01 | End: 2025-04-01 | Stop reason: HOSPADM

## 2025-04-01 RX ORDER — PROCHLORPERAZINE EDISYLATE 5 MG/ML
10 INJECTION INTRAMUSCULAR; INTRAVENOUS EVERY 6 HOURS PRN
Status: DISCONTINUED | OUTPATIENT
Start: 2025-04-01 | End: 2025-04-01 | Stop reason: HOSPADM

## 2025-04-01 RX ADMIN — OBINUTUZUMAB 100 MG: 1000 INJECTION, SOLUTION, CONCENTRATE INTRAVENOUS at 09:42

## 2025-04-01 RX ADMIN — ACETAMINOPHEN 650 MG: 325 TABLET, FILM COATED ORAL at 09:11

## 2025-04-01 RX ADMIN — DIPHENHYDRAMINE HYDROCHLORIDE 50 MG: 25 CAPSULE ORAL at 09:11

## 2025-04-01 RX ADMIN — DEXAMETHASONE SODIUM PHOSPHATE 20 MG: 10 INJECTION INTRAMUSCULAR; INTRAVENOUS at 09:17

## 2025-04-01 ASSESSMENT — COLUMBIA-SUICIDE SEVERITY RATING SCALE - C-SSRS
2. HAVE YOU ACTUALLY HAD ANY THOUGHTS OF KILLING YOURSELF?: NO
1. IN THE PAST MONTH, HAVE YOU WISHED YOU WERE DEAD OR WISHED YOU COULD GO TO SLEEP AND NOT WAKE UP?: NO
6. HAVE YOU EVER DONE ANYTHING, STARTED TO DO ANYTHING, OR PREPARED TO DO ANYTHING TO END YOUR LIFE?: NO

## 2025-04-01 ASSESSMENT — PATIENT HEALTH QUESTIONNAIRE - PHQ9
SUM OF ALL RESPONSES TO PHQ9 QUESTIONS 1 & 2: 0
2. FEELING DOWN, DEPRESSED OR HOPELESS: NOT AT ALL
1. LITTLE INTEREST OR PLEASURE IN DOING THINGS: NOT AT ALL

## 2025-04-01 ASSESSMENT — ENCOUNTER SYMPTOMS
LOSS OF SENSATION IN FEET: 0
DEPRESSION: 0
OCCASIONAL FEELINGS OF UNSTEADINESS: 0

## 2025-04-01 ASSESSMENT — LIFESTYLE VARIABLES
HOW OFTEN DO YOU HAVE SIX OR MORE DRINKS ON ONE OCCASION: NEVER
HOW MANY STANDARD DRINKS CONTAINING ALCOHOL DO YOU HAVE ON A TYPICAL DAY: PATIENT DOES NOT DRINK
SKIP TO QUESTIONS 9-10: 1
HOW OFTEN DO YOU HAVE A DRINK CONTAINING ALCOHOL: NEVER
AUDIT-C TOTAL SCORE: 0

## 2025-04-01 ASSESSMENT — PAIN SCALES - GENERAL: PAINLEVEL_OUTOF10: 0-NO PAIN

## 2025-04-01 NOTE — SIGNIFICANT EVENT

## 2025-04-02 ENCOUNTER — INFUSION (OUTPATIENT)
Dept: HEMATOLOGY/ONCOLOGY | Facility: HOSPITAL | Age: 85
End: 2025-04-02
Payer: MEDICARE

## 2025-04-02 VITALS
HEART RATE: 58 BPM | WEIGHT: 188.49 LBS | RESPIRATION RATE: 16 BRPM | BODY MASS INDEX: 29.72 KG/M2 | OXYGEN SATURATION: 97 % | TEMPERATURE: 97.3 F | SYSTOLIC BLOOD PRESSURE: 138 MMHG | DIASTOLIC BLOOD PRESSURE: 70 MMHG

## 2025-04-02 DIAGNOSIS — C83.00 SMALL LYMPHOCYTIC LYMPHOMA (MULTI): ICD-10-CM

## 2025-04-02 PROCEDURE — 96415 CHEMO IV INFUSION ADDL HR: CPT

## 2025-04-02 PROCEDURE — 96374 THER/PROPH/DIAG INJ IV PUSH: CPT | Mod: INF,INF2

## 2025-04-02 PROCEDURE — 96413 CHEMO IV INFUSION 1 HR: CPT

## 2025-04-02 PROCEDURE — 96367 TX/PROPH/DG ADDL SEQ IV INF: CPT | Mod: INF,INF2

## 2025-04-02 PROCEDURE — 2500000004 HC RX 250 GENERAL PHARMACY W/ HCPCS (ALT 636 FOR OP/ED): Performed by: INTERNAL MEDICINE

## 2025-04-02 PROCEDURE — 2500000001 HC RX 250 WO HCPCS SELF ADMINISTERED DRUGS (ALT 637 FOR MEDICARE OP): Performed by: INTERNAL MEDICINE

## 2025-04-02 PROCEDURE — RXMED WILLOW AMBULATORY MEDICATION CHARGE

## 2025-04-02 RX ORDER — PROCHLORPERAZINE MALEATE 10 MG
10 TABLET ORAL EVERY 6 HOURS PRN
Status: DISCONTINUED | OUTPATIENT
Start: 2025-04-02 | End: 2025-04-02 | Stop reason: HOSPADM

## 2025-04-02 RX ORDER — DIPHENHYDRAMINE HCL 25 MG
50 CAPSULE ORAL ONCE
Status: COMPLETED | OUTPATIENT
Start: 2025-04-02 | End: 2025-04-02

## 2025-04-02 RX ORDER — FAMOTIDINE 10 MG/ML
20 INJECTION, SOLUTION INTRAVENOUS ONCE AS NEEDED
Status: DISCONTINUED | OUTPATIENT
Start: 2025-04-02 | End: 2025-04-02 | Stop reason: HOSPADM

## 2025-04-02 RX ORDER — ALBUTEROL SULFATE 0.83 MG/ML
3 SOLUTION RESPIRATORY (INHALATION) AS NEEDED
Status: DISCONTINUED | OUTPATIENT
Start: 2025-04-02 | End: 2025-04-02 | Stop reason: HOSPADM

## 2025-04-02 RX ORDER — PROCHLORPERAZINE EDISYLATE 5 MG/ML
10 INJECTION INTRAMUSCULAR; INTRAVENOUS EVERY 6 HOURS PRN
Status: DISCONTINUED | OUTPATIENT
Start: 2025-04-02 | End: 2025-04-02 | Stop reason: HOSPADM

## 2025-04-02 RX ORDER — EPINEPHRINE 0.3 MG/.3ML
0.3 INJECTION SUBCUTANEOUS EVERY 5 MIN PRN
Status: DISCONTINUED | OUTPATIENT
Start: 2025-04-02 | End: 2025-04-02 | Stop reason: HOSPADM

## 2025-04-02 RX ORDER — DEXAMETHASONE IN 0.9 % SOD CHL 20 MG/50ML
20 INTRAVENOUS SOLUTION, PIGGYBACK (ML) INTRAVENOUS ONCE
Status: DISCONTINUED | OUTPATIENT
Start: 2025-04-02 | End: 2025-04-02

## 2025-04-02 RX ORDER — DIPHENHYDRAMINE HYDROCHLORIDE 50 MG/ML
50 INJECTION, SOLUTION INTRAMUSCULAR; INTRAVENOUS AS NEEDED
Status: DISCONTINUED | OUTPATIENT
Start: 2025-04-02 | End: 2025-04-02 | Stop reason: HOSPADM

## 2025-04-02 RX ORDER — ACETAMINOPHEN 325 MG/1
650 TABLET ORAL ONCE
Status: COMPLETED | OUTPATIENT
Start: 2025-04-02 | End: 2025-04-02

## 2025-04-02 RX ADMIN — DIPHENHYDRAMINE HYDROCHLORIDE 50 MG: 25 CAPSULE ORAL at 08:47

## 2025-04-02 RX ADMIN — ACETAMINOPHEN 650 MG: 325 TABLET, FILM COATED ORAL at 08:47

## 2025-04-02 RX ADMIN — DEXAMETHASONE SODIUM PHOSPHATE 20 MG: 10 INJECTION INTRAMUSCULAR; INTRAVENOUS at 09:17

## 2025-04-02 RX ADMIN — OBINUTUZUMAB 900 MG: 1000 INJECTION, SOLUTION, CONCENTRATE INTRAVENOUS at 09:45

## 2025-04-02 ASSESSMENT — PATIENT HEALTH QUESTIONNAIRE - PHQ9
1. LITTLE INTEREST OR PLEASURE IN DOING THINGS: NOT AT ALL
SUM OF ALL RESPONSES TO PHQ9 QUESTIONS 1 & 2: 0
2. FEELING DOWN, DEPRESSED OR HOPELESS: NOT AT ALL

## 2025-04-02 ASSESSMENT — PAIN SCALES - GENERAL: PAINLEVEL_OUTOF10: 0-NO PAIN

## 2025-04-02 NOTE — PROGRESS NOTES
Gazyva Infusion    0945: 16.8ml/hr  1015: 33.7ml/hr  1045: 50.5ml/hr  1115: 67.3ml/hr  1145: 84.2ml/hr  1215: 101.0ml/hr  1245: 117.8ml/hr  1315: 134.7ml/hr

## 2025-04-02 NOTE — SIGNIFICANT EVENT

## 2025-04-03 ENCOUNTER — SPECIALTY PHARMACY (OUTPATIENT)
Dept: PHARMACY | Facility: CLINIC | Age: 85
End: 2025-04-03

## 2025-04-04 ENCOUNTER — PHARMACY VISIT (OUTPATIENT)
Dept: PHARMACY | Facility: CLINIC | Age: 85
End: 2025-04-04
Payer: COMMERCIAL

## 2025-04-07 ENCOUNTER — SPECIALTY PHARMACY (OUTPATIENT)
Dept: PHARMACY | Facility: CLINIC | Age: 85
End: 2025-04-07

## 2025-04-07 NOTE — PROGRESS NOTES
[facility] Clinical Note  Initial Patient Education     Introduction  Donavon Brush is a 84 y.o. male who is on the specialty pharmacy service for management of: Oncology Core.    Donavon Brush is initiating the following therapy: Venclexta-   Week 1: Take two 10mg tablets by mouth once daily. Week 2: Take one 50mg tablet once daily. Week 3: Take one 100mg tablet once daily. Week 4: Take two 100mg tablets once daily.    Medication receipt date: 4/7/25  Duration of therapy: Maintenance    The most recent encounter visit with the referring prescriber Kael Chris on 3/21/25 was reviewed.  Pharmacy will continue to collaborate in the care of this patient with the referring prescriber.    Clinical Background  An initial assessment was conducted prior to first fill of the medication to determine the appropriateness of therapy given the patient's diagnosis, medication list, comorbidities, allergies, medical history, patient's ability to self administer medication, and therapeutic goals based on possible outcomes of therapy. Refer to initial assessment task completed on 3/1/25.    Labs/Procedures for clinical appropriateness that were reviewed include:   Oncology - CBC-diff:   Lab Results   Component Value Date    WBC 11.1 03/31/2025    RBC 4.75 03/31/2025    HGB 13.8 03/31/2025    HCT 42.6 03/31/2025    MCV 90 03/31/2025    MCHC 32.4 03/31/2025     (L) 03/31/2025    RDW 14.4 03/31/2025    NEUTOPHILPCT 43.5 03/31/2025    IGPCT 0.3 03/31/2025    LYMPHOPCT 44.1 03/31/2025    MONOPCT 7.1 03/31/2025    EOSPCT 4.5 03/31/2025    BASOPCT 0.5 03/31/2025    NEUTROABS 4.80 03/31/2025    LYMPHSABS 4.87 (H) 03/31/2025    MONOSABS 0.79 03/31/2025    EOSABS 0.50 (H) 03/31/2025    BASOSABS 0.06 03/31/2025    and CMP:   Lab Results   Component Value Date    GLUCOSE 109 (H) 03/31/2025     03/31/2025    K 4.5 03/31/2025     03/31/2025    CO2 31 03/31/2025    ANIONGAP 9 (L) 03/31/2025    BUN  26 (H) 03/31/2025    CREATININE 1.68 (H) 03/31/2025    CALCIUM 9.4 03/31/2025    ALBUMIN 4.0 03/31/2025    ALKPHOS 54 03/31/2025    PROT 6.6 03/31/2025    AST 13 03/31/2025    BILITOT 0.8 03/31/2025    ALT 10 03/31/2025       Education/Discussion  Donavon was contacted on 4/7/2025 at 11:17 AM for a pharmacy visit with encounter number [id number] from:   [facility]  [address] [place] [id number]  Dept: [phone]  Dept Fax: [phone]  Spouse consented to a/an Telephone visit, which was performed. - pt ok'ed to speak to spouse     Medication Start Date (planned or actual): provider will let patient know   Education was conducted prior to start of therapy? Yes    Education discussed includes the following:  Patient Education  Counseled the Patient on the Following : Doses and administration, Adherence and missed doses, Possible side effects and management, Safe handling, storage, and disposal, Pharmacy contact information, Contraindications and precautions  Learner: Significant other  Education Method: Explanation  Education Response: Verbalizes understanding  Additional details of the medication specific counseling are found within the linked patient education flowsheet.     The follow up timeline was discussed. Every person responds to and reacts to therapy differently. Patient should be assessed for efficacy and tolerability in approximately: 10-14 days    Provided education on goals and possible outcomes of therapy:  Adherence with therapy  Timely completion of appropriate labs  Timely and appropriate follow up with provider  Identify and address medication interactions with presciption medications, OTC medications and supplements  Optimize or maintain quality of life  Oncology: Prolong life/No disease progression  Manage side effects (ex: nausea/vomiting, constipation, fatigue) in conjunction with care team    The importance of adherence was discussed and they were  advised to take the medication as prescribed by their provider.     Impression/Plan  Review and Assessment   Reviewed During This Encounter: Medications  Medications Assessed for Appropriate Use, Dose, Route, Frequency, and Duration: Yes  Medication Reconciliation Completed: No (Comment)  Drug Interactions Evaluated: Yes  Clinically Relevant Drug Interactions Identified: No    This patient has been identified as high risk due to Geriatric (over 65 years of age).  The following action was taken: N/A.         The  Specialty Pharmacy Welcome packet may be viewed here:   Specialty Pharmacy Welcome Packet     Or by scanning QR code:      Provided contact information ([phone]) for [facility] and reviewed dispensing process, refill timeline and patient management follow up. Advised to contact the pharmacy if there are any adverse effects and/or changes to medication list, including prescriptions, OTC medications, herbal products, or supplements. Confirmed understanding of education conducted during assessment. All questions and concerns were addressed and patient was encouraged to reach out for additional questions or concerns.      Chaka Jc, PharmD

## 2025-04-08 ENCOUNTER — APPOINTMENT (OUTPATIENT)
Dept: HEMATOLOGY/ONCOLOGY | Facility: HOSPITAL | Age: 85
End: 2025-04-08
Payer: MEDICARE

## 2025-04-09 ENCOUNTER — OFFICE VISIT (OUTPATIENT)
Dept: HEMATOLOGY/ONCOLOGY | Facility: HOSPITAL | Age: 85
End: 2025-04-09
Payer: MEDICARE

## 2025-04-09 ENCOUNTER — APPOINTMENT (OUTPATIENT)
Dept: OPHTHALMOLOGY | Facility: CLINIC | Age: 85
End: 2025-04-09
Payer: MEDICARE

## 2025-04-09 ENCOUNTER — INFUSION (OUTPATIENT)
Dept: HEMATOLOGY/ONCOLOGY | Facility: HOSPITAL | Age: 85
End: 2025-04-09
Payer: MEDICARE

## 2025-04-09 VITALS
HEART RATE: 60 BPM | RESPIRATION RATE: 17 BRPM | DIASTOLIC BLOOD PRESSURE: 58 MMHG | SYSTOLIC BLOOD PRESSURE: 115 MMHG | OXYGEN SATURATION: 97 % | TEMPERATURE: 96.6 F

## 2025-04-09 VITALS — BODY MASS INDEX: 28.82 KG/M2 | HEIGHT: 67 IN | WEIGHT: 183.64 LBS

## 2025-04-09 DIAGNOSIS — C83.00 SMALL LYMPHOCYTIC LYMPHOMA (MULTI): ICD-10-CM

## 2025-04-09 DIAGNOSIS — H05.89 ORBITAL MASS: ICD-10-CM

## 2025-04-09 DIAGNOSIS — C83.00 SMALL LYMPHOCYTIC LYMPHOMA (MULTI): Primary | ICD-10-CM

## 2025-04-09 LAB
ALBUMIN SERPL BCP-MCNC: 3.5 G/DL (ref 3.4–5)
ALP SERPL-CCNC: 56 U/L (ref 33–136)
ALT SERPL W P-5'-P-CCNC: 17 U/L (ref 10–52)
ANION GAP SERPL CALC-SCNC: 8 MMOL/L (ref 10–20)
AST SERPL W P-5'-P-CCNC: 15 U/L (ref 9–39)
BASOPHILS # BLD AUTO: 0.01 X10*3/UL (ref 0–0.1)
BASOPHILS NFR BLD AUTO: 0.1 %
BILIRUB SERPL-MCNC: 0.7 MG/DL (ref 0–1.2)
BUN SERPL-MCNC: 27 MG/DL (ref 6–23)
CALCIUM SERPL-MCNC: 9 MG/DL (ref 8.6–10.3)
CHLORIDE SERPL-SCNC: 103 MMOL/L (ref 98–107)
CO2 SERPL-SCNC: 32 MMOL/L (ref 21–32)
CREAT SERPL-MCNC: 1.51 MG/DL (ref 0.5–1.3)
EGFRCR SERPLBLD CKD-EPI 2021: 45 ML/MIN/1.73M*2
EOSINOPHIL # BLD AUTO: 0.33 X10*3/UL (ref 0–0.4)
EOSINOPHIL NFR BLD AUTO: 4.9 %
ERYTHROCYTE [DISTWIDTH] IN BLOOD BY AUTOMATED COUNT: 15.1 % (ref 11.5–14.5)
GLUCOSE SERPL-MCNC: 89 MG/DL (ref 74–99)
HCT VFR BLD AUTO: 42.3 % (ref 41–52)
HGB BLD-MCNC: 13.6 G/DL (ref 13.5–17.5)
IMM GRANULOCYTES # BLD AUTO: 0.07 X10*3/UL (ref 0–0.5)
IMM GRANULOCYTES NFR BLD AUTO: 1 % (ref 0–0.9)
LYMPHOCYTES # BLD AUTO: 0.56 X10*3/UL (ref 0.8–3)
LYMPHOCYTES NFR BLD AUTO: 8.3 %
MCH RBC QN AUTO: 29.2 PG (ref 26–34)
MCHC RBC AUTO-ENTMCNC: 32.2 G/DL (ref 32–36)
MCV RBC AUTO: 91 FL (ref 80–100)
MONOCYTES # BLD AUTO: 0.82 X10*3/UL (ref 0.05–0.8)
MONOCYTES NFR BLD AUTO: 12.1 %
NEUTROPHILS # BLD AUTO: 4.96 X10*3/UL (ref 1.6–5.5)
NEUTROPHILS NFR BLD AUTO: 73.6 %
NRBC BLD-RTO: ABNORMAL /100{WBCS}
PLATELET # BLD AUTO: 125 X10*3/UL (ref 150–450)
POTASSIUM SERPL-SCNC: 4.3 MMOL/L (ref 3.5–5.3)
PROT SERPL-MCNC: 5.9 G/DL (ref 6.4–8.2)
RBC # BLD AUTO: 4.65 X10*6/UL (ref 4.5–5.9)
SODIUM SERPL-SCNC: 139 MMOL/L (ref 136–145)
WBC # BLD AUTO: 6.8 X10*3/UL (ref 4.4–11.3)

## 2025-04-09 PROCEDURE — 2500000001 HC RX 250 WO HCPCS SELF ADMINISTERED DRUGS (ALT 637 FOR MEDICARE OP): Performed by: INTERNAL MEDICINE

## 2025-04-09 PROCEDURE — 99215 OFFICE O/P EST HI 40 MIN: CPT | Mod: 25 | Performed by: INTERNAL MEDICINE

## 2025-04-09 PROCEDURE — 96413 CHEMO IV INFUSION 1 HR: CPT

## 2025-04-09 PROCEDURE — 99215 OFFICE O/P EST HI 40 MIN: CPT | Performed by: INTERNAL MEDICINE

## 2025-04-09 PROCEDURE — 2500000004 HC RX 250 GENERAL PHARMACY W/ HCPCS (ALT 636 FOR OP/ED): Performed by: INTERNAL MEDICINE

## 2025-04-09 PROCEDURE — 80053 COMPREHEN METABOLIC PANEL: CPT

## 2025-04-09 PROCEDURE — 96374 THER/PROPH/DIAG INJ IV PUSH: CPT | Mod: INF,INF2

## 2025-04-09 PROCEDURE — 1159F MED LIST DOCD IN RCRD: CPT | Performed by: INTERNAL MEDICINE

## 2025-04-09 PROCEDURE — 1123F ACP DISCUSS/DSCN MKR DOCD: CPT | Performed by: INTERNAL MEDICINE

## 2025-04-09 PROCEDURE — 96375 TX/PRO/DX INJ NEW DRUG ADDON: CPT | Mod: INF

## 2025-04-09 PROCEDURE — 1125F AMNT PAIN NOTED PAIN PRSNT: CPT | Performed by: INTERNAL MEDICINE

## 2025-04-09 PROCEDURE — 85025 COMPLETE CBC W/AUTO DIFF WBC: CPT

## 2025-04-09 PROCEDURE — G2211 COMPLEX E/M VISIT ADD ON: HCPCS | Performed by: INTERNAL MEDICINE

## 2025-04-09 PROCEDURE — 96415 CHEMO IV INFUSION ADDL HR: CPT

## 2025-04-09 RX ORDER — PROCHLORPERAZINE EDISYLATE 5 MG/ML
10 INJECTION INTRAMUSCULAR; INTRAVENOUS EVERY 6 HOURS PRN
OUTPATIENT
Start: 2025-04-29

## 2025-04-09 RX ORDER — ALBUTEROL SULFATE 0.83 MG/ML
3 SOLUTION RESPIRATORY (INHALATION) AS NEEDED
OUTPATIENT
Start: 2025-04-29

## 2025-04-09 RX ORDER — DIPHENHYDRAMINE HYDROCHLORIDE 50 MG/ML
50 INJECTION, SOLUTION INTRAMUSCULAR; INTRAVENOUS AS NEEDED
OUTPATIENT
Start: 2025-04-29

## 2025-04-09 RX ORDER — DIPHENHYDRAMINE HCL 25 MG
50 CAPSULE ORAL ONCE
Status: COMPLETED | OUTPATIENT
Start: 2025-04-09 | End: 2025-04-09

## 2025-04-09 RX ORDER — FAMOTIDINE 10 MG/ML
20 INJECTION, SOLUTION INTRAVENOUS ONCE AS NEEDED
Status: DISCONTINUED | OUTPATIENT
Start: 2025-04-09 | End: 2025-04-09 | Stop reason: HOSPADM

## 2025-04-09 RX ORDER — DIPHENHYDRAMINE HYDROCHLORIDE 50 MG/ML
50 INJECTION, SOLUTION INTRAMUSCULAR; INTRAVENOUS AS NEEDED
Status: DISCONTINUED | OUTPATIENT
Start: 2025-04-09 | End: 2025-04-09 | Stop reason: HOSPADM

## 2025-04-09 RX ORDER — FAMOTIDINE 10 MG/ML
20 INJECTION, SOLUTION INTRAVENOUS ONCE AS NEEDED
OUTPATIENT
Start: 2025-04-29

## 2025-04-09 RX ORDER — EPINEPHRINE 0.3 MG/.3ML
0.3 INJECTION SUBCUTANEOUS EVERY 5 MIN PRN
OUTPATIENT
Start: 2025-04-29

## 2025-04-09 RX ORDER — PROCHLORPERAZINE EDISYLATE 5 MG/ML
10 INJECTION INTRAMUSCULAR; INTRAVENOUS EVERY 6 HOURS PRN
Status: DISCONTINUED | OUTPATIENT
Start: 2025-04-09 | End: 2025-04-09 | Stop reason: HOSPADM

## 2025-04-09 RX ORDER — ALBUTEROL SULFATE 0.83 MG/ML
3 SOLUTION RESPIRATORY (INHALATION) AS NEEDED
Status: DISCONTINUED | OUTPATIENT
Start: 2025-04-09 | End: 2025-04-09 | Stop reason: HOSPADM

## 2025-04-09 RX ORDER — PROCHLORPERAZINE MALEATE 10 MG
10 TABLET ORAL EVERY 6 HOURS PRN
Status: DISCONTINUED | OUTPATIENT
Start: 2025-04-09 | End: 2025-04-09 | Stop reason: HOSPADM

## 2025-04-09 RX ORDER — ACETAMINOPHEN 325 MG/1
650 TABLET ORAL ONCE
Status: COMPLETED | OUTPATIENT
Start: 2025-04-09 | End: 2025-04-09

## 2025-04-09 RX ORDER — EPINEPHRINE 0.3 MG/.3ML
0.3 INJECTION SUBCUTANEOUS EVERY 5 MIN PRN
Status: DISCONTINUED | OUTPATIENT
Start: 2025-04-09 | End: 2025-04-09 | Stop reason: HOSPADM

## 2025-04-09 RX ORDER — DIPHENHYDRAMINE HCL 50 MG
50 CAPSULE ORAL ONCE
OUTPATIENT
Start: 2025-04-29

## 2025-04-09 RX ORDER — ACETAMINOPHEN 325 MG/1
650 TABLET ORAL ONCE
OUTPATIENT
Start: 2025-04-29

## 2025-04-09 RX ORDER — PROCHLORPERAZINE MALEATE 5 MG
10 TABLET ORAL EVERY 6 HOURS PRN
OUTPATIENT
Start: 2025-04-29

## 2025-04-09 RX ADMIN — DIPHENHYDRAMINE HYDROCHLORIDE 50 MG: 25 CAPSULE ORAL at 08:42

## 2025-04-09 RX ADMIN — DEXAMETHASONE SODIUM PHOSPHATE 20 MG: 10 INJECTION INTRAMUSCULAR; INTRAVENOUS at 09:01

## 2025-04-09 RX ADMIN — OBINUTUZUMAB 1000 MG: 1000 INJECTION, SOLUTION, CONCENTRATE INTRAVENOUS at 09:25

## 2025-04-09 RX ADMIN — ACETAMINOPHEN 650 MG: 325 TABLET, FILM COATED ORAL at 08:42

## 2025-04-09 ASSESSMENT — PAIN SCALES - GENERAL: PAINLEVEL_OUTOF10: 5

## 2025-04-09 ASSESSMENT — PATIENT HEALTH QUESTIONNAIRE - PHQ9
2. FEELING DOWN, DEPRESSED OR HOPELESS: NOT AT ALL
SUM OF ALL RESPONSES TO PHQ9 QUESTIONS 1 & 2: 0
1. LITTLE INTEREST OR PLEASURE IN DOING THINGS: NOT AT ALL

## 2025-04-09 ASSESSMENT — ENCOUNTER SYMPTOMS
GASTROINTESTINAL NEGATIVE: 1
RESPIRATORY NEGATIVE: 1
FEVER: 0
CARDIOVASCULAR NEGATIVE: 1
FATIGUE: 1

## 2025-04-09 NOTE — PROGRESS NOTES
Gazyva Infusion increases    0925: 30.7ml/hr  0955: 61.4ml/hf  1025: 92.1ml/hr  1055: 122.8ml/hr til end of infusion

## 2025-04-09 NOTE — PROGRESS NOTES
"Patient ID: Donavon Brush is a 84 y.o. male.    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   L eye pressure improved somewhat    Assessment/Plan:  ? 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 Ki67 was 10%. PET reveals uptake in multiple LN, lungs, and possibly a kidney lesion. He required treatment.     We started him on obinutuzumab + venetoclax last week. Tolerating it well. Eye feels somewhat better. Will continue with day8 today. Start venetoclax with C2.    Review Of Systems:  Review of Systems   Constitutional:  Positive for fatigue. Negative for fever.   HENT:  Negative.     Respiratory: Negative.     Cardiovascular: Negative.    Gastrointestinal: Negative.    Genitourinary: Negative.         Physical Exam:  Ht 1.696 m (5' 6.77\")   Wt 83.3 kg (183 lb 10.3 oz)   BMI 28.96 kg/m²   BSA: 1.98 meters squared  Performance Status: Asymptomatic  Physical Exam  HENT:      Head: Normocephalic and atraumatic.   Eyes:      General: No scleral icterus.  Pulmonary:      Effort: Pulmonary effort is normal.   Musculoskeletal:         General: Normal range of motion.   Skin:     Coloration: Skin is not jaundiced.   Neurological:      General: No focal deficit present.      Mental Status: He is alert and oriented to person, place, and time.         Results:  Diagnostic Results   Lab Results   Component Value Date    WBC 6.8 04/09/2025    HGB 13.6 04/09/2025    HCT 42.3 04/09/2025    MCV 91 04/09/2025     (L) 04/09/2025     Lab Results   Component Value Date    CALCIUM 9.0 04/09/2025     04/09/2025    K 4.3 04/09/2025    CO2 32 04/09/2025     04/09/2025    BUN 27 (H) 04/09/2025    CREATININE 1.51 (H) 04/09/2025    ALT 17 04/09/2025    AST 15 04/09/2025       Current Outpatient Medications:     acyclovir (Zovirax) 400 mg tablet, Take 1 tablet (400 mg) by mouth 2 times a day., Disp: 60 tablet, Rfl: 5    allopurinol (Zyloprim) 300 mg " tablet, Take 1 tablet (300 mg) by mouth once daily., Disp: 30 tablet, Rfl: 1    carvedilol (Coreg) 25 mg tablet, Take 1 tablet (25 mg) by mouth 2 times daily (morning and late afternoon)., Disp: 180 tablet, Rfl: 3    erythromycin (Romycin) 5 mg/gram (0.5 %) ophthalmic ointment, Apply to left eye every 6 hours. APPLY 1/2 INCH RIBBON IN THE LEFT EYE UP TO THREE TIMES DAILY, Disp: 3.5 g, Rfl: 1    levothyroxine (Euthyrox) 75 mcg tablet, TAKE ONE TABLET BY MOUTH once DAILY as directed **need lab work**, Disp: 90 tablet, Rfl: 3    neomycin-bacitracin-polymyxin-HC (Cortisporin) 3.5-400-10,000 mg-unit/g-1% ophthalmic ointment, Apply to left eye twice a day for 2 weeks, Disp: 3.5 g, Rfl: 0    ondansetron (Zofran) 8 mg tablet, Take 1 tablet (8 mg) by mouth every 8 hours if needed for nausea or vomiting., Disp: 30 tablet, Rfl: 5    sacubitriL-valsartan (Entresto) 24-26 mg tablet, Take 1 tablet by mouth 2 times a day., Disp: 60 tablet, Rfl: 5    venetoclax (Venclexta) 10 mg-50 mg- 100 mg tablet therapy pack, Take per package instructions as directed with food. Week 1: Take two 10mg tablets by mouth once daily. Week 2: Take one 50mg tablet once daily. Week 3: Take one 100mg tablet once daily. Week 4: Take two 100mg tablets once daily., Disp: 42 each, Rfl: 0    warfarin (Coumadin) 7.5 mg tablet, Take 1 tablet (7.5 mg) by mouth once daily at bedtime., Disp: 90 tablet, Rfl: 3  No current facility-administered medications for this visit.    Facility-Administered Medications Ordered in Other Visits:     acetaminophen (Tylenol) tablet 650 mg, 650 mg, oral, Once, Kael Chris MD    albuterol 2.5 mg /3 mL (0.083 %) nebulizer solution 3 mL, 3 mL, nebulization, PRN, Kael Chris MD    dexAMETHasone (Decadron) 20 mg in dextrose 5% 50 mL IV, 20 mg, intravenous, Once, Kael Chris MD    dextrose 5 % in water (D5W) bolus 500 mL, 500 mL, intravenous, PRN, Kael Chris MD    diphenhydrAMINE (BENADryl) capsule 50 mg, 50 mg,  oral, Once, Kael Chris MD    diphenhydrAMINE (BENADryl) injection 50 mg, 50 mg, intravenous, PRN, Kael Chris MD    EPINEPHrine (Epipen) injection syringe 0.3 mg, 0.3 mg, intramuscular, q5 min PRN, Kael Chris MD    famotidine PF (Pepcid) injection 20 mg, 20 mg, intravenous, Once PRN, Kael Chris MD    meperidine (PF) (Demerol) injection 25 mg, 25 mg, intravenous, Once PRN, Kael Chris MD    methylPREDNISolone sod succinate (SOLU-Medrol) 40 mg/mL injection 40 mg, 40 mg, intravenous, PRN, Kael Chris MD    obinutuzumab (Gazyva) 1,000 mg in sodium chloride 0.9% 290 mL IV, 1,000 mg, intravenous, Once, Kael Chris MD    prochlorperazine (Compazine) injection 10 mg, 10 mg, intravenous, q6h PRN, Kael Chris MD    prochlorperazine (Compazine) tablet 10 mg, 10 mg, oral, q6h PRN, Kael Chris MD    sodium chloride 0.9 % bolus 500 mL, 500 mL, intravenous, PRN, Kael Chris MD     Past Surgical History:   Procedure Laterality Date    AORTIC VALVE REPLACEMENT  2003    CARDIAC PACEMAKER PLACEMENT  09/22/2014    Pacemaker Placement    CATARACT EXTRACTION Bilateral     FRACTURE SURGERY      shoulder fracture    KNEE ARTHROPLASTY Left     PROSTATE SURGERY      TURP     Family History   Problem Relation Name Age of Onset    Alzheimer's disease Mother          passed at 84    No Known Problems Father          passed at 66    Heart disease Brother Enrrique     No Known Problems Daughter      No Known Problems Son      No Known Problems Son        reports that he quit smoking about 25 years ago. His smoking use included cigarettes. He has never used smokeless tobacco.    There are no diagnoses linked to this encounter.       Kael Chris MD

## 2025-04-09 NOTE — SIGNIFICANT EVENT

## 2025-04-15 ENCOUNTER — APPOINTMENT (OUTPATIENT)
Dept: HEMATOLOGY/ONCOLOGY | Facility: HOSPITAL | Age: 85
End: 2025-04-15
Payer: MEDICARE

## 2025-04-16 ENCOUNTER — INFUSION (OUTPATIENT)
Dept: HEMATOLOGY/ONCOLOGY | Facility: HOSPITAL | Age: 85
End: 2025-04-16
Payer: MEDICARE

## 2025-04-16 VITALS
WEIGHT: 183.64 LBS | SYSTOLIC BLOOD PRESSURE: 146 MMHG | TEMPERATURE: 96.6 F | DIASTOLIC BLOOD PRESSURE: 75 MMHG | OXYGEN SATURATION: 97 % | RESPIRATION RATE: 17 BRPM | HEART RATE: 59 BPM | BODY MASS INDEX: 28.96 KG/M2

## 2025-04-16 DIAGNOSIS — C83.00 SMALL LYMPHOCYTIC LYMPHOMA (MULTI): ICD-10-CM

## 2025-04-16 LAB
ALBUMIN SERPL BCP-MCNC: 3.6 G/DL (ref 3.4–5)
ALP SERPL-CCNC: 55 U/L (ref 33–136)
ALT SERPL W P-5'-P-CCNC: 19 U/L (ref 10–52)
ANION GAP SERPL CALC-SCNC: 8 MMOL/L (ref 10–20)
AST SERPL W P-5'-P-CCNC: 14 U/L (ref 9–39)
BASOPHILS # BLD AUTO: 0.02 X10*3/UL (ref 0–0.1)
BASOPHILS NFR BLD AUTO: 0.3 %
BILIRUB SERPL-MCNC: 1.1 MG/DL (ref 0–1.2)
BUN SERPL-MCNC: 28 MG/DL (ref 6–23)
CALCIUM SERPL-MCNC: 9.2 MG/DL (ref 8.6–10.3)
CHLORIDE SERPL-SCNC: 104 MMOL/L (ref 98–107)
CO2 SERPL-SCNC: 30 MMOL/L (ref 21–32)
CREAT SERPL-MCNC: 1.56 MG/DL (ref 0.5–1.3)
EGFRCR SERPLBLD CKD-EPI 2021: 44 ML/MIN/1.73M*2
EOSINOPHIL # BLD AUTO: 0.21 X10*3/UL (ref 0–0.4)
EOSINOPHIL NFR BLD AUTO: 3 %
ERYTHROCYTE [DISTWIDTH] IN BLOOD BY AUTOMATED COUNT: 14.9 % (ref 11.5–14.5)
GLUCOSE SERPL-MCNC: 95 MG/DL (ref 74–99)
HCT VFR BLD AUTO: 41 % (ref 41–52)
HGB BLD-MCNC: 13.4 G/DL (ref 13.5–17.5)
IMM GRANULOCYTES # BLD AUTO: 0.03 X10*3/UL (ref 0–0.5)
IMM GRANULOCYTES NFR BLD AUTO: 0.4 % (ref 0–0.9)
LYMPHOCYTES # BLD AUTO: 0.7 X10*3/UL (ref 0.8–3)
LYMPHOCYTES NFR BLD AUTO: 10 %
MCH RBC QN AUTO: 29.6 PG (ref 26–34)
MCHC RBC AUTO-ENTMCNC: 32.7 G/DL (ref 32–36)
MCV RBC AUTO: 91 FL (ref 80–100)
MONOCYTES # BLD AUTO: 0.7 X10*3/UL (ref 0.05–0.8)
MONOCYTES NFR BLD AUTO: 10 %
NEUTROPHILS # BLD AUTO: 5.37 X10*3/UL (ref 1.6–5.5)
NEUTROPHILS NFR BLD AUTO: 76.3 %
NRBC BLD-RTO: 0 /100 WBCS (ref 0–0)
PLATELET # BLD AUTO: 119 X10*3/UL (ref 150–450)
POTASSIUM SERPL-SCNC: 4.3 MMOL/L (ref 3.5–5.3)
PROT SERPL-MCNC: 6 G/DL (ref 6.4–8.2)
RBC # BLD AUTO: 4.52 X10*6/UL (ref 4.5–5.9)
SODIUM SERPL-SCNC: 138 MMOL/L (ref 136–145)
WBC # BLD AUTO: 7 X10*3/UL (ref 4.4–11.3)

## 2025-04-16 PROCEDURE — 96413 CHEMO IV INFUSION 1 HR: CPT

## 2025-04-16 PROCEDURE — 2500000004 HC RX 250 GENERAL PHARMACY W/ HCPCS (ALT 636 FOR OP/ED): Mod: JZ | Performed by: INTERNAL MEDICINE

## 2025-04-16 PROCEDURE — 96367 TX/PROPH/DG ADDL SEQ IV INF: CPT | Mod: INF,INF2

## 2025-04-16 PROCEDURE — 80053 COMPREHEN METABOLIC PANEL: CPT

## 2025-04-16 PROCEDURE — 96415 CHEMO IV INFUSION ADDL HR: CPT

## 2025-04-16 PROCEDURE — 2500000001 HC RX 250 WO HCPCS SELF ADMINISTERED DRUGS (ALT 637 FOR MEDICARE OP): Performed by: INTERNAL MEDICINE

## 2025-04-16 PROCEDURE — 85025 COMPLETE CBC W/AUTO DIFF WBC: CPT

## 2025-04-16 RX ORDER — PROCHLORPERAZINE MALEATE 10 MG
10 TABLET ORAL EVERY 6 HOURS PRN
Status: DISCONTINUED | OUTPATIENT
Start: 2025-04-16 | End: 2025-04-16 | Stop reason: HOSPADM

## 2025-04-16 RX ORDER — EPINEPHRINE 0.3 MG/.3ML
0.3 INJECTION SUBCUTANEOUS EVERY 5 MIN PRN
Status: DISCONTINUED | OUTPATIENT
Start: 2025-04-16 | End: 2025-04-16 | Stop reason: HOSPADM

## 2025-04-16 RX ORDER — ACETAMINOPHEN 325 MG/1
650 TABLET ORAL ONCE
Status: COMPLETED | OUTPATIENT
Start: 2025-04-16 | End: 2025-04-16

## 2025-04-16 RX ORDER — DIPHENHYDRAMINE HYDROCHLORIDE 50 MG/ML
50 INJECTION, SOLUTION INTRAMUSCULAR; INTRAVENOUS AS NEEDED
Status: DISCONTINUED | OUTPATIENT
Start: 2025-04-16 | End: 2025-04-16 | Stop reason: HOSPADM

## 2025-04-16 RX ORDER — ALBUTEROL SULFATE 0.83 MG/ML
3 SOLUTION RESPIRATORY (INHALATION) AS NEEDED
Status: DISCONTINUED | OUTPATIENT
Start: 2025-04-16 | End: 2025-04-16 | Stop reason: HOSPADM

## 2025-04-16 RX ORDER — FAMOTIDINE 10 MG/ML
20 INJECTION, SOLUTION INTRAVENOUS ONCE AS NEEDED
Status: DISCONTINUED | OUTPATIENT
Start: 2025-04-16 | End: 2025-04-16 | Stop reason: HOSPADM

## 2025-04-16 RX ORDER — DIPHENHYDRAMINE HCL 25 MG
50 CAPSULE ORAL ONCE
Status: COMPLETED | OUTPATIENT
Start: 2025-04-16 | End: 2025-04-16

## 2025-04-16 RX ORDER — PROCHLORPERAZINE EDISYLATE 5 MG/ML
10 INJECTION INTRAMUSCULAR; INTRAVENOUS EVERY 6 HOURS PRN
Status: DISCONTINUED | OUTPATIENT
Start: 2025-04-16 | End: 2025-04-16 | Stop reason: HOSPADM

## 2025-04-16 RX ADMIN — DIPHENHYDRAMINE HYDROCHLORIDE 50 MG: 25 CAPSULE ORAL at 08:18

## 2025-04-16 RX ADMIN — ACETAMINOPHEN 650 MG: 325 TABLET, FILM COATED ORAL at 08:18

## 2025-04-16 RX ADMIN — DEXAMETHASONE SODIUM PHOSPHATE 20 MG: 10 INJECTION INTRAMUSCULAR; INTRAVENOUS at 08:32

## 2025-04-16 RX ADMIN — OBINUTUZUMAB 1000 MG: 1000 INJECTION, SOLUTION, CONCENTRATE INTRAVENOUS at 08:56

## 2025-04-16 ASSESSMENT — PAIN SCALES - GENERAL: PAINLEVEL_OUTOF10: 0-NO PAIN

## 2025-04-16 NOTE — SIGNIFICANT EVENT

## 2025-04-22 ENCOUNTER — LAB (OUTPATIENT)
Dept: LAB | Facility: HOSPITAL | Age: 85
End: 2025-04-22
Payer: MEDICARE

## 2025-04-22 ENCOUNTER — APPOINTMENT (OUTPATIENT)
Dept: LAB | Facility: HOSPITAL | Age: 85
End: 2025-04-22
Payer: MEDICARE

## 2025-04-22 LAB
ALBUMIN SERPL BCP-MCNC: 3.5 G/DL (ref 3.4–5)
ALP SERPL-CCNC: 50 U/L (ref 33–136)
ALT SERPL W P-5'-P-CCNC: 17 U/L (ref 10–52)
ANION GAP SERPL CALC-SCNC: 8 MMOL/L (ref 10–20)
AST SERPL W P-5'-P-CCNC: 13 U/L (ref 9–39)
BASOPHILS # BLD AUTO: 0.02 X10*3/UL (ref 0–0.1)
BASOPHILS NFR BLD AUTO: 0.3 %
BILIRUB SERPL-MCNC: 1.2 MG/DL (ref 0–1.2)
BUN SERPL-MCNC: 22 MG/DL (ref 6–23)
CALCIUM SERPL-MCNC: 9.1 MG/DL (ref 8.6–10.3)
CHLORIDE SERPL-SCNC: 105 MMOL/L (ref 98–107)
CO2 SERPL-SCNC: 32 MMOL/L (ref 21–32)
CREAT SERPL-MCNC: 1.54 MG/DL (ref 0.5–1.3)
EGFRCR SERPLBLD CKD-EPI 2021: 44 ML/MIN/1.73M*2
EOSINOPHIL # BLD AUTO: 0.17 X10*3/UL (ref 0–0.4)
EOSINOPHIL NFR BLD AUTO: 2.3 %
ERYTHROCYTE [DISTWIDTH] IN BLOOD BY AUTOMATED COUNT: 15.4 % (ref 11.5–14.5)
GLUCOSE SERPL-MCNC: 84 MG/DL (ref 74–99)
HCT VFR BLD AUTO: 40.9 % (ref 41–52)
HGB BLD-MCNC: 13.3 G/DL (ref 13.5–17.5)
IMM GRANULOCYTES # BLD AUTO: 0.02 X10*3/UL (ref 0–0.5)
IMM GRANULOCYTES NFR BLD AUTO: 0.3 % (ref 0–0.9)
LDH SERPL L TO P-CCNC: 192 U/L (ref 84–246)
LYMPHOCYTES # BLD AUTO: 0.79 X10*3/UL (ref 0.8–3)
LYMPHOCYTES NFR BLD AUTO: 10.9 %
MAGNESIUM SERPL-MCNC: 1.9 MG/DL (ref 1.6–2.4)
MCH RBC QN AUTO: 29.6 PG (ref 26–34)
MCHC RBC AUTO-ENTMCNC: 32.5 G/DL (ref 32–36)
MCV RBC AUTO: 91 FL (ref 80–100)
MONOCYTES # BLD AUTO: 0.57 X10*3/UL (ref 0.05–0.8)
MONOCYTES NFR BLD AUTO: 7.8 %
NEUTROPHILS # BLD AUTO: 5.7 X10*3/UL (ref 1.6–5.5)
NEUTROPHILS NFR BLD AUTO: 78.4 %
NRBC BLD-RTO: 0 /100 WBCS (ref 0–0)
PHOSPHATE SERPL-MCNC: 2.7 MG/DL (ref 2.5–4.9)
PLATELET # BLD AUTO: 105 X10*3/UL (ref 150–450)
POTASSIUM SERPL-SCNC: 4 MMOL/L (ref 3.5–5.3)
PROT SERPL-MCNC: 5.7 G/DL (ref 6.4–8.2)
RBC # BLD AUTO: 4.5 X10*6/UL (ref 4.5–5.9)
SODIUM SERPL-SCNC: 141 MMOL/L (ref 136–145)
URATE SERPL-MCNC: 4.5 MG/DL (ref 4–7.5)
WBC # BLD AUTO: 7.3 X10*3/UL (ref 4.4–11.3)

## 2025-04-22 PROCEDURE — 85025 COMPLETE CBC W/AUTO DIFF WBC: CPT

## 2025-04-22 PROCEDURE — 83735 ASSAY OF MAGNESIUM: CPT

## 2025-04-22 PROCEDURE — 84100 ASSAY OF PHOSPHORUS: CPT

## 2025-04-22 PROCEDURE — 80053 COMPREHEN METABOLIC PANEL: CPT

## 2025-04-22 PROCEDURE — 83615 LACTATE (LD) (LDH) ENZYME: CPT

## 2025-04-22 PROCEDURE — 84550 ASSAY OF BLOOD/URIC ACID: CPT

## 2025-04-24 ENCOUNTER — SPECIALTY PHARMACY (OUTPATIENT)
Dept: PHARMACY | Facility: CLINIC | Age: 85
End: 2025-04-24

## 2025-04-24 ENCOUNTER — TELEPHONE (OUTPATIENT)
Dept: HEMATOLOGY/ONCOLOGY | Facility: HOSPITAL | Age: 85
End: 2025-04-24
Payer: MEDICARE

## 2025-04-24 NOTE — TELEPHONE ENCOUNTER
Contacted patient, notified he has lab work ordered to have completed prior to his visit with Dr. Lynn on 4/29/25.

## 2025-04-25 ENCOUNTER — LAB (OUTPATIENT)
Dept: LAB | Facility: HOSPITAL | Age: 85
End: 2025-04-25
Payer: MEDICARE

## 2025-04-25 ENCOUNTER — SPECIALTY PHARMACY (OUTPATIENT)
Dept: PHARMACY | Facility: CLINIC | Age: 85
End: 2025-04-25

## 2025-04-25 DIAGNOSIS — C83.00 SMALL LYMPHOCYTIC LYMPHOMA (MULTI): ICD-10-CM

## 2025-04-25 LAB
ALBUMIN SERPL BCP-MCNC: 3.5 G/DL (ref 3.4–5)
ALP SERPL-CCNC: 56 U/L (ref 33–136)
ALT SERPL W P-5'-P-CCNC: 14 U/L (ref 10–52)
ANION GAP SERPL CALC-SCNC: 10 MMOL/L (ref 10–20)
AST SERPL W P-5'-P-CCNC: 17 U/L (ref 9–39)
BASOPHILS # BLD AUTO: 0.02 X10*3/UL (ref 0–0.1)
BASOPHILS NFR BLD AUTO: 0.4 %
BILIRUB SERPL-MCNC: 1 MG/DL (ref 0–1.2)
BUN SERPL-MCNC: 27 MG/DL (ref 6–23)
CALCIUM SERPL-MCNC: 9.2 MG/DL (ref 8.6–10.3)
CHLORIDE SERPL-SCNC: 103 MMOL/L (ref 98–107)
CO2 SERPL-SCNC: 28 MMOL/L (ref 21–32)
CREAT SERPL-MCNC: 1.53 MG/DL (ref 0.5–1.3)
EGFRCR SERPLBLD CKD-EPI 2021: 45 ML/MIN/1.73M*2
EOSINOPHIL # BLD AUTO: 0.21 X10*3/UL (ref 0–0.4)
EOSINOPHIL NFR BLD AUTO: 3.8 %
ERYTHROCYTE [DISTWIDTH] IN BLOOD BY AUTOMATED COUNT: 15.6 % (ref 11.5–14.5)
GLUCOSE SERPL-MCNC: 114 MG/DL (ref 74–99)
HCT VFR BLD AUTO: 40.4 % (ref 41–52)
HGB BLD-MCNC: 13 G/DL (ref 13.5–17.5)
IMM GRANULOCYTES # BLD AUTO: 0.02 X10*3/UL (ref 0–0.5)
IMM GRANULOCYTES NFR BLD AUTO: 0.4 % (ref 0–0.9)
LDH SERPL L TO P-CCNC: 276 U/L (ref 84–246)
LYMPHOCYTES # BLD AUTO: 0.87 X10*3/UL (ref 0.8–3)
LYMPHOCYTES NFR BLD AUTO: 15.9 %
MAGNESIUM SERPL-MCNC: 2.12 MG/DL (ref 1.6–2.4)
MCH RBC QN AUTO: 29.4 PG (ref 26–34)
MCHC RBC AUTO-ENTMCNC: 32.2 G/DL (ref 32–36)
MCV RBC AUTO: 91 FL (ref 80–100)
MONOCYTES # BLD AUTO: 0.7 X10*3/UL (ref 0.05–0.8)
MONOCYTES NFR BLD AUTO: 12.8 %
NEUTROPHILS # BLD AUTO: 3.64 X10*3/UL (ref 1.6–5.5)
NEUTROPHILS NFR BLD AUTO: 66.7 %
NRBC BLD-RTO: 0 /100 WBCS (ref 0–0)
PHOSPHATE SERPL-MCNC: 3 MG/DL (ref 2.5–4.9)
PLATELET # BLD AUTO: 109 X10*3/UL (ref 150–450)
POTASSIUM SERPL-SCNC: 4.3 MMOL/L (ref 3.5–5.3)
PROT SERPL-MCNC: 6.2 G/DL (ref 6.4–8.2)
RBC # BLD AUTO: 4.42 X10*6/UL (ref 4.5–5.9)
SODIUM SERPL-SCNC: 137 MMOL/L (ref 136–145)
URATE SERPL-MCNC: 4.6 MG/DL (ref 4–7.5)
WBC # BLD AUTO: 5.5 X10*3/UL (ref 4.4–11.3)

## 2025-04-25 PROCEDURE — 84550 ASSAY OF BLOOD/URIC ACID: CPT

## 2025-04-25 PROCEDURE — 83615 LACTATE (LD) (LDH) ENZYME: CPT

## 2025-04-25 PROCEDURE — 83735 ASSAY OF MAGNESIUM: CPT

## 2025-04-25 PROCEDURE — 80053 COMPREHEN METABOLIC PANEL: CPT

## 2025-04-25 PROCEDURE — 85025 COMPLETE CBC W/AUTO DIFF WBC: CPT

## 2025-04-25 PROCEDURE — 84100 ASSAY OF PHOSPHORUS: CPT

## 2025-04-28 ENCOUNTER — SPECIALTY PHARMACY (OUTPATIENT)
Dept: PHARMACY | Facility: CLINIC | Age: 85
End: 2025-04-28

## 2025-04-29 ENCOUNTER — OFFICE VISIT (OUTPATIENT)
Dept: HEMATOLOGY/ONCOLOGY | Facility: HOSPITAL | Age: 85
End: 2025-04-29
Payer: MEDICARE

## 2025-04-29 ENCOUNTER — INFUSION (OUTPATIENT)
Dept: HEMATOLOGY/ONCOLOGY | Facility: HOSPITAL | Age: 85
End: 2025-04-29
Payer: MEDICARE

## 2025-04-29 VITALS
OXYGEN SATURATION: 96 % | SYSTOLIC BLOOD PRESSURE: 135 MMHG | TEMPERATURE: 96.8 F | DIASTOLIC BLOOD PRESSURE: 57 MMHG | RESPIRATION RATE: 16 BRPM | HEART RATE: 60 BPM

## 2025-04-29 VITALS
SYSTOLIC BLOOD PRESSURE: 129 MMHG | BODY MASS INDEX: 29 KG/M2 | DIASTOLIC BLOOD PRESSURE: 65 MMHG | OXYGEN SATURATION: 98 % | RESPIRATION RATE: 16 BRPM | HEIGHT: 67 IN | WEIGHT: 184.75 LBS | HEART RATE: 60 BPM | TEMPERATURE: 96.1 F

## 2025-04-29 DIAGNOSIS — C83.00 SMALL LYMPHOCYTIC LYMPHOMA (MULTI): ICD-10-CM

## 2025-04-29 DIAGNOSIS — C83.00 SMALL LYMPHOCYTIC LYMPHOMA (MULTI): Primary | ICD-10-CM

## 2025-04-29 PROCEDURE — 99215 OFFICE O/P EST HI 40 MIN: CPT | Mod: 25 | Performed by: INTERNAL MEDICINE

## 2025-04-29 PROCEDURE — G2211 COMPLEX E/M VISIT ADD ON: HCPCS | Performed by: INTERNAL MEDICINE

## 2025-04-29 PROCEDURE — 99215 OFFICE O/P EST HI 40 MIN: CPT | Performed by: INTERNAL MEDICINE

## 2025-04-29 PROCEDURE — 2500000004 HC RX 250 GENERAL PHARMACY W/ HCPCS (ALT 636 FOR OP/ED): Mod: JZ | Performed by: INTERNAL MEDICINE

## 2025-04-29 PROCEDURE — 96413 CHEMO IV INFUSION 1 HR: CPT

## 2025-04-29 PROCEDURE — 1123F ACP DISCUSS/DSCN MKR DOCD: CPT | Performed by: INTERNAL MEDICINE

## 2025-04-29 PROCEDURE — 96367 TX/PROPH/DG ADDL SEQ IV INF: CPT | Mod: INF,INF2

## 2025-04-29 PROCEDURE — 96415 CHEMO IV INFUSION ADDL HR: CPT

## 2025-04-29 PROCEDURE — 2500000001 HC RX 250 WO HCPCS SELF ADMINISTERED DRUGS (ALT 637 FOR MEDICARE OP): Performed by: INTERNAL MEDICINE

## 2025-04-29 PROCEDURE — 3074F SYST BP LT 130 MM HG: CPT | Performed by: INTERNAL MEDICINE

## 2025-04-29 PROCEDURE — 3078F DIAST BP <80 MM HG: CPT | Performed by: INTERNAL MEDICINE

## 2025-04-29 PROCEDURE — 1126F AMNT PAIN NOTED NONE PRSNT: CPT | Performed by: INTERNAL MEDICINE

## 2025-04-29 PROCEDURE — 1159F MED LIST DOCD IN RCRD: CPT | Performed by: INTERNAL MEDICINE

## 2025-04-29 RX ORDER — FAMOTIDINE 10 MG/ML
20 INJECTION, SOLUTION INTRAVENOUS ONCE AS NEEDED
Status: DISCONTINUED | OUTPATIENT
Start: 2025-04-29 | End: 2025-04-29 | Stop reason: HOSPADM

## 2025-04-29 RX ORDER — DIPHENHYDRAMINE HCL 25 MG
50 CAPSULE ORAL ONCE
Status: COMPLETED | OUTPATIENT
Start: 2025-04-29 | End: 2025-04-29

## 2025-04-29 RX ORDER — PROCHLORPERAZINE EDISYLATE 5 MG/ML
10 INJECTION INTRAMUSCULAR; INTRAVENOUS EVERY 6 HOURS PRN
Status: DISCONTINUED | OUTPATIENT
Start: 2025-04-29 | End: 2025-04-29 | Stop reason: HOSPADM

## 2025-04-29 RX ORDER — PROCHLORPERAZINE MALEATE 5 MG
10 TABLET ORAL EVERY 6 HOURS PRN
OUTPATIENT
Start: 2025-05-27

## 2025-04-29 RX ORDER — EPINEPHRINE 0.3 MG/.3ML
0.3 INJECTION SUBCUTANEOUS EVERY 5 MIN PRN
OUTPATIENT
Start: 2025-05-27

## 2025-04-29 RX ORDER — ALBUTEROL SULFATE 0.83 MG/ML
3 SOLUTION RESPIRATORY (INHALATION) AS NEEDED
Status: DISCONTINUED | OUTPATIENT
Start: 2025-04-29 | End: 2025-04-29 | Stop reason: HOSPADM

## 2025-04-29 RX ORDER — DIPHENHYDRAMINE HYDROCHLORIDE 50 MG/ML
50 INJECTION, SOLUTION INTRAMUSCULAR; INTRAVENOUS AS NEEDED
OUTPATIENT
Start: 2025-05-27

## 2025-04-29 RX ORDER — DIPHENHYDRAMINE HCL 50 MG
50 CAPSULE ORAL ONCE
OUTPATIENT
Start: 2025-05-27

## 2025-04-29 RX ORDER — DIPHENHYDRAMINE HYDROCHLORIDE 50 MG/ML
50 INJECTION, SOLUTION INTRAMUSCULAR; INTRAVENOUS AS NEEDED
Status: DISCONTINUED | OUTPATIENT
Start: 2025-04-29 | End: 2025-04-29 | Stop reason: HOSPADM

## 2025-04-29 RX ORDER — ALBUTEROL SULFATE 0.83 MG/ML
3 SOLUTION RESPIRATORY (INHALATION) AS NEEDED
OUTPATIENT
Start: 2025-05-27

## 2025-04-29 RX ORDER — FAMOTIDINE 10 MG/ML
20 INJECTION, SOLUTION INTRAVENOUS ONCE AS NEEDED
OUTPATIENT
Start: 2025-05-27

## 2025-04-29 RX ORDER — EPINEPHRINE 0.3 MG/.3ML
0.3 INJECTION SUBCUTANEOUS EVERY 5 MIN PRN
Status: DISCONTINUED | OUTPATIENT
Start: 2025-04-29 | End: 2025-04-29 | Stop reason: HOSPADM

## 2025-04-29 RX ORDER — PROCHLORPERAZINE EDISYLATE 5 MG/ML
10 INJECTION INTRAMUSCULAR; INTRAVENOUS EVERY 6 HOURS PRN
OUTPATIENT
Start: 2025-05-27

## 2025-04-29 RX ORDER — PROCHLORPERAZINE MALEATE 10 MG
10 TABLET ORAL EVERY 6 HOURS PRN
Status: DISCONTINUED | OUTPATIENT
Start: 2025-04-29 | End: 2025-04-29 | Stop reason: HOSPADM

## 2025-04-29 RX ORDER — ACETAMINOPHEN 325 MG/1
650 TABLET ORAL ONCE
Status: COMPLETED | OUTPATIENT
Start: 2025-04-29 | End: 2025-04-29

## 2025-04-29 RX ORDER — ACETAMINOPHEN 325 MG/1
650 TABLET ORAL ONCE
OUTPATIENT
Start: 2025-05-27

## 2025-04-29 RX ADMIN — OBINUTUZUMAB 1000 MG: 1000 INJECTION, SOLUTION, CONCENTRATE INTRAVENOUS at 09:30

## 2025-04-29 RX ADMIN — ACETAMINOPHEN 650 MG: 325 TABLET, FILM COATED ORAL at 08:58

## 2025-04-29 RX ADMIN — DEXAMETHASONE SODIUM PHOSPHATE 20 MG: 10 INJECTION INTRAMUSCULAR; INTRAVENOUS at 09:04

## 2025-04-29 RX ADMIN — DIPHENHYDRAMINE HYDROCHLORIDE 50 MG: 25 CAPSULE ORAL at 08:58

## 2025-04-29 SDOH — ECONOMIC STABILITY: GENERAL
WHICH OF THE FOLLOWING WOULD YOU LIKE TO GET CONNECTED TO IN ORDER TO RECEIVE A DISCOUNT OR FOR FREE? (CHOOSE ALL THAT APPLY): NO ASSISTANCE NEEDED

## 2025-04-29 SDOH — HEALTH STABILITY: PHYSICAL HEALTH: ON AVERAGE, HOW MANY DAYS PER WEEK DO YOU ENGAGE IN MODERATE TO STRENUOUS EXERCISE (LIKE A BRISK WALK)?: 6 DAYS

## 2025-04-29 SDOH — HEALTH STABILITY: PHYSICAL HEALTH: ON AVERAGE, HOW MANY MINUTES DO YOU ENGAGE IN EXERCISE AT THIS LEVEL?: 60 MIN

## 2025-04-29 SDOH — ECONOMIC STABILITY: GENERAL
WHICH OF THE FOLLOWING DO YOU KNOW HOW TO USE AND HAVE ACCESS TO EVERY DAY? (CHOOSE ALL THAT APPLY): SMARTPHONE WITH CELLULAR DATA PLAN;DESKTOP COMPUTER, LAPTOP COMPUTER, OR TABLET WITH BROADBAND INTERNET CONNECTION

## 2025-04-29 ASSESSMENT — SOCIAL DETERMINANTS OF HEALTH (SDOH)
HOW OFTEN DO YOU ATTENT MEETINGS OF THE CLUB OR ORGANIZATION YOU BELONG TO?: NEVER
WITHIN THE LAST YEAR, HAVE YOU BEEN HUMILIATED OR EMOTIONALLY ABUSED IN OTHER WAYS BY YOUR PARTNER OR EX-PARTNER?: NO
DO YOU BELONG TO ANY CLUBS OR ORGANIZATIONS SUCH AS CHURCH GROUPS UNIONS, FRATERNAL OR ATHLETIC GROUPS, OR SCHOOL GROUPS?: NO
WITHIN THE LAST YEAR, HAVE YOU BEEN KICKED, HIT, SLAPPED, OR OTHERWISE PHYSICALLY HURT BY YOUR PARTNER OR EX-PARTNER?: NO
WITHIN THE LAST YEAR, HAVE YOU BEEN AFRAID OF YOUR PARTNER OR EX-PARTNER?: NO
WITHIN THE LAST YEAR, HAVE TO BEEN RAPED OR FORCED TO HAVE ANY KIND OF SEXUAL ACTIVITY BY YOUR PARTNER OR EX-PARTNER?: NO
IN A TYPICAL WEEK, HOW MANY TIMES DO YOU TALK ON THE PHONE WITH FAMILY, FRIENDS, OR NEIGHBORS?: ONCE A WEEK
HOW HARD IS IT FOR YOU TO PAY FOR THE VERY BASICS LIKE FOOD, HOUSING, MEDICAL CARE, AND HEATING?: NOT HARD AT ALL
HOW OFTEN DO YOU ATTEND CHURCH OR RELIGIOUS SERVICES?: NEVER
HOW OFTEN DO YOU GET TOGETHER WITH FRIENDS OR RELATIVES?: ONCE A WEEK

## 2025-04-29 ASSESSMENT — ENCOUNTER SYMPTOMS
FATIGUE: 1
CARDIOVASCULAR NEGATIVE: 1
FEVER: 0
GASTROINTESTINAL NEGATIVE: 1
RESPIRATORY NEGATIVE: 1

## 2025-04-29 ASSESSMENT — PATIENT HEALTH QUESTIONNAIRE - PHQ9
2. FEELING DOWN, DEPRESSED OR HOPELESS: NOT AT ALL
1. LITTLE INTEREST OR PLEASURE IN DOING THINGS: NOT AT ALL
SUM OF ALL RESPONSES TO PHQ9 QUESTIONS 1 & 2: 0

## 2025-04-29 ASSESSMENT — PAIN SCALES - GENERAL: PAINLEVEL_OUTOF10: 0-NO PAIN

## 2025-04-29 NOTE — PROGRESS NOTES
Pt aware of need for only weekly lab checks now.  Discharge instructions given and reviewed.  Pt aware to start his oral chemo today, also.

## 2025-04-29 NOTE — SIGNIFICANT EVENT

## 2025-04-29 NOTE — PROGRESS NOTES
"Patient ID: Donavon Brush is a 84 y.o. male.    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   L eye pressure improved somewhat    ONCOLOGY HISTORY:  - Had L eye pressure x 1-2 years. Worsened => CT orbit (Sep 2024): L diffuse retrobulbar soft tissue density  - MR brain/orbit (Dec 2024): diffuse loss of orbital intraconal fat and increased enhancement. Complete encasement of optic nerve without nerve atrophy  - PET/CT (Jan 2025): FDG avid LAPs in L infraclavicular, mediastinal, b/l iliac, and retropertioneal areas. Soft tissue mass posterior to L kidney. Multiple lung nodules  - L supraclavicular lN bx (Feb 2025): Low grade lymphoma. CD5 +, CD23 +. 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  - Started obinutuzumab on 4/1/25. Then venetoclax added on 4/19/25.     Assessment/Plan:  ? 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 Ki67 was 10%. PET reveals uptake in multiple LN, lungs, and possibly a kidney lesion. He required treatment.     We started him on obinutuzumab last month. Tolerating it well. Eye feels somewhat better. Will continue with C2 today. Will start venetoclax starting package today. With the next cycle, we will continue with 200 mg po daily because he is on carvedilol. We will not increase dose to 400 mg.    Check lbas weekly this month.     On coumadin. Will monitor PLT. May need to hold coumadin if plt drops below 50K    Review Of Systems:  Review of Systems   Constitutional:  Positive for fatigue. Negative for fever.   HENT:  Negative.     Respiratory: Negative.     Cardiovascular: Negative.    Gastrointestinal: Negative.    Genitourinary: Negative.         Physical Exam:  /65 (BP Location: Left arm, Patient Position: Sitting, BP Cuff Size: Adult)   Pulse 60   Temp 35.6 °C (96.1 °F) (Temporal)   Resp 16   Ht 1.696 m (5' 6.77\")   Wt 83.8 kg (184 lb 11.9 oz)   SpO2 98%   BMI " 29.13 kg/m²   BSA: 1.99 meters squared  Performance Status: Asymptomatic  Physical Exam  HENT:      Head: Normocephalic and atraumatic.   Eyes:      General: No scleral icterus.  Pulmonary:      Effort: Pulmonary effort is normal.   Musculoskeletal:         General: Normal range of motion.   Skin:     Coloration: Skin is not jaundiced.   Neurological:      General: No focal deficit present.      Mental Status: He is alert and oriented to person, place, and time.         Results:  Diagnostic Results   Lab Results   Component Value Date    WBC 5.5 04/25/2025    HGB 13.0 (L) 04/25/2025    HCT 40.4 (L) 04/25/2025    MCV 91 04/25/2025     (L) 04/25/2025     Lab Results   Component Value Date    CALCIUM 9.2 04/25/2025     04/25/2025    K 4.3 04/25/2025    CO2 28 04/25/2025     04/25/2025    BUN 27 (H) 04/25/2025    CREATININE 1.53 (H) 04/25/2025    ALT 14 04/25/2025    AST 17 04/25/2025       Current Outpatient Medications:     acyclovir (Zovirax) 400 mg tablet, Take 1 tablet (400 mg) by mouth 2 times a day., Disp: 60 tablet, Rfl: 5    allopurinol (Zyloprim) 300 mg tablet, Take 1 tablet (300 mg) by mouth once daily., Disp: 30 tablet, Rfl: 1    carvedilol (Coreg) 25 mg tablet, Take 1 tablet (25 mg) by mouth 2 times daily (morning and late afternoon)., Disp: 180 tablet, Rfl: 3    levothyroxine (Euthyrox) 75 mcg tablet, TAKE ONE TABLET BY MOUTH once DAILY as directed **need lab work**, Disp: 90 tablet, Rfl: 3    neomycin-bacitracin-polymyxin-HC (Cortisporin) 3.5-400-10,000 mg-unit/g-1% ophthalmic ointment, Apply to left eye twice a day for 2 weeks, Disp: 3.5 g, Rfl: 0    ondansetron (Zofran) 8 mg tablet, Take 1 tablet (8 mg) by mouth every 8 hours if needed for nausea or vomiting., Disp: 30 tablet, Rfl: 5    sacubitriL-valsartan (Entresto) 24-26 mg tablet, Take 1 tablet by mouth 2 times a day., Disp: 60 tablet, Rfl: 5    venetoclax (Venclexta) 10 mg-50 mg- 100 mg tablet therapy pack, Take per package  instructions as directed with food. Week 1: Take two 10mg tablets by mouth once daily. Week 2: Take one 50mg tablet once daily. Week 3: Take one 100mg tablet once daily. Week 4: Take two 100mg tablets once daily., Disp: 42 each, Rfl: 0    warfarin (Coumadin) 7.5 mg tablet, Take 1 tablet (7.5 mg) by mouth once daily at bedtime., Disp: 90 tablet, Rfl: 3    erythromycin (Romycin) 5 mg/gram (0.5 %) ophthalmic ointment, Apply to left eye every 6 hours. APPLY 1/2 INCH RIBBON IN THE LEFT EYE UP TO THREE TIMES DAILY (Patient not taking: Reported on 4/29/2025), Disp: 3.5 g, Rfl: 1     Past Surgical History:   Procedure Laterality Date    AORTIC VALVE REPLACEMENT  2003    CARDIAC PACEMAKER PLACEMENT  09/22/2014    Pacemaker Placement    CATARACT EXTRACTION Bilateral     FRACTURE SURGERY      shoulder fracture    KNEE ARTHROPLASTY Left     PROSTATE SURGERY      TURP     Family History   Problem Relation Name Age of Onset    Alzheimer's disease Mother          passed at 84    No Known Problems Father          passed at 66    Heart disease Brother Enrrique     No Known Problems Daughter      No Known Problems Son      No Known Problems Son        reports that he quit smoking about 25 years ago. His smoking use included cigarettes. He has never used smokeless tobacco.    Diagnoses and all orders for this visit:  Small lymphocytic lymphoma (Multi)  -     Clinic Appointment Request  -     CBC and Auto Differential; Standing  -     Comprehensive Metabolic Panel; Standing  -     Uric Acid; Standing         Kael Chris MD

## 2025-05-02 DIAGNOSIS — C83.00 SMALL LYMPHOCYTIC LYMPHOMA (MULTI): ICD-10-CM

## 2025-05-06 ENCOUNTER — LAB (OUTPATIENT)
Dept: LAB | Facility: HOSPITAL | Age: 85
End: 2025-05-06
Payer: MEDICARE

## 2025-05-06 LAB
ALBUMIN SERPL BCP-MCNC: 3.6 G/DL (ref 3.4–5)
ALP SERPL-CCNC: 50 U/L (ref 33–136)
ALT SERPL W P-5'-P-CCNC: 15 U/L (ref 10–52)
ANION GAP SERPL CALC-SCNC: 9 MMOL/L (ref 10–20)
AST SERPL W P-5'-P-CCNC: 17 U/L (ref 9–39)
BASOPHILS # BLD AUTO: 0.02 X10*3/UL (ref 0–0.1)
BASOPHILS NFR BLD AUTO: 0.3 %
BILIRUB SERPL-MCNC: 0.7 MG/DL (ref 0–1.2)
BUN SERPL-MCNC: 25 MG/DL (ref 6–23)
CALCIUM SERPL-MCNC: 9.2 MG/DL (ref 8.6–10.3)
CHLORIDE SERPL-SCNC: 103 MMOL/L (ref 98–107)
CO2 SERPL-SCNC: 31 MMOL/L (ref 21–32)
CREAT SERPL-MCNC: 1.47 MG/DL (ref 0.5–1.3)
EGFRCR SERPLBLD CKD-EPI 2021: 47 ML/MIN/1.73M*2
EOSINOPHIL # BLD AUTO: 0.11 X10*3/UL (ref 0–0.4)
EOSINOPHIL NFR BLD AUTO: 1.6 %
ERYTHROCYTE [DISTWIDTH] IN BLOOD BY AUTOMATED COUNT: 16.4 % (ref 11.5–14.5)
GLUCOSE SERPL-MCNC: 92 MG/DL (ref 74–99)
HCT VFR BLD AUTO: 40.3 % (ref 41–52)
HGB BLD-MCNC: 12.9 G/DL (ref 13.5–17.5)
IMM GRANULOCYTES # BLD AUTO: 0.05 X10*3/UL (ref 0–0.5)
IMM GRANULOCYTES NFR BLD AUTO: 0.7 % (ref 0–0.9)
LYMPHOCYTES # BLD AUTO: 0.83 X10*3/UL (ref 0.8–3)
LYMPHOCYTES NFR BLD AUTO: 11.8 %
MCH RBC QN AUTO: 29.3 PG (ref 26–34)
MCHC RBC AUTO-ENTMCNC: 32 G/DL (ref 32–36)
MCV RBC AUTO: 91 FL (ref 80–100)
MONOCYTES # BLD AUTO: 0.91 X10*3/UL (ref 0.05–0.8)
MONOCYTES NFR BLD AUTO: 13 %
NEUTROPHILS # BLD AUTO: 5.1 X10*3/UL (ref 1.6–5.5)
NEUTROPHILS NFR BLD AUTO: 72.6 %
NRBC BLD-RTO: 0 /100 WBCS (ref 0–0)
PLATELET # BLD AUTO: 106 X10*3/UL (ref 150–450)
POTASSIUM SERPL-SCNC: 4.3 MMOL/L (ref 3.5–5.3)
PROT SERPL-MCNC: 6 G/DL (ref 6.4–8.2)
RBC # BLD AUTO: 4.41 X10*6/UL (ref 4.5–5.9)
SODIUM SERPL-SCNC: 139 MMOL/L (ref 136–145)
URATE SERPL-MCNC: 4.6 MG/DL (ref 4–7.5)
WBC # BLD AUTO: 7 X10*3/UL (ref 4.4–11.3)

## 2025-05-06 PROCEDURE — 85025 COMPLETE CBC W/AUTO DIFF WBC: CPT

## 2025-05-06 PROCEDURE — 80053 COMPREHEN METABOLIC PANEL: CPT

## 2025-05-06 PROCEDURE — 84550 ASSAY OF BLOOD/URIC ACID: CPT

## 2025-05-09 DIAGNOSIS — C83.00 SMALL LYMPHOCYTIC LYMPHOMA (MULTI): ICD-10-CM

## 2025-05-13 ENCOUNTER — LAB (OUTPATIENT)
Dept: LAB | Facility: HOSPITAL | Age: 85
End: 2025-05-13
Payer: MEDICARE

## 2025-05-13 LAB
ALBUMIN SERPL BCP-MCNC: 3.4 G/DL (ref 3.4–5)
ALP SERPL-CCNC: 55 U/L (ref 33–136)
ALT SERPL W P-5'-P-CCNC: 12 U/L (ref 10–52)
ANION GAP SERPL CALC-SCNC: 12 MMOL/L (ref 10–20)
AST SERPL W P-5'-P-CCNC: 16 U/L (ref 9–39)
BASOPHILS # BLD AUTO: 0.02 X10*3/UL (ref 0–0.1)
BASOPHILS NFR BLD AUTO: 0.3 %
BILIRUB SERPL-MCNC: 0.7 MG/DL (ref 0–1.2)
BUN SERPL-MCNC: 27 MG/DL (ref 6–23)
CALCIUM SERPL-MCNC: 8.9 MG/DL (ref 8.6–10.3)
CHLORIDE SERPL-SCNC: 105 MMOL/L (ref 98–107)
CO2 SERPL-SCNC: 25 MMOL/L (ref 21–32)
CREAT SERPL-MCNC: 1.44 MG/DL (ref 0.5–1.3)
EGFRCR SERPLBLD CKD-EPI 2021: 48 ML/MIN/1.73M*2
EOSINOPHIL # BLD AUTO: 0.06 X10*3/UL (ref 0–0.4)
EOSINOPHIL NFR BLD AUTO: 1 %
ERYTHROCYTE [DISTWIDTH] IN BLOOD BY AUTOMATED COUNT: 16.4 % (ref 11.5–14.5)
GLUCOSE SERPL-MCNC: 117 MG/DL (ref 74–99)
HCT VFR BLD AUTO: 36.6 % (ref 41–52)
HGB BLD-MCNC: 11.9 G/DL (ref 13.5–17.5)
IMM GRANULOCYTES # BLD AUTO: 0.02 X10*3/UL (ref 0–0.5)
IMM GRANULOCYTES NFR BLD AUTO: 0.3 % (ref 0–0.9)
LYMPHOCYTES # BLD AUTO: 0.87 X10*3/UL (ref 0.8–3)
LYMPHOCYTES NFR BLD AUTO: 14.6 %
MCH RBC QN AUTO: 29.8 PG (ref 26–34)
MCHC RBC AUTO-ENTMCNC: 32.5 G/DL (ref 32–36)
MCV RBC AUTO: 92 FL (ref 80–100)
MONOCYTES # BLD AUTO: 0.95 X10*3/UL (ref 0.05–0.8)
MONOCYTES NFR BLD AUTO: 15.9 %
NEUTROPHILS # BLD AUTO: 4.05 X10*3/UL (ref 1.6–5.5)
NEUTROPHILS NFR BLD AUTO: 67.9 %
NRBC BLD-RTO: 0 /100 WBCS (ref 0–0)
PLATELET # BLD AUTO: 133 X10*3/UL (ref 150–450)
POTASSIUM SERPL-SCNC: 4.2 MMOL/L (ref 3.5–5.3)
PROT SERPL-MCNC: 5.7 G/DL (ref 6.4–8.2)
RBC # BLD AUTO: 4 X10*6/UL (ref 4.5–5.9)
SODIUM SERPL-SCNC: 138 MMOL/L (ref 136–145)
URATE SERPL-MCNC: 4.8 MG/DL (ref 4–7.5)
WBC # BLD AUTO: 6 X10*3/UL (ref 4.4–11.3)

## 2025-05-13 PROCEDURE — 84550 ASSAY OF BLOOD/URIC ACID: CPT

## 2025-05-13 PROCEDURE — 85025 COMPLETE CBC W/AUTO DIFF WBC: CPT

## 2025-05-13 PROCEDURE — 80053 COMPREHEN METABOLIC PANEL: CPT

## 2025-05-16 ENCOUNTER — APPOINTMENT (OUTPATIENT)
Dept: HEMATOLOGY/ONCOLOGY | Facility: CLINIC | Age: 85
End: 2025-05-16
Payer: MEDICARE

## 2025-05-16 ENCOUNTER — HOSPITAL ENCOUNTER (OUTPATIENT)
Dept: CARDIOLOGY | Facility: CLINIC | Age: 85
Discharge: HOME | End: 2025-05-16
Payer: MEDICARE

## 2025-05-16 DIAGNOSIS — C83.00 SMALL LYMPHOCYTIC LYMPHOMA (MULTI): ICD-10-CM

## 2025-05-16 DIAGNOSIS — I48.0 PAROXYSMAL ATRIAL FIBRILLATION (MULTI): ICD-10-CM

## 2025-05-16 DIAGNOSIS — Z95.0 PRESENCE OF CARDIAC PACEMAKER: ICD-10-CM

## 2025-05-16 DIAGNOSIS — I49.5 TACHYCARDIA-BRADYCARDIA (MULTI): ICD-10-CM

## 2025-05-16 DIAGNOSIS — I35.0 AORTIC VALVE STENOSIS, ETIOLOGY OF CARDIAC VALVE DISEASE UNSPECIFIED: Primary | ICD-10-CM

## 2025-05-16 DIAGNOSIS — I35.0 AORTIC VALVE STENOSIS, ETIOLOGY OF CARDIAC VALVE DISEASE UNSPECIFIED: ICD-10-CM

## 2025-05-16 PROCEDURE — 93306 TTE W/DOPPLER COMPLETE: CPT

## 2025-05-19 PROCEDURE — RXMED WILLOW AMBULATORY MEDICATION CHARGE

## 2025-05-20 ENCOUNTER — PHARMACY VISIT (OUTPATIENT)
Dept: PHARMACY | Facility: CLINIC | Age: 85
End: 2025-05-20
Payer: COMMERCIAL

## 2025-05-20 ENCOUNTER — LAB (OUTPATIENT)
Dept: LAB | Facility: HOSPITAL | Age: 85
End: 2025-05-20
Payer: MEDICARE

## 2025-05-20 ENCOUNTER — APPOINTMENT (OUTPATIENT)
Dept: CARDIOLOGY | Facility: CLINIC | Age: 85
End: 2025-05-20
Payer: MEDICARE

## 2025-05-20 ENCOUNTER — OFFICE VISIT (OUTPATIENT)
Dept: CARDIOLOGY | Facility: CLINIC | Age: 85
End: 2025-05-20
Payer: MEDICARE

## 2025-05-20 VITALS
HEART RATE: 60 BPM | WEIGHT: 185 LBS | SYSTOLIC BLOOD PRESSURE: 158 MMHG | OXYGEN SATURATION: 98 % | BODY MASS INDEX: 29.18 KG/M2 | DIASTOLIC BLOOD PRESSURE: 67 MMHG

## 2025-05-20 DIAGNOSIS — I49.5 TACHYCARDIA-BRADYCARDIA (MULTI): ICD-10-CM

## 2025-05-20 DIAGNOSIS — I10 HYPERTENSION, UNSPECIFIED TYPE: ICD-10-CM

## 2025-05-20 DIAGNOSIS — I48.0 PAROXYSMAL ATRIAL FIBRILLATION (MULTI): Primary | ICD-10-CM

## 2025-05-20 LAB
ALBUMIN SERPL BCP-MCNC: 3.6 G/DL (ref 3.4–5)
ALP SERPL-CCNC: 58 U/L (ref 33–136)
ALT SERPL W P-5'-P-CCNC: 12 U/L (ref 10–52)
ANION GAP SERPL CALC-SCNC: 10 MMOL/L (ref 10–20)
AST SERPL W P-5'-P-CCNC: 17 U/L (ref 9–39)
BASOPHILS # BLD AUTO: 0.02 X10*3/UL (ref 0–0.1)
BASOPHILS NFR BLD AUTO: 0.4 %
BILIRUB SERPL-MCNC: 0.9 MG/DL (ref 0–1.2)
BUN SERPL-MCNC: 22 MG/DL (ref 6–23)
CALCIUM SERPL-MCNC: 9.3 MG/DL (ref 8.6–10.3)
CHLORIDE SERPL-SCNC: 104 MMOL/L (ref 98–107)
CO2 SERPL-SCNC: 30 MMOL/L (ref 21–32)
CREAT SERPL-MCNC: 1.48 MG/DL (ref 0.5–1.3)
EGFRCR SERPLBLD CKD-EPI 2021: 46 ML/MIN/1.73M*2
EOSINOPHIL # BLD AUTO: 0.03 X10*3/UL (ref 0–0.4)
EOSINOPHIL NFR BLD AUTO: 0.6 %
ERYTHROCYTE [DISTWIDTH] IN BLOOD BY AUTOMATED COUNT: 16.8 % (ref 11.5–14.5)
GLUCOSE SERPL-MCNC: 106 MG/DL (ref 74–99)
HCT VFR BLD AUTO: 37.4 % (ref 41–52)
HGB BLD-MCNC: 12.1 G/DL (ref 13.5–17.5)
IMM GRANULOCYTES # BLD AUTO: 0.03 X10*3/UL (ref 0–0.5)
IMM GRANULOCYTES NFR BLD AUTO: 0.6 % (ref 0–0.9)
LYMPHOCYTES # BLD AUTO: 0.89 X10*3/UL (ref 0.8–3)
LYMPHOCYTES NFR BLD AUTO: 18.3 %
MCH RBC QN AUTO: 29.9 PG (ref 26–34)
MCHC RBC AUTO-ENTMCNC: 32.4 G/DL (ref 32–36)
MCV RBC AUTO: 92 FL (ref 80–100)
MONOCYTES # BLD AUTO: 1.04 X10*3/UL (ref 0.05–0.8)
MONOCYTES NFR BLD AUTO: 21.4 %
NEUTROPHILS # BLD AUTO: 2.85 X10*3/UL (ref 1.6–5.5)
NEUTROPHILS NFR BLD AUTO: 58.7 %
NRBC BLD-RTO: 0 /100 WBCS (ref 0–0)
PLATELET # BLD AUTO: 151 X10*3/UL (ref 150–450)
POTASSIUM SERPL-SCNC: 4.3 MMOL/L (ref 3.5–5.3)
PROT SERPL-MCNC: 5.8 G/DL (ref 6.4–8.2)
RBC # BLD AUTO: 4.05 X10*6/UL (ref 4.5–5.9)
SODIUM SERPL-SCNC: 140 MMOL/L (ref 136–145)
URATE SERPL-MCNC: 4.5 MG/DL (ref 4–7.5)
WBC # BLD AUTO: 4.9 X10*3/UL (ref 4.4–11.3)

## 2025-05-20 PROCEDURE — 1036F TOBACCO NON-USER: CPT | Performed by: NURSE PRACTITIONER

## 2025-05-20 PROCEDURE — 3078F DIAST BP <80 MM HG: CPT | Performed by: NURSE PRACTITIONER

## 2025-05-20 PROCEDURE — 85025 COMPLETE CBC W/AUTO DIFF WBC: CPT

## 2025-05-20 PROCEDURE — 99214 OFFICE O/P EST MOD 30 MIN: CPT | Performed by: NURSE PRACTITIONER

## 2025-05-20 PROCEDURE — 84550 ASSAY OF BLOOD/URIC ACID: CPT

## 2025-05-20 PROCEDURE — 1159F MED LIST DOCD IN RCRD: CPT | Performed by: NURSE PRACTITIONER

## 2025-05-20 PROCEDURE — 80053 COMPREHEN METABOLIC PANEL: CPT

## 2025-05-20 PROCEDURE — G2211 COMPLEX E/M VISIT ADD ON: HCPCS | Performed by: NURSE PRACTITIONER

## 2025-05-20 PROCEDURE — 3077F SYST BP >= 140 MM HG: CPT | Performed by: NURSE PRACTITIONER

## 2025-05-20 NOTE — PROGRESS NOTES
Referred by Dr. Roth ref. provider found for Follow-up     History Of Present Illness:    Mr. Brush is a very pleasant 84 year old gentleman with a history of atrial fibrillation (Coumadin), HFrEF (LVEF 40%), AVR, HTN and PPM, he is here for a follow up visit. The patient is seen in collaboration with Dr. Driscoll. Denies chest pain, shortness of breath or heart palpitations. Currently being treated for lymphoma. INR has been fluctuated. Continues to stay active.     Past Medical History:  He has a past medical history of A-fib (Multi), Chronic sinusitis, unspecified (05/11/2017), Coronary artery disease, Disease of thyroid gland, Eye cancer, left (Multi), Hypertension, Hypothyroidism, Hypothyroidism, unspecified (10/12/2022), Other specified disorders of eye and adnexa (05/08/2019), Personal history of other diseases of the respiratory system (04/10/2015), and Personal history of other diseases of the respiratory system (06/14/2016).    Past Surgical History:  He has a past surgical history that includes Cardiac pacemaker placement (09/22/2014); Prostate surgery; Fracture surgery; Aortic valve replacement (2003); Knee Arthroplasty (Left); and Cataract extraction (Bilateral).      Social History:  He reports that he quit smoking about 25 years ago. His smoking use included cigarettes. He has never used smokeless tobacco. He reports current alcohol use of about 7.0 standard drinks of alcohol per week. He reports that he does not use drugs.    Family History:  Family History   Problem Relation Name Age of Onset    Alzheimer's disease Mother          passed at 84    No Known Problems Father          passed at 66    Heart disease Brother Enrrique     No Known Problems Daughter      No Known Problems Son      No Known Problems Son          Allergies:  Patient has no known allergies.    Outpatient Medications:  Current Outpatient Medications   Medication Instructions    acyclovir (ZOVIRAX) 400 mg, oral, 2 times daily     allopurinol (ZYLOPRIM) 300 mg, oral, Daily    carvedilol (COREG) 25 mg, oral, 2 times daily (morning and late afternoon)    erythromycin (Romycin) 5 mg/gram (0.5 %) ophthalmic ointment Left Eye, Every 6 hours, APPLY 1/2 INCH RIBBON IN THE LEFT EYE UP TO THREE TIMES DAILY    levothyroxine (Euthyrox) 75 mcg tablet TAKE ONE TABLET BY MOUTH once DAILY as directed **need lab work**    neomycin-bacitracin-polymyxin-HC (Cortisporin) 3.5-400-10,000 mg-unit/g-1% ophthalmic ointment Apply to left eye twice a day for 2 weeks    obinutuzumab (Gazyva) IV in 100 mL  mg, Every 30 days    ondansetron (ZOFRAN) 8 mg, oral, Every 8 hours PRN    sacubitriL-valsartan (Entresto) 24-26 mg tablet 1 tablet, oral, 2 times daily    Venclexta 200 mg, oral, Daily, Take with food.    warfarin (COUMADIN) 7.5 mg, oral, Nightly        Last Recorded Vitals:  Vitals:    05/20/25 1416   BP: 158/67   Pulse: 60   SpO2: 98%   Weight: 83.9 kg (185 lb)       Physical Exam:  Physical Exam  Vitals reviewed.   HENT:      Head: Normocephalic.      Nose: Nose normal.   Eyes:      Pupils: Pupils are equal, round, and reactive to light.   Cardiovascular:      Rate and Rhythm: Normal rate and regular rhythm. Soft systolic murmur   Pulmonary:      Effort: Pulmonary effort is normal.      Breath sounds: Normal breath sounds.   Abdominal:      General: Abdomen is flat.      Palpations: Abdomen is soft.   Musculoskeletal:         General: Normal range of motion.      Cervical back: Normal range of motion.   Skin:     General: Skin is warm and dry.   Neurological:      General: No focal deficit present.      Mental Status: He is alert and oriented to person, place, and time.   Psychiatric:         Mood and Affect: Mood normal.            Last Labs:  CBC -  Lab Results   Component Value Date    WBC 4.9 05/20/2025    HGB 12.1 (L) 05/20/2025    HCT 37.4 (L) 05/20/2025    MCV 92 05/20/2025     05/20/2025       CMP -  Lab Results   Component Value Date     "CALCIUM 9.3 05/20/2025    PHOS 3.0 04/25/2025    PROT 5.8 (L) 05/20/2025    ALBUMIN 3.6 05/20/2025    AST 17 05/20/2025    ALT 12 05/20/2025    ALKPHOS 58 05/20/2025    BILITOT 0.9 05/20/2025       LIPID PANEL -   Lab Results   Component Value Date    CHOL 193 09/25/2023    TRIG 184 (H) 09/25/2023    HDL 28.8 (A) 09/25/2023    CHHDL 6.7 (A) 09/25/2023    LDLF 127 (H) 09/25/2023    VLDL 37 09/25/2023    NHDL 185 09/23/2021       RENAL FUNCTION PANEL -   Lab Results   Component Value Date    GLUCOSE 106 (H) 05/20/2025     05/20/2025    K 4.3 05/20/2025     05/20/2025    CO2 30 05/20/2025    ANIONGAP 10 05/20/2025    BUN 22 05/20/2025    CREATININE 1.48 (H) 05/20/2025    GFRMALE 37 (A) 09/25/2023    CALCIUM 9.3 05/20/2025    PHOS 3.0 04/25/2025    ALBUMIN 3.6 05/20/2025        No results found for: \"BNP\", \"HGBA1C\"    Last Cardiology Tests:  ECG:    Echo:  Echocardiogram 6/2/2022  moderate left ventricle dysfunction estimated fraction 40%     normal RV function  pacemaker wire in the right ventricle  Normal  dimensions of the left ventricle and right ventricle  Moderately to severely dilated left atrium / moderately dilated right  atrium  Posterior mitral valve annular calcification  The proximal ascending aorta measured up to 3.7 centimeter  Trace to mild mitral regurgitation  tricuspid valve Normal structurally   bioprosthetic valve in the aortic position is seated well.  No aortic  regurgitation V max 1.1 m/sec,      calculated ESTHER 2.6 cm2,p/m gradient 5/2mmHg on 2 D images  excursions appears normal.  No pericardial effusion  LVEF 40%    Cath:    Stress Test:    Cardiac Imaging:      Assessment/Plan   Mr. Brush is a very pleasant 84 year old gentleman with a history of chronic atrial fibrillation (Coumadin), HFrEF (LVEF 40%), AVR, HTN and PPM, he is here to establish cardiac care. Denies chest pain or shortness of breath. Continues to stay active on a regular basis. Heart rate and blood pressure are " well controlled at today's office visit    Plan   -call with any questions   -follow up in six months   -continue Coumadin, currently monitors at home   -continue Carvedilol 25 mg twice a day and Entresto 24-26 mg twice a day  -will call to review the echo      SABINA Spears-CNP

## 2025-05-21 LAB
AORTIC VALVE MEAN GRADIENT: 5 MMHG
AORTIC VALVE PEAK VELOCITY: 1.55 M/S
AV PEAK GRADIENT: 10 MMHG
AVA (PEAK VEL): 3.28 CM2
AVA (VTI): 3.07 CM2
EJECTION FRACTION APICAL 4 CHAMBER: 50.8
EJECTION FRACTION: 55 %
LEFT ATRIUM VOLUME AREA LENGTH INDEX BSA: 80.5 ML/M2
LEFT VENTRICLE INTERNAL DIMENSION DIASTOLE: 5 CM (ref 3.5–6)
LEFT VENTRICULAR OUTFLOW TRACT DIAMETER: 2.5 CM
RIGHT VENTRICLE FREE WALL PEAK S': 7.8 CM/S
RIGHT VENTRICLE PEAK SYSTOLIC PRESSURE: 49.9 MMHG
TRICUSPID ANNULAR PLANE SYSTOLIC EXCURSION: 0.9 CM

## 2025-05-22 ENCOUNTER — DOCUMENTATION (OUTPATIENT)
Facility: CLINIC | Age: 85
End: 2025-05-22
Payer: MEDICARE

## 2025-05-22 ENCOUNTER — ANCILLARY PROCEDURE (OUTPATIENT)
Facility: CLINIC | Age: 85
End: 2025-05-22
Payer: MEDICARE

## 2025-05-22 DIAGNOSIS — Z95.0 PRESENCE OF CARDIAC PACEMAKER: ICD-10-CM

## 2025-05-22 DIAGNOSIS — I48.0 PAROXYSMAL ATRIAL FIBRILLATION (MULTI): Primary | ICD-10-CM

## 2025-05-22 DIAGNOSIS — I44.2 COMPLETE HEART BLOCK: Primary | ICD-10-CM

## 2025-05-22 DIAGNOSIS — I48.0 PAROXYSMAL ATRIAL FIBRILLATION (MULTI): ICD-10-CM

## 2025-05-22 PROCEDURE — 93280 PM DEVICE PROGR EVAL DUAL: CPT

## 2025-05-23 ENCOUNTER — TELEPHONE (OUTPATIENT)
Dept: CARDIOLOGY | Facility: HOSPITAL | Age: 85
End: 2025-05-23
Payer: MEDICARE

## 2025-05-23 NOTE — TELEPHONE ENCOUNTER
----- Message from Jennie Olmstead sent at 5/21/2025 12:01 PM EDT -----  Please call echo shows a strong heart valve looks good   Mitral valve is a little stiff will continue to monitor   Thanks   ----- Message -----  From: Laury, Syngo - Cardiology Results In  Sent: 5/21/2025  11:40 AM EDT  To: Jennie Olmstead, APRN-CNP

## 2025-05-27 ENCOUNTER — APPOINTMENT (OUTPATIENT)
Dept: HEMATOLOGY/ONCOLOGY | Facility: HOSPITAL | Age: 85
End: 2025-05-27
Payer: MEDICARE

## 2025-05-27 ENCOUNTER — OFFICE VISIT (OUTPATIENT)
Dept: HEMATOLOGY/ONCOLOGY | Facility: HOSPITAL | Age: 85
End: 2025-05-27
Payer: MEDICARE

## 2025-05-27 VITALS
WEIGHT: 186.18 LBS | RESPIRATION RATE: 16 BRPM | TEMPERATURE: 96.4 F | OXYGEN SATURATION: 98 % | SYSTOLIC BLOOD PRESSURE: 125 MMHG | HEART RATE: 90 BPM | DIASTOLIC BLOOD PRESSURE: 66 MMHG | HEIGHT: 67 IN | BODY MASS INDEX: 29.22 KG/M2

## 2025-05-27 VITALS
SYSTOLIC BLOOD PRESSURE: 107 MMHG | DIASTOLIC BLOOD PRESSURE: 61 MMHG | HEART RATE: 90 BPM | TEMPERATURE: 97 F | OXYGEN SATURATION: 95 % | RESPIRATION RATE: 17 BRPM

## 2025-05-27 DIAGNOSIS — C83.00 SMALL LYMPHOCYTIC LYMPHOMA (MULTI): Primary | ICD-10-CM

## 2025-05-27 DIAGNOSIS — C83.00 SMALL LYMPHOCYTIC LYMPHOMA (MULTI): ICD-10-CM

## 2025-05-27 LAB
ALBUMIN SERPL BCP-MCNC: 3.8 G/DL (ref 3.4–5)
ALP SERPL-CCNC: 51 U/L (ref 33–136)
ALT SERPL W P-5'-P-CCNC: 12 U/L (ref 10–52)
ANION GAP SERPL CALC-SCNC: 7 MMOL/L (ref 10–20)
AST SERPL W P-5'-P-CCNC: 17 U/L (ref 9–39)
BASOPHILS # BLD AUTO: 0.02 X10*3/UL (ref 0–0.1)
BASOPHILS NFR BLD AUTO: 0.5 %
BILIRUB SERPL-MCNC: 0.9 MG/DL (ref 0–1.2)
BUN SERPL-MCNC: 29 MG/DL (ref 6–23)
CALCIUM SERPL-MCNC: 9.2 MG/DL (ref 8.6–10.3)
CHLORIDE SERPL-SCNC: 104 MMOL/L (ref 98–107)
CO2 SERPL-SCNC: 30 MMOL/L (ref 21–32)
CREAT SERPL-MCNC: 1.33 MG/DL (ref 0.5–1.3)
EGFRCR SERPLBLD CKD-EPI 2021: 53 ML/MIN/1.73M*2
EOSINOPHIL # BLD AUTO: 0.06 X10*3/UL (ref 0–0.4)
EOSINOPHIL NFR BLD AUTO: 1.5 %
ERYTHROCYTE [DISTWIDTH] IN BLOOD BY AUTOMATED COUNT: 16.9 % (ref 11.5–14.5)
GLUCOSE SERPL-MCNC: 111 MG/DL (ref 74–99)
HCT VFR BLD AUTO: 39.3 % (ref 41–52)
HGB BLD-MCNC: 12.8 G/DL (ref 13.5–17.5)
IMM GRANULOCYTES # BLD AUTO: 0.03 X10*3/UL (ref 0–0.5)
IMM GRANULOCYTES NFR BLD AUTO: 0.7 % (ref 0–0.9)
INR PPP: 2.5 (ref 0.9–1.1)
LDH SERPL L TO P-CCNC: 204 U/L (ref 84–246)
LYMPHOCYTES # BLD AUTO: 0.83 X10*3/UL (ref 0.8–3)
LYMPHOCYTES NFR BLD AUTO: 20.4 %
MAGNESIUM SERPL-MCNC: 2.02 MG/DL (ref 1.6–2.4)
MCH RBC QN AUTO: 30 PG (ref 26–34)
MCHC RBC AUTO-ENTMCNC: 32.6 G/DL (ref 32–36)
MCV RBC AUTO: 92 FL (ref 80–100)
MONOCYTES # BLD AUTO: 0.98 X10*3/UL (ref 0.05–0.8)
MONOCYTES NFR BLD AUTO: 24.1 %
NEUTROPHILS # BLD AUTO: 2.14 X10*3/UL (ref 1.6–5.5)
NEUTROPHILS NFR BLD AUTO: 52.8 %
NRBC BLD-RTO: 0 /100 WBCS (ref 0–0)
PLATELET # BLD AUTO: 124 X10*3/UL (ref 150–450)
POTASSIUM SERPL-SCNC: 4.1 MMOL/L (ref 3.5–5.3)
PROT SERPL-MCNC: 6.3 G/DL (ref 6.4–8.2)
PROTHROMBIN TIME: 27.7 SECONDS (ref 9.8–12.4)
RBC # BLD AUTO: 4.26 X10*6/UL (ref 4.5–5.9)
SODIUM SERPL-SCNC: 137 MMOL/L (ref 136–145)
URATE SERPL-MCNC: 4.9 MG/DL (ref 4–7.5)
WBC # BLD AUTO: 4.1 X10*3/UL (ref 4.4–11.3)

## 2025-05-27 PROCEDURE — 83615 LACTATE (LD) (LDH) ENZYME: CPT

## 2025-05-27 PROCEDURE — 85610 PROTHROMBIN TIME: CPT | Performed by: INTERNAL MEDICINE

## 2025-05-27 PROCEDURE — 1159F MED LIST DOCD IN RCRD: CPT | Performed by: INTERNAL MEDICINE

## 2025-05-27 PROCEDURE — 96413 CHEMO IV INFUSION 1 HR: CPT

## 2025-05-27 PROCEDURE — 2500000001 HC RX 250 WO HCPCS SELF ADMINISTERED DRUGS (ALT 637 FOR MEDICARE OP): Performed by: INTERNAL MEDICINE

## 2025-05-27 PROCEDURE — 2500000004 HC RX 250 GENERAL PHARMACY W/ HCPCS (ALT 636 FOR OP/ED): Mod: JZ | Performed by: INTERNAL MEDICINE

## 2025-05-27 PROCEDURE — 85025 COMPLETE CBC W/AUTO DIFF WBC: CPT

## 2025-05-27 PROCEDURE — 99215 OFFICE O/P EST HI 40 MIN: CPT | Performed by: INTERNAL MEDICINE

## 2025-05-27 PROCEDURE — 1126F AMNT PAIN NOTED NONE PRSNT: CPT | Performed by: INTERNAL MEDICINE

## 2025-05-27 PROCEDURE — 3078F DIAST BP <80 MM HG: CPT | Performed by: INTERNAL MEDICINE

## 2025-05-27 PROCEDURE — 83735 ASSAY OF MAGNESIUM: CPT | Performed by: INTERNAL MEDICINE

## 2025-05-27 PROCEDURE — 3074F SYST BP LT 130 MM HG: CPT | Performed by: INTERNAL MEDICINE

## 2025-05-27 PROCEDURE — 84550 ASSAY OF BLOOD/URIC ACID: CPT

## 2025-05-27 PROCEDURE — G2211 COMPLEX E/M VISIT ADD ON: HCPCS | Performed by: INTERNAL MEDICINE

## 2025-05-27 PROCEDURE — 96415 CHEMO IV INFUSION ADDL HR: CPT

## 2025-05-27 PROCEDURE — 96367 TX/PROPH/DG ADDL SEQ IV INF: CPT | Mod: INF,INF2

## 2025-05-27 PROCEDURE — 80053 COMPREHEN METABOLIC PANEL: CPT | Performed by: INTERNAL MEDICINE

## 2025-05-27 RX ORDER — PROCHLORPERAZINE MALEATE 10 MG
10 TABLET ORAL EVERY 6 HOURS PRN
Status: DISCONTINUED | OUTPATIENT
Start: 2025-05-27 | End: 2025-05-27 | Stop reason: HOSPADM

## 2025-05-27 RX ORDER — ALBUTEROL SULFATE 0.83 MG/ML
3 SOLUTION RESPIRATORY (INHALATION) AS NEEDED
Status: DISCONTINUED | OUTPATIENT
Start: 2025-05-27 | End: 2025-05-27 | Stop reason: HOSPADM

## 2025-05-27 RX ORDER — EPINEPHRINE 0.3 MG/.3ML
0.3 INJECTION SUBCUTANEOUS EVERY 5 MIN PRN
Status: DISCONTINUED | OUTPATIENT
Start: 2025-05-27 | End: 2025-05-27 | Stop reason: HOSPADM

## 2025-05-27 RX ORDER — DIPHENHYDRAMINE HYDROCHLORIDE 50 MG/ML
50 INJECTION, SOLUTION INTRAMUSCULAR; INTRAVENOUS AS NEEDED
Status: DISCONTINUED | OUTPATIENT
Start: 2025-05-27 | End: 2025-05-27 | Stop reason: HOSPADM

## 2025-05-27 RX ORDER — ACETAMINOPHEN 325 MG/1
650 TABLET ORAL ONCE
Status: COMPLETED | OUTPATIENT
Start: 2025-05-27 | End: 2025-05-27

## 2025-05-27 RX ORDER — PROCHLORPERAZINE EDISYLATE 5 MG/ML
10 INJECTION INTRAMUSCULAR; INTRAVENOUS EVERY 6 HOURS PRN
Status: DISCONTINUED | OUTPATIENT
Start: 2025-05-27 | End: 2025-05-27 | Stop reason: HOSPADM

## 2025-05-27 RX ORDER — FAMOTIDINE 10 MG/ML
20 INJECTION, SOLUTION INTRAVENOUS ONCE AS NEEDED
Status: DISCONTINUED | OUTPATIENT
Start: 2025-05-27 | End: 2025-05-27 | Stop reason: HOSPADM

## 2025-05-27 RX ORDER — DIPHENHYDRAMINE HCL 25 MG
50 CAPSULE ORAL ONCE
Status: COMPLETED | OUTPATIENT
Start: 2025-05-27 | End: 2025-05-27

## 2025-05-27 RX ADMIN — DEXAMETHASONE SODIUM PHOSPHATE 20 MG: 10 INJECTION, SOLUTION INTRAMUSCULAR; INTRAVENOUS at 09:04

## 2025-05-27 RX ADMIN — ACETAMINOPHEN 650 MG: 325 TABLET, FILM COATED ORAL at 09:04

## 2025-05-27 RX ADMIN — DIPHENHYDRAMINE HYDROCHLORIDE 50 MG: 25 CAPSULE ORAL at 09:04

## 2025-05-27 RX ADMIN — OBINUTUZUMAB 1000 MG: 1000 INJECTION, SOLUTION, CONCENTRATE INTRAVENOUS at 09:35

## 2025-05-27 ASSESSMENT — ENCOUNTER SYMPTOMS
CARDIOVASCULAR NEGATIVE: 1
RESPIRATORY NEGATIVE: 1
GASTROINTESTINAL NEGATIVE: 1
FATIGUE: 1
FEVER: 0

## 2025-05-27 ASSESSMENT — PAIN SCALES - GENERAL: PAINLEVEL_OUTOF10: 0-NO PAIN

## 2025-05-27 NOTE — TELEPHONE ENCOUNTER
Per Dr Vallecillo Pt to be scheduled with Dr Shane Chris on March 21st.  Eli Rossi updated. Pt notified that appt will be changed.    normal

## 2025-05-27 NOTE — SIGNIFICANT EVENT

## 2025-05-27 NOTE — PROGRESS NOTES
"Patient ID: Donavon Brush is a 84 y.o. male.    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   L eye pressure improved somewhat. Started venetoclax and doing well.   Denies B symptoms.     ONCOLOGY HISTORY:  - Had L eye pressure x 1-2 years. Worsened => CT orbit (Sep 2024): L diffuse retrobulbar soft tissue density  - MR brain/orbit (Dec 2024): diffuse loss of orbital intraconal fat and increased enhancement. Complete encasement of optic nerve without nerve atrophy  - PET/CT (Jan 2025): FDG avid LAPs in L infraclavicular, mediastinal, b/l iliac, and retropertioneal areas. Soft tissue mass posterior to L kidney. Multiple lung nodules  - L supraclavicular lN bx (Feb 2025): Low grade lymphoma. CD5 +, CD23 +. 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  - Started obinutuzumab on 4/1/25. Then venetoclax added on 4/19/25.     Assessment/Plan:  ? 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 Ki67 was 10%. PET reveals uptake in multiple LN, lungs, and possibly a kidney lesion. He required treatment.     We started him on obinutuzumab last month. Tolerating it well. Eye feels somewhat better. Will continue with C3 today. Dose escalated venetoclax to 200 mg and will continue with the same dose due to carvedilol.   Will check labs monthly from now on.    Plan to repeat PET/CT in July to assess response.    On coumadin. Will monitor PLT. May need to hold coumadin if plt drops below 50K    Review Of Systems:  Review of Systems   Constitutional:  Positive for fatigue. Negative for fever.   HENT:  Negative.     Respiratory: Negative.     Cardiovascular: Negative.    Gastrointestinal: Negative.    Genitourinary: Negative.         Physical Exam:  /66 (BP Location: Right arm, Patient Position: Sitting, BP Cuff Size: Adult)   Pulse 90   Temp 35.8 °C (96.4 °F) (Temporal)   Resp 16   Ht 1.696 m (5' 6.77\")   Wt 84.5 kg " (186 lb 2.9 oz)   SpO2 98%   BMI 29.36 kg/m²   BSA: 2 meters squared  Performance Status: Asymptomatic  Physical Exam  HENT:      Head: Normocephalic and atraumatic.   Eyes:      General: No scleral icterus.  Pulmonary:      Effort: Pulmonary effort is normal.   Musculoskeletal:         General: Normal range of motion.   Skin:     Coloration: Skin is not jaundiced.   Neurological:      General: No focal deficit present.      Mental Status: He is alert and oriented to person, place, and time.         Results:  Diagnostic Results   Lab Results   Component Value Date    WBC 4.9 05/20/2025    HGB 12.1 (L) 05/20/2025    HCT 37.4 (L) 05/20/2025    MCV 92 05/20/2025     05/20/2025     Lab Results   Component Value Date    CALCIUM 9.3 05/20/2025     05/20/2025    K 4.3 05/20/2025    CO2 30 05/20/2025     05/20/2025    BUN 22 05/20/2025    CREATININE 1.48 (H) 05/20/2025    ALT 12 05/20/2025    AST 17 05/20/2025       Current Outpatient Medications:     acyclovir (Zovirax) 400 mg tablet, Take 1 tablet (400 mg) by mouth 2 times a day., Disp: 60 tablet, Rfl: 5    allopurinol (Zyloprim) 300 mg tablet, Take 1 tablet (300 mg) by mouth once daily., Disp: 30 tablet, Rfl: 1    carvedilol (Coreg) 25 mg tablet, Take 1 tablet (25 mg) by mouth 2 times daily (morning and late afternoon)., Disp: 180 tablet, Rfl: 3    erythromycin (Romycin) 5 mg/gram (0.5 %) ophthalmic ointment, Apply to left eye every 6 hours. APPLY 1/2 INCH RIBBON IN THE LEFT EYE UP TO THREE TIMES DAILY, Disp: 3.5 g, Rfl: 1    levothyroxine (Euthyrox) 75 mcg tablet, TAKE ONE TABLET BY MOUTH once DAILY as directed **need lab work**, Disp: 90 tablet, Rfl: 3    neomycin-bacitracin-polymyxin-HC (Cortisporin) 3.5-400-10,000 mg-unit/g-1% ophthalmic ointment, Apply to left eye twice a day for 2 weeks, Disp: 3.5 g, Rfl: 0    obinutuzumab (Gazyva) IV in 100 mL NS, Infuse 100 mg into a venous catheter every 30 (thirty) days., Disp: , Rfl:     ondansetron  (Zofran) 8 mg tablet, Take 1 tablet (8 mg) by mouth every 8 hours if needed for nausea or vomiting., Disp: 30 tablet, Rfl: 5    sacubitriL-valsartan (Entresto) 24-26 mg tablet, Take 1 tablet by mouth 2 times a day., Disp: 60 tablet, Rfl: 5    venetoclax (Venclexta) 100 mg tablet, Take 2 tablets (200 mg total) by mouth once daily.  Take with food., Disp: 56 tablet, Rfl: 3    warfarin (Coumadin) 7.5 mg tablet, Take 1 tablet (7.5 mg) by mouth once daily at bedtime., Disp: 90 tablet, Rfl: 3     Past Surgical History:   Procedure Laterality Date    AORTIC VALVE REPLACEMENT  2003    CARDIAC PACEMAKER PLACEMENT  09/22/2014    Pacemaker Placement    CATARACT EXTRACTION Bilateral     FRACTURE SURGERY      shoulder fracture    KNEE ARTHROPLASTY Left     PROSTATE SURGERY      TURP     Family History   Problem Relation Name Age of Onset    Alzheimer's disease Mother          passed at 84    No Known Problems Father          passed at 66    Heart disease Brother Enrrique     No Known Problems Daughter      No Known Problems Son      No Known Problems Son        reports that he quit smoking about 25 years ago. His smoking use included cigarettes. He has never used smokeless tobacco.    Diagnoses and all orders for this visit:  Small lymphocytic lymphoma (Multi)  -     Clinic Appointment Request  -     Clinic Appointment Request; Future  -     NM PET CT bone skull base to mid thigh; Future         Kael Chris MD

## 2025-05-30 RX ORDER — HEPARIN 100 UNIT/ML
500 SYRINGE INTRAVENOUS AS NEEDED
OUTPATIENT
Start: 2025-05-30

## 2025-05-30 RX ORDER — EPINEPHRINE 0.3 MG/.3ML
0.3 INJECTION SUBCUTANEOUS EVERY 5 MIN PRN
OUTPATIENT
Start: 2025-06-24

## 2025-05-30 RX ORDER — HEPARIN SODIUM,PORCINE/PF 10 UNIT/ML
50 SYRINGE (ML) INTRAVENOUS AS NEEDED
OUTPATIENT
Start: 2025-05-30

## 2025-05-30 RX ORDER — ACETAMINOPHEN 325 MG/1
650 TABLET ORAL ONCE
OUTPATIENT
Start: 2025-06-24

## 2025-05-30 RX ORDER — PROCHLORPERAZINE MALEATE 5 MG
10 TABLET ORAL EVERY 6 HOURS PRN
OUTPATIENT
Start: 2025-06-24

## 2025-05-30 RX ORDER — FAMOTIDINE 10 MG/ML
20 INJECTION, SOLUTION INTRAVENOUS ONCE AS NEEDED
OUTPATIENT
Start: 2025-06-24

## 2025-05-30 RX ORDER — DIPHENHYDRAMINE HCL 50 MG
50 CAPSULE ORAL ONCE
OUTPATIENT
Start: 2025-06-24

## 2025-05-30 RX ORDER — DIPHENHYDRAMINE HYDROCHLORIDE 50 MG/ML
50 INJECTION, SOLUTION INTRAMUSCULAR; INTRAVENOUS AS NEEDED
OUTPATIENT
Start: 2025-06-24

## 2025-05-30 RX ORDER — PROCHLORPERAZINE EDISYLATE 5 MG/ML
10 INJECTION INTRAMUSCULAR; INTRAVENOUS EVERY 6 HOURS PRN
OUTPATIENT
Start: 2025-06-24

## 2025-05-30 RX ORDER — ALBUTEROL SULFATE 0.83 MG/ML
3 SOLUTION RESPIRATORY (INHALATION) AS NEEDED
OUTPATIENT
Start: 2025-06-24

## 2025-06-04 LAB
ALBUMIN SERPL-MCNC: 3.8 G/DL (ref 3.6–5.1)
ALP SERPL-CCNC: 53 U/L (ref 35–144)
ALT SERPL-CCNC: 12 U/L (ref 9–46)
ANION GAP SERPL CALCULATED.4IONS-SCNC: 5 MMOL/L (CALC) (ref 7–17)
AST SERPL-CCNC: 16 U/L (ref 10–35)
BILIRUB SERPL-MCNC: 1.1 MG/DL (ref 0.2–1.2)
BUN SERPL-MCNC: 27 MG/DL (ref 7–25)
CALCIUM SERPL-MCNC: 8.9 MG/DL (ref 8.6–10.3)
CHLORIDE SERPL-SCNC: 105 MMOL/L (ref 98–110)
CO2 SERPL-SCNC: 31 MMOL/L (ref 20–32)
CREAT SERPL-MCNC: 1.48 MG/DL (ref 0.7–1.22)
EGFRCR SERPLBLD CKD-EPI 2021: 46 ML/MIN/1.73M2
ERYTHROCYTE [DISTWIDTH] IN BLOOD BY AUTOMATED COUNT: 15.6 % (ref 11–15)
GLUCOSE SERPL-MCNC: 105 MG/DL (ref 65–99)
HCT VFR BLD AUTO: 40 % (ref 38.5–50)
HGB BLD-MCNC: 12.8 G/DL (ref 13.2–17.1)
INR PPP: 2.8
MAGNESIUM SERPL-MCNC: 2.3 MG/DL (ref 1.5–2.5)
MCH RBC QN AUTO: 30 PG (ref 27–33)
MCHC RBC AUTO-ENTMCNC: 32 G/DL (ref 32–36)
MCV RBC AUTO: 93.7 FL (ref 80–100)
PLATELET # BLD AUTO: 78 THOUSAND/UL (ref 140–400)
PMV BLD REES-ECKER: 11.2 FL (ref 7.5–12.5)
POTASSIUM SERPL-SCNC: 4.9 MMOL/L (ref 3.5–5.3)
PROT SERPL-MCNC: 5.8 G/DL (ref 6.1–8.1)
PROTHROMBIN TIME: 27.4 SEC (ref 9–11.5)
RBC # BLD AUTO: 4.27 MILLION/UL (ref 4.2–5.8)
SODIUM SERPL-SCNC: 141 MMOL/L (ref 135–146)
WBC # BLD AUTO: 4.3 THOUSAND/UL (ref 3.8–10.8)

## 2025-06-05 DIAGNOSIS — I48.11 LONGSTANDING PERSISTENT ATRIAL FIBRILLATION (MULTI): ICD-10-CM

## 2025-06-06 RX ORDER — WARFARIN 7.5 MG/1
7.5 TABLET ORAL NIGHTLY
Qty: 90 TABLET | Refills: 0 | Status: SHIPPED | OUTPATIENT
Start: 2025-06-06

## 2025-06-06 RX ORDER — CARVEDILOL 25 MG/1
TABLET ORAL
Qty: 180 TABLET | Refills: 0 | Status: SHIPPED | OUTPATIENT
Start: 2025-06-06

## 2025-06-12 ENCOUNTER — SPECIALTY PHARMACY (OUTPATIENT)
Dept: PHARMACY | Facility: CLINIC | Age: 85
End: 2025-06-12

## 2025-06-12 PROCEDURE — RXMED WILLOW AMBULATORY MEDICATION CHARGE

## 2025-06-13 ENCOUNTER — PHARMACY VISIT (OUTPATIENT)
Dept: PHARMACY | Facility: CLINIC | Age: 85
End: 2025-06-13
Payer: COMMERCIAL

## 2025-06-18 NOTE — H&P
History Of Present Illness  Donavon Brush is a 84 y.o. male presenting with history of complete heart block/HFrEF s/p PPM in situ. Device placed 5/25/2012. Device has reached end of life. Here for generator change out. PMH includes atrial fib, HFrEF, AVR, HTN, complete heart block, CKD3, hypothyroid, lymphoma, eye cancer.    (5/2025) EF 55%, prior EF from 2022 40%.   Past Medical History:  Medical History[1]     Past Surgical History:  Surgical History[2]       Social History:  Social History[3]    Family History:  Family History[4]     Allergies:  RX Allergies[5]     Home Medications:  Current Outpatient Medications   Medication Instructions    acyclovir (ZOVIRAX) 400 mg, oral, 2 times daily    allopurinol (ZYLOPRIM) 300 mg, oral, Daily    carvedilol (Coreg) 25 mg tablet TAKE ONE TABLET BY MOUTH TWO TIMES A DAY, IN THE MORNING AND LATE AFTERNOON.    erythromycin (Romycin) 5 mg/gram (0.5 %) ophthalmic ointment Left Eye, Every 6 hours, APPLY 1/2 INCH RIBBON IN THE LEFT EYE UP TO THREE TIMES DAILY    levothyroxine (Euthyrox) 75 mcg tablet TAKE ONE TABLET BY MOUTH once DAILY as directed **need lab work**    neomycin-bacitracin-polymyxin-HC (Cortisporin) 3.5-400-10,000 mg-unit/g-1% ophthalmic ointment Apply to left eye twice a day for 2 weeks    obinutuzumab (Gazyva) IV in 100 mL  mg, Every 30 days    ondansetron (ZOFRAN) 8 mg, oral, Every 8 hours PRN    sacubitriL-valsartan (Entresto) 24-26 mg tablet 1 tablet, oral, 2 times daily    Venclexta 200 mg, oral, Daily, Take with food.    warfarin (COUMADIN) 7.5 mg, oral, Nightly       Inpatient Medications:  Scheduled Medications[6]  PRN Medications[7]  Continuous Medications[8]      Review of Systems   Constitutional: Negative.    HENT: Negative.     Eyes: Negative.    Respiratory: Negative.     Cardiovascular: Negative.    Gastrointestinal: Negative.    Endocrine: Negative.    Genitourinary: Negative.    Musculoskeletal: Negative.    Skin: Negative.   "  Allergic/Immunologic: Negative.    Neurological: Negative.    Hematological: Negative.    Psychiatric/Behavioral: Negative.            Physical Exam  Constitutional:       General: He is awake. He is not in acute distress.     Appearance: Normal appearance. He is not ill-appearing.   HENT:      Head: Normocephalic and atraumatic.      Mouth/Throat:      Mouth: Mucous membranes are moist.      Pharynx: Oropharynx is clear.   Cardiovascular:      Rate and Rhythm: Normal rate and regular rhythm.      Pulses:           Radial pulses are 2+ on the right side and 2+ on the left side.        Dorsalis pedis pulses are 2+ on the right side and 2+ on the left side.      Heart sounds: Normal heart sounds. No murmur heard.  Pulmonary:      Effort: Pulmonary effort is normal.      Breath sounds: Normal breath sounds.   Abdominal:      General: Bowel sounds are normal.      Palpations: Abdomen is soft.   Musculoskeletal:         General: Normal range of motion.      Cervical back: Neck supple.      Right lower leg: No edema.      Left lower leg: No edema.   Skin:     General: Skin is warm and dry.      Capillary Refill: Capillary refill takes less than 2 seconds.   Neurological:      General: No focal deficit present.      Mental Status: He is alert and oriented to person, place, and time. Mental status is at baseline.      Cranial Nerves: Cranial nerves 2-12 are intact.   Psychiatric:         Mood and Affect: Mood and affect normal.         Behavior: Behavior normal.        Sedation Plan    ASA 3     Mallampati class: II.           NPO since 1900 on 6/22/2025    Last Recorded Vitals  Blood pressure 130/77, pulse 90, temperature 37.1 °C (98.7 °F), temperature source Temporal, resp. rate 16, height 1.727 m (5' 8\"), weight 81.6 kg (180 lb), SpO2 97%.         Vitals from the Past 24 Hours  Heart Rate:  [90]   Temp:  [37.1 °C (98.7 °F)]   Resp:  [16]   BP: (130)/(77)   Height:  [172.7 cm (5' 8\")]   Weight:  [81.6 kg (180 lb)] " "  SpO2:  [97 %]          Relevant Results    Labs    POCT Glucose:      POCT Urine Pregnancy:       CBC:   Recent Labs     06/03/25  0925 05/27/25  0824 05/20/25  0942 05/13/25  1010 05/06/25  0931 04/25/25  1032   WBC 4.3 4.1* 4.9 6.0 7.0 5.5   HGB 12.8* 12.8* 12.1* 11.9* 12.9* 13.0*   HCT 40.0 39.3* 37.4* 36.6* 40.3* 40.4*   PLT 78* 124* 151 133* 106* 109*   MCV 93.7 92 92 92 91 91     BMP/CMP:   Recent Labs     06/03/25  0925 05/27/25  0824 05/20/25  0942 05/13/25  1010 05/06/25  0931 04/25/25  1032    137 140 138 139 137   K 4.9 4.1 4.3 4.2 4.3 4.3    104 104 105 103 103   BUN 27* 29* 22 27* 25* 27*   CREATININE 1.48* 1.33* 1.48* 1.44* 1.47* 1.53*   CO2 31 30 30 25 31 28   CALCIUM 8.9 9.2 9.3 8.9 9.2 9.2   PROT 5.8* 6.3* 5.8* 5.7* 6.0* 6.2*   BILITOT 1.1 0.9 0.9 0.7 0.7 1.0   ALKPHOS 53 51 58 55 50 56   ALT 12 12 12 12 15 14   AST 16 17 17 16 17 17   GLUCOSE 105* 111* 106* 117* 92 114*      Magnesium:   Recent Labs     06/03/25  0925 05/27/25  0824 04/25/25  1032 04/22/25  0852   MG 2.3 2.02 2.12 1.90     Lipid Panel:   Recent Labs     09/25/23  0852 09/23/22  0951 09/23/21  0927 09/16/20  0950 09/26/19  0900 09/20/18  1048   CHOL 193 165 218* 215* 207* 210*   HDL 28.8* 32.0* 33.0* 36.0* 32.0* 32.0*   CHHDL 6.7* 5.2* 6.6* 6.0* 6.5* 6.6   VLDL 37 22 41* 33 37 36   TRIG 184* 110 205* 167* 184* 180*   NHDL  --   --  185  --   --   --      Cardiac       No lab exists for component: \"CK\", \"CKMBP\"   Hemoglobin A1C: No results for input(s): \"HGBA1C\" in the last 80314 hours.  TSH/ Free T4:   Recent Labs     09/25/23  0852 09/23/22  0951 09/23/21  0927 09/16/20  0950 09/26/19  0900 09/20/18  1048   TSH 1.72 1.44 1.23 1.42 1.41 0.92   FREET4  --   --   --   --   --  1.31     Iron:   Recent Labs     01/16/25  1342   FERRITIN 219   TIBC 269   IRONSAT 25     Coag:     ABO: No results found for: \"ABO\"    Past Cardiology Tests (Last 3 Years):    EKG:  No results for input(s): \"ATRRATE\", \"VENTRATE\", \"PRINT\", " "\"QRSDUR\", \"QTCFRED\", \"QTCCALCB\" in the last 12333 hours.No results found for this or any previous visit (from the past 4464 hours).  Echo:  Echocardiogram:   Transthoracic Echo (TTE) Complete 05/16/2025    [facility], [address] [place] [address]  Tel [phone] and Fax [phone]    TRANSTHORACIC ECHOCARDIOGRAM REPORT      Patient Name:       CHINA FIGUEROA        L[place]andro Physician:    [id number] Salbador Driscoll MD  Study Date:         5/16/2025           Ordering Provider:    [id number] AGUS SAUCEDO  MRN/PID:            [id number]            Fellow:  Accession#:         [id number]        Nurse:  Date of Birth/Age:  1940 / 84      Sonographer:          Yanelis mercer RDCS  Gender assigned at  M                   Additional Staff:  Birth:  Height:             170.18 cm           Admit Date:  Weight:             83.01 kg            Admission Status:     Outpatient  BSA / BMI:          1.95 m2 / 28.66     Encounter#:           [id number]  kg/m2  Blood Pressure:     135/57 mmHg         Department Location:  [facility]    Study Type:    TRANSTHORACIC ECHO (TTE) COMPLETE  Diagnosis/ICD: Presence of prosthetic heart valve-Z95.2  Indication:    Aortic valve replacement  CPT Code:      Echo Complete w Full Doppler-29226    Patient History:  Valve Disorders:   Aortic Valve Replacement.  Pacer/Defib:       Permanent pacemaker  Pertinent History: HTN, A-Fib and CKD.    Study Detail: The following Echo studies were performed: 2D, M-Mode, Doppler and  color flow.      PHYSICIAN INTERPRETATION:  Left Ventricle: The left ventricular systolic function is normal, with a visually estimated ejection fraction of 55%. There is mild concentric left ventricular hypertrophy. There are no regional left ventricular wall motion abnormalities. The left ventricular cavity size is normal. There is mildly increased septal and moderately increased posterior left " ventricular wall thickness. Spectral Doppler shows an abnormal pattern of left ventricular diastolic filling.  Left Atrium: The left atrial size is severely dilated.  Right Ventricle: The right ventricle is normal in size. There is normal right ventricular global systolic function. A device is visualized in the right ventricle.  Right Atrium: The right atrium is moderately dilated.  Aortic Valve: There is a prosthetic aortic valve present. The aortic valve dimensionless index is 0.62. There is mild aortic valve regurgitation. The peak instantaneous gradient of the aortic valve is 10 mmHg. The mean gradient of the aortic valve is 5 mmHg.  Mitral Valve: The mitral valve is normal in structure. There is evidence of mild to moderate mitral valve stenosis. The doppler estimated mean and peak diastolic pressure gradients are 4 mmHg and 16 mmHg respectively. There is mild to moderate mitral annular calcification. The peak instantaneous gradient of the mitral valve is 16 mmHg. There is mild mitral valve regurgitation.  Tricuspid Valve: The tricuspid valve is structurally normal. There is mild to moderate tricuspid regurgitation.  Pulmonic Valve: The pulmonic valve is structurally normal. There is mild pulmonic valve regurgitation.  Pericardium: There is no pericardial effusion noted.  Aorta: The aortic root is normal.  Pulmonary Artery: The tricuspid regurgitant velocity is 3.16 m/s, and with an estimated right atrial pressure of 10 mmHg, the estimated pulmonary artery pressure is mild to moderately elevated with the RVSP at 49.9 mmHg.  Systemic Veins: The inferior vena cava appears normal in size.      CONCLUSIONS:  1. The left ventricular systolic function is normal, with a visually estimated ejection fraction of 55%.  2. Spectral Doppler shows an abnormal pattern of left ventricular diastolic filling.  3. There is moderately increased posterior left ventricular wall thickness.  4. The left atrial size is severely  dilated.  5. The right atrium is moderately dilated.  6. Mild to moderate tricuspid regurgitation visualized.  7. Mild aortic valve regurgitation. There is a prosthetic aortic valve present  8. Mild to moderately elevated pulmonary artery pressure, estimated RVSP 50mmHg.  9. There is mild to moderate mitral valve stenosis.    QUANTITATIVE DATA SUMMARY:    2D MEASUREMENTS:          Normal Ranges:  IVSd:            1.10 cm  (0.6-1.1cm)  LVPWd:           1.43 cm  (0.6-1.1cm)  LVIDd:           5.00 cm  (3.9-5.9cm)  LVIDs:           3.60 cm  LV Mass Index:   129 g/m2  LVEDV Index:     64 ml/m2  LV % FS          28.0 %      LEFT ATRIUM:                  Normal Ranges:  LA Vol A4C:        144.5 ml   (22+/-6mL/m2)  LA Vol A2C:        167.7 ml  LA Vol BP:         156.8 ml  LA Vol Index A4C:  74.2ml/m2  LA Vol Index A2C:  86.1 ml/m2  LA Vol Index BP:   80.5 ml/m2  LA Area A4C:       34.5 cm2  LA Area A2C:       36.9 cm2  LA Major Axis A4C: 7.0 cm  LA Major Axis A2C: 6.9 cm  LA Volume Index:   80.2 ml/m2      M-MODE MEASUREMENTS:         Normal Ranges:  Ao Root:             3.20 cm (2.0-3.7cm)      AORTA MEASUREMENTS:         Normal Ranges:  Asc Ao, d:          3.40 cm (2.1-3.4cm)      LV SYSTOLIC FUNCTION:  Normal Ranges:  EF-A4C View:    51 % (>=55%)  EF-A2C View:    57 %  EF-Biplane:     54 %  EF-Visual:      55 %  LV EF Reported: 55 %      MITRAL VALVE:           Normal Ranges:  MV Vmax:      2.03 m/s  (<=1.3m/s)  MV peak P.4 mmHg (<5mmHg)  MV mean P.3 mmHg  (<2mmHg)      AORTIC VALVE:                     Normal Ranges:  AoV Vmax:                1.55 m/s (<=1.7m/s)  AoV Peak P.7 mmHg (<20mmHg)  AoV Mean P.6 mmHg (1.7-11.5mmHg)  LVOT Max Ad:            1.04 m/s (<=1.1m/s)  AoV VTI:                 34.24 cm (18-25cm)  LVOT VTI:                21.33 cm  LVOT Diameter:           2.50 cm  (1.8-2.4cm)  AoV Area, VTI:           3.07 cm2 (2.5-5.5cm2)  AoV Area,Vmax:           3.28 cm2  (2.5-4.5cm2)  AoV Dimensionless Index: 0.62      RIGHT VENTRICLE:  RV Basal 3.90 cm  RV Mid   2.90 cm  RV Major 8.3 cm  TAPSE:   8.7 mm  RV s'    0.08 m/s      TRICUSPID VALVE/RVSP:          Normal Ranges:  Peak TR Velocity:     3.16 m/s  Est. RA Pressure:     10 mmHg  RV Syst Pressure:     50 mmHg  (< 30mmHg)  IVC Diam:             1.75 cm      PULMONIC VALVE:          Normal Ranges:  PV Accel Time:  86 msec  (>120ms)  PV Max Ad:     0.9 m/s  (0.6-0.9m/s)  PV Max PG:      3.1 mmHg      AORTA:  Asc Ao Diam 3.44 cm      [id number] Salbador Driscoll MD  Electronically signed on 5/21/2025 at 11:40:29 AM        ** Final **    Ejection Fractions:  LV EF   Date/Time Value Ref Range Status   05/16/2025 01:54 PM 55 %      Cath:  Coronary Angiography: No results found for this or any previous visit from the past 1800 days.    Right Heart Cath: No results found for this or any previous visit from the past 1800 days.    Stress Test:  Nuclear:No results found for this or any previous visit from the past 1800 days.    Metabolic Stress: No results found for this or any previous visit from the past 1800 days.    Cardiac Imaging:  Cardiac Scoring: No results found for this or any previous visit from the past 1800 days.    Cardiac MRI: No results found for this or any previous visit from the past 1800 days.         Assessment/Plan  Assessment/Plan   Assessment & Plan  Complete heart block      Complete heart block  Hfr EF  S/P PPM in situ  Device has reached end of life.     Here for generator change out with Dr. Mendoza on 6/23/2025.    Pre-procedure INR 1.6.       NP discussed with Dr. Mendoza regarding plan of care/ discharge plan      I spent 35 minutes in the professional and overall care of this patient.      Barbi Alvarado, APRN-CNP         [1]   Past Medical History:  Diagnosis Date    A-fib (Multi)     Chronic sinusitis, unspecified 05/11/2017    Sinobronchitis    Coronary artery disease     Disease of thyroid gland     Eye  cancer, left (Multi)     Hypertension     Hypothyroidism     Hypothyroidism, unspecified 10/12/2022    Adult hypothyroidism    Other specified disorders of eye and adnexa 2019    Irritation of right eye    Personal history of other diseases of the respiratory system 04/10/2015    History of acute bronchitis    Personal history of other diseases of the respiratory system 2016    History of acute sinusitis   [2]   Past Surgical History:  Procedure Laterality Date    AORTIC VALVE REPLACEMENT      CARDIAC PACEMAKER PLACEMENT  2014    Pacemaker Placement    CATARACT EXTRACTION Bilateral     FRACTURE SURGERY      shoulder fracture    KNEE ARTHROPLASTY Left     PROSTATE SURGERY      TURP   [3]   Social History  Tobacco Use    Smoking status: Former     Current packs/day: 0.00     Types: Cigarettes     Quit date:      Years since quittin.4    Smokeless tobacco: Never    Tobacco comments:     Denies SOB with activity, stairs or lying flat. Ambulates freely. No illness last 30 days. Denies personal or family reaction to anesthesia.    Vaping Use    Vaping status: Never Used   Substance Use Topics    Alcohol use: Yes     Alcohol/week: 7.0 standard drinks of alcohol     Types: 7 Cans of beer per week    Drug use: Never   [4]   Family History  Problem Relation Name Age of Onset    Alzheimer's disease Mother          passed at 84    No Known Problems Father          passed at 66    Heart disease Brother Enrrique     No Known Problems Daughter      No Known Problems Son      No Known Problems Son     [5] No Known Allergies  [6]   Scheduled medications   Medication Dose Route Frequency    ceFAZolin  2 g intravenous Once   [7]   PRN medications   Medication   [8]   Continuous Medications   Medication Dose Last Rate

## 2025-06-23 ENCOUNTER — HOSPITAL ENCOUNTER (OUTPATIENT)
Facility: HOSPITAL | Age: 85
Setting detail: OUTPATIENT SURGERY
Discharge: HOME | End: 2025-06-23
Attending: INTERNAL MEDICINE | Admitting: INTERNAL MEDICINE
Payer: MEDICARE

## 2025-06-23 VITALS
RESPIRATION RATE: 16 BRPM | DIASTOLIC BLOOD PRESSURE: 69 MMHG | HEART RATE: 71 BPM | OXYGEN SATURATION: 100 % | SYSTOLIC BLOOD PRESSURE: 124 MMHG | WEIGHT: 180 LBS | TEMPERATURE: 98.7 F | BODY MASS INDEX: 27.28 KG/M2 | HEIGHT: 68 IN

## 2025-06-23 DIAGNOSIS — I44.2 COMPLETE HEART BLOCK: Primary | ICD-10-CM

## 2025-06-23 LAB
POCT INTERNATIONAL NORMALIZATION RATIO: 1.6
POCT PROTHROMBIN TIME: 19.2 SECONDS

## 2025-06-23 PROCEDURE — 33228 REMV&REPLC PM GEN DUAL LEAD: CPT | Performed by: INTERNAL MEDICINE

## 2025-06-23 PROCEDURE — 99152 MOD SED SAME PHYS/QHP 5/>YRS: CPT | Performed by: INTERNAL MEDICINE

## 2025-06-23 PROCEDURE — 2500000004 HC RX 250 GENERAL PHARMACY W/ HCPCS (ALT 636 FOR OP/ED): Performed by: INTERNAL MEDICINE

## 2025-06-23 PROCEDURE — 99223 1ST HOSP IP/OBS HIGH 75: CPT | Performed by: NURSE PRACTITIONER

## 2025-06-23 PROCEDURE — 7100000009 HC PHASE TWO TIME - INITIAL BASE CHARGE: Performed by: INTERNAL MEDICINE

## 2025-06-23 PROCEDURE — 2720000007 HC OR 272 NO HCPCS: Performed by: INTERNAL MEDICINE

## 2025-06-23 PROCEDURE — C1889 IMPLANT/INSERT DEVICE, NOC: HCPCS | Performed by: INTERNAL MEDICINE

## 2025-06-23 PROCEDURE — 7100000010 HC PHASE TWO TIME - EACH INCREMENTAL 1 MINUTE: Performed by: INTERNAL MEDICINE

## 2025-06-23 PROCEDURE — C1785 PMKR, DUAL, RATE-RESP: HCPCS | Performed by: INTERNAL MEDICINE

## 2025-06-23 PROCEDURE — 99153 MOD SED SAME PHYS/QHP EA: CPT | Performed by: INTERNAL MEDICINE

## 2025-06-23 PROCEDURE — 2500000004 HC RX 250 GENERAL PHARMACY W/ HCPCS (ALT 636 FOR OP/ED): Performed by: NURSE PRACTITIONER

## 2025-06-23 PROCEDURE — 2750000001 HC OR 275 NO HCPCS: Performed by: INTERNAL MEDICINE

## 2025-06-23 DEVICE — DR PACEMAKER DDDR
Type: IMPLANTABLE DEVICE | Site: CHEST  WALL | Status: FUNCTIONAL
Brand: ASSURITY MRI™

## 2025-06-23 RX ORDER — BUPIVACAINE HYDROCHLORIDE 2.5 MG/ML
INJECTION, SOLUTION EPIDURAL; INFILTRATION; INTRACAUDAL; PERINEURAL AS NEEDED
Status: DISCONTINUED | OUTPATIENT
Start: 2025-06-23 | End: 2025-06-23 | Stop reason: HOSPADM

## 2025-06-23 RX ORDER — FENTANYL CITRATE 50 UG/ML
INJECTION, SOLUTION INTRAMUSCULAR; INTRAVENOUS AS NEEDED
Status: DISCONTINUED | OUTPATIENT
Start: 2025-06-23 | End: 2025-06-23 | Stop reason: HOSPADM

## 2025-06-23 RX ORDER — ONDANSETRON 4 MG/1
4 TABLET, FILM COATED ORAL EVERY 8 HOURS PRN
Status: DISCONTINUED | OUTPATIENT
Start: 2025-06-23 | End: 2025-06-23 | Stop reason: HOSPADM

## 2025-06-23 RX ORDER — MIDAZOLAM HYDROCHLORIDE 1 MG/ML
INJECTION, SOLUTION INTRAMUSCULAR; INTRAVENOUS AS NEEDED
Status: DISCONTINUED | OUTPATIENT
Start: 2025-06-23 | End: 2025-06-23 | Stop reason: HOSPADM

## 2025-06-23 RX ORDER — ACETAMINOPHEN 160 MG/5ML
650 SOLUTION ORAL EVERY 4 HOURS PRN
Status: DISCONTINUED | OUTPATIENT
Start: 2025-06-23 | End: 2025-06-23 | Stop reason: HOSPADM

## 2025-06-23 RX ORDER — ONDANSETRON HYDROCHLORIDE 2 MG/ML
4 INJECTION, SOLUTION INTRAVENOUS EVERY 8 HOURS PRN
Status: DISCONTINUED | OUTPATIENT
Start: 2025-06-23 | End: 2025-06-23 | Stop reason: HOSPADM

## 2025-06-23 RX ORDER — ACETAMINOPHEN 325 MG/1
650 TABLET ORAL EVERY 4 HOURS PRN
Status: DISCONTINUED | OUTPATIENT
Start: 2025-06-23 | End: 2025-06-23 | Stop reason: HOSPADM

## 2025-06-23 RX ORDER — CEFAZOLIN SODIUM 2 G/50ML
2 SOLUTION INTRAVENOUS ONCE
Status: COMPLETED | OUTPATIENT
Start: 2025-06-23 | End: 2025-06-23

## 2025-06-23 RX ORDER — ACETAMINOPHEN 650 MG/1
650 SUPPOSITORY RECTAL EVERY 4 HOURS PRN
Status: DISCONTINUED | OUTPATIENT
Start: 2025-06-23 | End: 2025-06-23 | Stop reason: HOSPADM

## 2025-06-23 RX ADMIN — CEFAZOLIN SODIUM 2 G: 2 SOLUTION INTRAVENOUS at 10:33

## 2025-06-23 ASSESSMENT — ENCOUNTER SYMPTOMS
RESPIRATORY NEGATIVE: 1
ENDOCRINE NEGATIVE: 1
HEMATOLOGIC/LYMPHATIC NEGATIVE: 1
NEUROLOGICAL NEGATIVE: 1
GASTROINTESTINAL NEGATIVE: 1
PSYCHIATRIC NEGATIVE: 1
ALLERGIC/IMMUNOLOGIC NEGATIVE: 1
CARDIOVASCULAR NEGATIVE: 1
MUSCULOSKELETAL NEGATIVE: 1
CONSTITUTIONAL NEGATIVE: 1
EYES NEGATIVE: 1

## 2025-06-23 ASSESSMENT — PAIN SCALES - GENERAL: PAINLEVEL_OUTOF10: 0 - NO PAIN

## 2025-06-23 ASSESSMENT — COLUMBIA-SUICIDE SEVERITY RATING SCALE - C-SSRS
1. IN THE PAST MONTH, HAVE YOU WISHED YOU WERE DEAD OR WISHED YOU COULD GO TO SLEEP AND NOT WAKE UP?: NO
6. HAVE YOU EVER DONE ANYTHING, STARTED TO DO ANYTHING, OR PREPARED TO DO ANYTHING TO END YOUR LIFE?: NO
2. HAVE YOU ACTUALLY HAD ANY THOUGHTS OF KILLING YOURSELF?: NO

## 2025-06-23 ASSESSMENT — PAIN - FUNCTIONAL ASSESSMENT: PAIN_FUNCTIONAL_ASSESSMENT: 0-10

## 2025-06-23 NOTE — Clinical Note
The leads were placed into the connector and visually verified to be in correct position. Injury current not obtained.

## 2025-06-23 NOTE — NURSING NOTE
END OF PROCEDURE MONITORING CRITERIA  01. BP is within +/- 20% of preprocedure  02. Oxygen sat is at 92% or above on RA, or existing order for O2 treatment, or at pre-sedation levels, otherwise new O2 order needed.  03. Unless the patient has a pre-procedure history of diminished level of consciousness, s/he is easily arousable and when aroused is able to responds appropriately for his/her age.  04. Significant complications related to the specific procedure are absent, have been controlled, or have been evaluated, including:      A. Pain.      B. Wound drainage.      C. All drains and tubes are patent.      D. Nausea and Vomiting. Vomiting is not persisten and has not occurred within 15 minutes prior to discharge.      E. Bladder distention. Voided bladder and/or no symptoms or urinary retention (e.g., bladder distention, frequent voiding in small amounts).      F. Neurovascular status.      G. Level of Consciousness consistent with pre procedural status.        spouse  affirmed that they were driving the patient home.   Generator change card info with patient

## 2025-06-23 NOTE — POST-PROCEDURE NOTE
Physician Transition of Care Summary  Invasive Cardiovascular Lab    Procedure Date: 6/23/2025  Attending:    * Jag Mendoza - Primary  Resident/Fellow/Other Assistant: Surgeons and Role:  * No surgeons found with a matching role *    Indications:   Pre-op Diagnosis      * Complete heart block [I44.2]    Post-procedure diagnosis:   Post-op Diagnosis     * Complete heart block [I44.2]    Procedure(s):   PPM Generator Change  31453 - TN REMVL PERM PM PLS GEN W/REPL PLSE GEN 2 LEAD SYS        Procedure Findings:   See below    Description of the Procedure:   The patient was brought to the EP laboratory while in a paced rhythm. fentanyl and Versed were used for sedation and bupivacaine for local anesthetic .  We prepped and draped the upper left chest in the usual sterile fashion and placed a 3 centimeter incision overlying the previous 1 the left deltopectoral groove .  The device was freed up from scar tissue and the pocket was revised to accommodate the larger pacemaker generator for the device.The chronic leads had normal electricals    The pocket was closed with 3 layers of suture an interrupted 0 Vicryl layer, A 3.0 V lock layer and a 4.0 V lock continuous layer for the subcuticular layer.  An antibiotic tyrex pouch was utilized and Steri-Strips and island dressing were applied are no complications  The new device is MRI safe     Summary:   Successful dual-chamber pacemaker replacement       Complications:   none    Stents/Implants:   Implants          Implant          Pacemaker, Generator, Dual Assurity Mri - [id number] - Implanted        Inventory item: PACEMAKER, GENERATOR, DUAL ASSURITY MRI Model/Cat number: [id number]    Serial number: [id number] : [employer]    Lot number: [id number] Device identifier: [id number]      GUDID Information       Request status Successful        Brand name: Assurity MRI™ Version/Model: [id number]    Company name: [employer] MRI safety info as of 6/23/25:  MR Conditional    Contains dry or latex rubber: No      GMDN P.T. name: Dual-chamber implantable pacemaker, rate-responsive                As of 6/23/2025       Status: Implanted                              Anticoagulation/Antiplatelet Plan:   none    Estimated Blood Loss:   25 mL    Anesthesia: Moderate Sedation Anesthesia Staff: No anesthesia staff entered.    Any Specimen(s) Removed:   No specimens collected during this procedure.    Disposition:   stable      Electronically signed by: Jag Mendoza MD, 6/23/2025 11:46 AM

## 2025-06-23 NOTE — DISCHARGE INSTRUCTIONS
****Please call whomever manages your Coumadin (Warfarin) for an INR check in 2-3 days.*****      Home-going Instructions After Device Implant    FOR NEXT 24 HOURS  - Upon discharge, you should return home and rest for the remainder of the day and evening. You do not have to stay on bed rest but should not be very active.  It is recommended a responsible adult be with you for the first 24 hours after the procedure.    -Please resume your blood thinning medication the evening of your procedure.      - Do not drive, operate machinery, or use power tools for 24 hours after your procedure.     - Do not make any legal decisions for 24 hours after your procedure.     - Do not drink alcoholic beverages for 24 hours after your procedure.    Incision:  If you have an occlusive dressing like Aquacel (tan-colored bandage) or Mepilex border, please leave on for one week.  Do not remove the steri strips, they will fall off on their own.     May shower tomorrow.   Do not submerge site in bath/swimming for 2 weeks, until incision is healed.       Call the Device Clinic at [phone] If you have any questions about your instructions, Monday-Friday between the hours of 7:00 am - 4:30 pm. After hours please call your physician's office.   After one week, you may wash the site with soap and water.   Inspect your incision each day.  If you note increased redness, swelling, or drainage, or if you develop a fever, please call your doctor IMMEDIATELY.     Your Doctor:  Dr. GODOY    Telephone: [phone]    Pain:  It is normal for the area around the incision to be tender for a few weeks following surgery.  Pain relievers such as Tylenol or Motrin are usually sufficient for pain relief.  The pain should get better each day, if it gets worse, please contact your doctor.    Activity Restrictions: device replacement 1 week 6/30/2025. Avoid gross arm movement on the side of your new implant.  Do not raise or stretch your arm above  shoulder level.  Do not pick-up weights greater than 15 lbs.  Avoid activities such as golf or swimming for six weeks.      Driving: Please do not resume driving for 1 week(s) date: 6/30/2025.     ID Card:  It is important that you carry your device ID card with you at all times.  Inform all doctors and healthcare providers that you have a pacemaker or defibrillator.  Until the lead wires are completely healed in your heart, a prophylactic antibiotic may need to be given to you prior to procedures such as deep cleaning or extraction of teeth.    Electromagnetic Interference:  Microwave ovens are safe to use.  Cellular telephones should be held to the ear that is opposite your device.  If you are scheduled for a MRI, please notify the Device Clinic to check if you have a compatible device.  Present your device ID card to the airport .  The detector wand may be used below waist level and to inspect your shoes.  Read the patient booklet for more information.  You may call the device clinic or the patient services department of the device  with questions about specific electrical appliances and interference problems.          Device Clinic: [phone] (this is Not an emergency number)    Can I Travel?  -Always carry your pacemaker identification card.  -Ask to be hand searched at security checkpoints, such as at the airport or your local courthouse.    Hand wands are not a substitute for a “pat down” search. Surveillance wands may be used  below your waist and on your shoes.  -If you are making an extended trip to another city, your pacemaker doctor may recommend a  physician or hospital that you can contact.    Can I Drive?  -Your doctor will specify any driving restrictions.    Other Points:  -Always notify healthcare providers, such as dentists and podiatrists, that you have an pacemaker. If  any surgery is planned for you, the anesthesia personnel must know in advance about your  pacemaker.    Other Sources of Information:  Device Clinic Nurses: [phone]  Patient Services Department of the  of your pacemaker  The Internet holds an abundance of information. Some internet sites of interest:  www.[email or web address]  www.[employer].com  www.[email or web address]hybridge  www.heartGuideslyythm.[email or web address]  www.[email or web address]    This info is a general resource. It is not meant to replace your health care provider’s advice.  Ask your doctor or health care team any questions. Always follow their instructions.

## 2025-06-24 ENCOUNTER — APPOINTMENT (OUTPATIENT)
Dept: HEMATOLOGY/ONCOLOGY | Facility: HOSPITAL | Age: 85
End: 2025-06-24
Payer: MEDICARE

## 2025-06-24 ENCOUNTER — TELEPHONE (OUTPATIENT)
Dept: HEMATOLOGY/ONCOLOGY | Facility: CLINIC | Age: 85
End: 2025-06-24

## 2025-06-24 VITALS
TEMPERATURE: 96.6 F | RESPIRATION RATE: 16 BRPM | BODY MASS INDEX: 28.33 KG/M2 | DIASTOLIC BLOOD PRESSURE: 68 MMHG | HEART RATE: 69 BPM | SYSTOLIC BLOOD PRESSURE: 144 MMHG | WEIGHT: 186.29 LBS | OXYGEN SATURATION: 97 %

## 2025-06-24 DIAGNOSIS — C83.00 SMALL LYMPHOCYTIC LYMPHOMA (MULTI): ICD-10-CM

## 2025-06-24 DIAGNOSIS — C83.00 SMALL LYMPHOCYTIC LYMPHOMA (MULTI): Primary | ICD-10-CM

## 2025-06-24 LAB
ALBUMIN SERPL BCP-MCNC: 3.6 G/DL (ref 3.4–5)
ALP SERPL-CCNC: 48 U/L (ref 33–136)
ALT SERPL W P-5'-P-CCNC: 10 U/L (ref 10–52)
ANION GAP SERPL CALC-SCNC: 9 MMOL/L (ref 10–20)
AST SERPL W P-5'-P-CCNC: 12 U/L (ref 9–39)
BASOPHILS # BLD AUTO: 0.02 X10*3/UL (ref 0–0.1)
BASOPHILS NFR BLD AUTO: 0.8 %
BILIRUB SERPL-MCNC: 0.7 MG/DL (ref 0–1.2)
BUN SERPL-MCNC: 28 MG/DL (ref 6–23)
CALCIUM SERPL-MCNC: 8.9 MG/DL (ref 8.6–10.3)
CHLORIDE SERPL-SCNC: 105 MMOL/L (ref 98–107)
CO2 SERPL-SCNC: 28 MMOL/L (ref 21–32)
CREAT SERPL-MCNC: 1.46 MG/DL (ref 0.5–1.3)
EGFRCR SERPLBLD CKD-EPI 2021: 47 ML/MIN/1.73M*2
EOSINOPHIL # BLD AUTO: 0.01 X10*3/UL (ref 0–0.4)
EOSINOPHIL NFR BLD AUTO: 0.4 %
ERYTHROCYTE [DISTWIDTH] IN BLOOD BY AUTOMATED COUNT: 15.4 % (ref 11.5–14.5)
GLUCOSE SERPL-MCNC: 98 MG/DL (ref 74–99)
HCT VFR BLD AUTO: 36.8 % (ref 41–52)
HGB BLD-MCNC: 12.3 G/DL (ref 13.5–17.5)
IMM GRANULOCYTES # BLD AUTO: 0.13 X10*3/UL (ref 0–0.5)
IMM GRANULOCYTES NFR BLD AUTO: 4.9 % (ref 0–0.9)
LDH SERPL L TO P-CCNC: 172 U/L (ref 84–246)
LYMPHOCYTES # BLD AUTO: 0.74 X10*3/UL (ref 0.8–3)
LYMPHOCYTES NFR BLD AUTO: 27.8 %
MCH RBC QN AUTO: 31.3 PG (ref 26–34)
MCHC RBC AUTO-ENTMCNC: 33.4 G/DL (ref 32–36)
MCV RBC AUTO: 94 FL (ref 80–100)
MONOCYTES # BLD AUTO: 0.98 X10*3/UL (ref 0.05–0.8)
MONOCYTES NFR BLD AUTO: 36.8 %
NEUTROPHILS # BLD AUTO: 0.78 X10*3/UL (ref 1.6–5.5)
NEUTROPHILS NFR BLD AUTO: 29.3 %
NRBC BLD-RTO: 0 /100 WBCS (ref 0–0)
PLATELET # BLD AUTO: 150 X10*3/UL (ref 150–450)
POTASSIUM SERPL-SCNC: 4.3 MMOL/L (ref 3.5–5.3)
PROT SERPL-MCNC: 5.8 G/DL (ref 6.4–8.2)
RBC # BLD AUTO: 3.93 X10*6/UL (ref 4.5–5.9)
RBC MORPH BLD: NORMAL
SODIUM SERPL-SCNC: 138 MMOL/L (ref 136–145)
URATE SERPL-MCNC: 7.4 MG/DL (ref 4–7.5)
WBC # BLD AUTO: 2.7 X10*3/UL (ref 4.4–11.3)

## 2025-06-24 PROCEDURE — 36415 COLL VENOUS BLD VENIPUNCTURE: CPT

## 2025-06-24 PROCEDURE — 84550 ASSAY OF BLOOD/URIC ACID: CPT

## 2025-06-24 PROCEDURE — 85025 COMPLETE CBC W/AUTO DIFF WBC: CPT

## 2025-06-24 PROCEDURE — 80053 COMPREHEN METABOLIC PANEL: CPT

## 2025-06-24 PROCEDURE — 83615 LACTATE (LD) (LDH) ENZYME: CPT

## 2025-06-24 RX ORDER — DIPHENHYDRAMINE HYDROCHLORIDE 50 MG/ML
50 INJECTION, SOLUTION INTRAMUSCULAR; INTRAVENOUS AS NEEDED
OUTPATIENT
Start: 2025-07-01

## 2025-06-24 RX ORDER — EPINEPHRINE 0.3 MG/.3ML
0.3 INJECTION SUBCUTANEOUS EVERY 5 MIN PRN
OUTPATIENT
Start: 2025-07-01

## 2025-06-24 RX ORDER — ALBUTEROL SULFATE 0.83 MG/ML
3 SOLUTION RESPIRATORY (INHALATION) AS NEEDED
OUTPATIENT
Start: 2025-07-01

## 2025-06-24 RX ORDER — FAMOTIDINE 10 MG/ML
20 INJECTION, SOLUTION INTRAVENOUS ONCE AS NEEDED
OUTPATIENT
Start: 2025-07-01

## 2025-06-24 ASSESSMENT — PATIENT HEALTH QUESTIONNAIRE - PHQ9
1. LITTLE INTEREST OR PLEASURE IN DOING THINGS: NOT AT ALL
2. FEELING DOWN, DEPRESSED OR HOPELESS: NOT AT ALL
SUM OF ALL RESPONSES TO PHQ9 QUESTIONS 1 & 2: 0

## 2025-06-24 ASSESSMENT — PAIN SCALES - GENERAL: PAINLEVEL_OUTOF10: 0-NO PAIN

## 2025-06-24 NOTE — PROGRESS NOTES
Pt returned today for cycle 4 of Obinutuzumab,  ANC only .78 so pt did not meet parameters to treat.  Dr. TAYO Chris is OOT, so notified both on-call provider and Ruth Ann Chris for assistance.  Dr Swanson was willing to proceed with treatment and add neupogen, but Dr Ruth Ann Chris advised to delay treatment x1 week and have pt return 6/30 for labs and possible treatment if criteria met.  Pt to also hold venetoclax at this time.  Called pt's significant other and set up new appt for 6/30 at 8 am and advised her to hold his venetoclax.  She voiced understanding.

## 2025-06-24 NOTE — TELEPHONE ENCOUNTER
Reason for Conversation  Medication Problem    Background   Refill Allopurinol  [facility]     Disposition   No disposition on file.

## 2025-06-25 ENCOUNTER — APPOINTMENT (OUTPATIENT)
Dept: OPHTHALMOLOGY | Facility: CLINIC | Age: 85
End: 2025-06-25
Payer: MEDICARE

## 2025-06-30 ENCOUNTER — APPOINTMENT (OUTPATIENT)
Dept: HEMATOLOGY/ONCOLOGY | Facility: HOSPITAL | Age: 85
End: 2025-06-30
Payer: MEDICARE

## 2025-06-30 VITALS
DIASTOLIC BLOOD PRESSURE: 64 MMHG | TEMPERATURE: 96.8 F | HEART RATE: 71 BPM | BODY MASS INDEX: 28.81 KG/M2 | OXYGEN SATURATION: 97 % | SYSTOLIC BLOOD PRESSURE: 129 MMHG | WEIGHT: 183.53 LBS | HEIGHT: 67 IN | RESPIRATION RATE: 17 BRPM

## 2025-06-30 DIAGNOSIS — C83.00 SMALL LYMPHOCYTIC LYMPHOMA (MULTI): ICD-10-CM

## 2025-06-30 LAB
ALBUMIN SERPL BCP-MCNC: 3.8 G/DL (ref 3.4–5)
ALP SERPL-CCNC: 52 U/L (ref 33–136)
ALT SERPL W P-5'-P-CCNC: 9 U/L (ref 10–52)
ANION GAP SERPL CALC-SCNC: 11 MMOL/L (ref 10–20)
AST SERPL W P-5'-P-CCNC: 14 U/L (ref 9–39)
BASOPHILS # BLD MANUAL: 0.03 X10*3/UL (ref 0–0.1)
BASOPHILS NFR BLD MANUAL: 1 %
BILIRUB SERPL-MCNC: 0.6 MG/DL (ref 0–1.2)
BUN SERPL-MCNC: 24 MG/DL (ref 6–23)
CALCIUM SERPL-MCNC: 9.1 MG/DL (ref 8.6–10.3)
CHLORIDE SERPL-SCNC: 105 MMOL/L (ref 98–107)
CO2 SERPL-SCNC: 26 MMOL/L (ref 21–32)
CREAT SERPL-MCNC: 1.38 MG/DL (ref 0.5–1.3)
EGFRCR SERPLBLD CKD-EPI 2021: 50 ML/MIN/1.73M*2
EOSINOPHIL # BLD MANUAL: 0 X10*3/UL (ref 0–0.4)
EOSINOPHIL NFR BLD MANUAL: 0 %
ERYTHROCYTE [DISTWIDTH] IN BLOOD BY AUTOMATED COUNT: 14.6 % (ref 11.5–14.5)
GLUCOSE SERPL-MCNC: 120 MG/DL (ref 74–99)
HCT VFR BLD AUTO: 37.9 % (ref 41–52)
HGB BLD-MCNC: 12.5 G/DL (ref 13.5–17.5)
IMM GRANULOCYTES # BLD AUTO: 0.21 X10*3/UL (ref 0–0.5)
IMM GRANULOCYTES NFR BLD AUTO: 6.3 % (ref 0–0.9)
LYMPHOCYTES # BLD MANUAL: 0.54 X10*3/UL (ref 0.8–3)
LYMPHOCYTES NFR BLD MANUAL: 16 %
MCH RBC QN AUTO: 30.6 PG (ref 26–34)
MCHC RBC AUTO-ENTMCNC: 33 G/DL (ref 32–36)
MCV RBC AUTO: 93 FL (ref 80–100)
MONOCYTES # BLD MANUAL: 0.71 X10*3/UL (ref 0.05–0.8)
MONOCYTES NFR BLD MANUAL: 21 %
NEUTROPHILS # BLD MANUAL: 2.11 X10*3/UL (ref 1.6–5.5)
NEUTS BAND # BLD MANUAL: 0.51 X10*3/UL (ref 0–0.5)
NEUTS BAND NFR BLD MANUAL: 15 %
NEUTS SEG # BLD MANUAL: 1.6 X10*3/UL (ref 1.6–5)
NEUTS SEG NFR BLD MANUAL: 47 %
NRBC BLD-RTO: 0 /100 WBCS (ref 0–0)
PLATELET # BLD AUTO: 158 X10*3/UL (ref 150–450)
POTASSIUM SERPL-SCNC: 4.2 MMOL/L (ref 3.5–5.3)
PROT SERPL-MCNC: 6 G/DL (ref 6.4–8.2)
RBC # BLD AUTO: 4.08 X10*6/UL (ref 4.5–5.9)
RBC MORPH BLD: ABNORMAL
SODIUM SERPL-SCNC: 138 MMOL/L (ref 136–145)
TOTAL CELLS COUNTED BLD: 100
WBC # BLD AUTO: 3.4 X10*3/UL (ref 4.4–11.3)

## 2025-06-30 PROCEDURE — 2500000004 HC RX 250 GENERAL PHARMACY W/ HCPCS (ALT 636 FOR OP/ED): Performed by: INTERNAL MEDICINE

## 2025-06-30 PROCEDURE — 85007 BL SMEAR W/DIFF WBC COUNT: CPT

## 2025-06-30 PROCEDURE — 85027 COMPLETE CBC AUTOMATED: CPT

## 2025-06-30 PROCEDURE — 96413 CHEMO IV INFUSION 1 HR: CPT

## 2025-06-30 PROCEDURE — 96415 CHEMO IV INFUSION ADDL HR: CPT

## 2025-06-30 PROCEDURE — 2500000001 HC RX 250 WO HCPCS SELF ADMINISTERED DRUGS (ALT 637 FOR MEDICARE OP): Performed by: INTERNAL MEDICINE

## 2025-06-30 PROCEDURE — 96375 TX/PRO/DX INJ NEW DRUG ADDON: CPT | Mod: INF

## 2025-06-30 PROCEDURE — 84075 ASSAY ALKALINE PHOSPHATASE: CPT

## 2025-06-30 RX ORDER — PROCHLORPERAZINE MALEATE 10 MG
10 TABLET ORAL EVERY 6 HOURS PRN
Status: DISCONTINUED | OUTPATIENT
Start: 2025-06-30 | End: 2025-06-30 | Stop reason: HOSPADM

## 2025-06-30 RX ORDER — PROCHLORPERAZINE EDISYLATE 5 MG/ML
10 INJECTION INTRAMUSCULAR; INTRAVENOUS EVERY 6 HOURS PRN
Status: DISCONTINUED | OUTPATIENT
Start: 2025-06-30 | End: 2025-06-30 | Stop reason: HOSPADM

## 2025-06-30 RX ORDER — EPINEPHRINE 0.3 MG/.3ML
0.3 INJECTION SUBCUTANEOUS EVERY 5 MIN PRN
Status: DISCONTINUED | OUTPATIENT
Start: 2025-06-30 | End: 2025-06-30 | Stop reason: HOSPADM

## 2025-06-30 RX ORDER — ACETAMINOPHEN 325 MG/1
650 TABLET ORAL ONCE
Status: COMPLETED | OUTPATIENT
Start: 2025-06-30 | End: 2025-06-30

## 2025-06-30 RX ORDER — ALBUTEROL SULFATE 0.83 MG/ML
3 SOLUTION RESPIRATORY (INHALATION) AS NEEDED
Status: DISCONTINUED | OUTPATIENT
Start: 2025-06-30 | End: 2025-06-30 | Stop reason: HOSPADM

## 2025-06-30 RX ORDER — DIPHENHYDRAMINE HCL 25 MG
50 CAPSULE ORAL ONCE
Status: COMPLETED | OUTPATIENT
Start: 2025-06-30 | End: 2025-06-30

## 2025-06-30 RX ORDER — DIPHENHYDRAMINE HYDROCHLORIDE 50 MG/ML
50 INJECTION, SOLUTION INTRAMUSCULAR; INTRAVENOUS AS NEEDED
Status: DISCONTINUED | OUTPATIENT
Start: 2025-06-30 | End: 2025-06-30 | Stop reason: HOSPADM

## 2025-06-30 RX ORDER — FAMOTIDINE 10 MG/ML
20 INJECTION, SOLUTION INTRAVENOUS ONCE AS NEEDED
Status: DISCONTINUED | OUTPATIENT
Start: 2025-06-30 | End: 2025-06-30 | Stop reason: HOSPADM

## 2025-06-30 RX ADMIN — DEXAMETHASONE SODIUM PHOSPHATE 20 MG: 10 INJECTION, SOLUTION INTRAMUSCULAR; INTRAVENOUS at 09:17

## 2025-06-30 RX ADMIN — OBINUTUZUMAB 1000 MG: 1000 INJECTION, SOLUTION, CONCENTRATE INTRAVENOUS at 09:45

## 2025-06-30 RX ADMIN — DIPHENHYDRAMINE HYDROCHLORIDE 50 MG: 25 CAPSULE ORAL at 09:14

## 2025-06-30 RX ADMIN — ACETAMINOPHEN 650 MG: 325 TABLET, FILM COATED ORAL at 09:14

## 2025-06-30 ASSESSMENT — PATIENT HEALTH QUESTIONNAIRE - PHQ9
SUM OF ALL RESPONSES TO PHQ9 QUESTIONS 1 & 2: 0
1. LITTLE INTEREST OR PLEASURE IN DOING THINGS: NOT AT ALL
2. FEELING DOWN, DEPRESSED OR HOPELESS: NOT AT ALL

## 2025-06-30 ASSESSMENT — PAIN SCALES - GENERAL: PAINLEVEL_OUTOF10: 0-NO PAIN

## 2025-06-30 NOTE — SIGNIFICANT EVENT
06/30/25 0852   Prechemo Checklist   Has the patient been in the hospital, ED, or urgent care since last date of service No   Chemo/Immuno Consent Completed and Signed Yes   Protocol/Indications Verified Yes   Confirmed to previous date/time of medication Yes   Compared to previous dose Yes   Pregnancy Test Negative Not applicable   Parameters Met Yes   BSA/Weight-Height Verified Yes   Dose Calculations Verified (current, total, cumulative) Yes

## 2025-07-03 ENCOUNTER — HOSPITAL ENCOUNTER (INPATIENT)
Facility: HOSPITAL | Age: 85
LOS: 1 days | Discharge: HOME | End: 2025-07-05
Attending: STUDENT IN AN ORGANIZED HEALTH CARE EDUCATION/TRAINING PROGRAM | Admitting: HOSPITALIST
Payer: MEDICARE

## 2025-07-03 ENCOUNTER — APPOINTMENT (OUTPATIENT)
Dept: CARDIOLOGY | Facility: HOSPITAL | Age: 85
End: 2025-07-03
Payer: MEDICARE

## 2025-07-03 ENCOUNTER — APPOINTMENT (OUTPATIENT)
Dept: RADIOLOGY | Facility: HOSPITAL | Age: 85
End: 2025-07-03
Payer: MEDICARE

## 2025-07-03 DIAGNOSIS — J18.9 ACUTE PNEUMONIA: Primary | ICD-10-CM

## 2025-07-03 DIAGNOSIS — E03.9 ACQUIRED HYPOTHYROIDISM: ICD-10-CM

## 2025-07-03 LAB
ALBUMIN SERPL BCP-MCNC: 3.9 G/DL (ref 3.4–5)
ALP SERPL-CCNC: 56 U/L (ref 33–136)
ALT SERPL W P-5'-P-CCNC: 15 U/L (ref 10–52)
ANION GAP BLDV CALCULATED.4IONS-SCNC: 6 MMOL/L (ref 10–25)
ANION GAP SERPL CALC-SCNC: 13 MMOL/L (ref 10–20)
APPEARANCE UR: CLEAR
AST SERPL W P-5'-P-CCNC: 16 U/L (ref 9–39)
BASE EXCESS BLDV CALC-SCNC: 4.9 MMOL/L (ref -2–3)
BASOPHILS # BLD AUTO: 0.01 X10*3/UL (ref 0–0.1)
BASOPHILS NFR BLD AUTO: 0.2 %
BILIRUB SERPL-MCNC: 0.6 MG/DL (ref 0–1.2)
BILIRUB UR STRIP.AUTO-MCNC: NEGATIVE MG/DL
BNP SERPL-MCNC: 260 PG/ML (ref 0–99)
BODY TEMPERATURE: ABNORMAL
BUN SERPL-MCNC: 39 MG/DL (ref 6–23)
CA-I BLDV-SCNC: 1.26 MMOL/L (ref 1.1–1.33)
CALCIUM SERPL-MCNC: 9.3 MG/DL (ref 8.6–10.3)
CARDIAC TROPONIN I PNL SERPL HS: 12 NG/L (ref 0–20)
CARDIAC TROPONIN I PNL SERPL HS: 13 NG/L (ref 0–20)
CHLORIDE BLDV-SCNC: 107 MMOL/L (ref 98–107)
CHLORIDE SERPL-SCNC: 104 MMOL/L (ref 98–107)
CO2 SERPL-SCNC: 26 MMOL/L (ref 21–32)
COLOR UR: YELLOW
CREAT SERPL-MCNC: 1.62 MG/DL (ref 0.5–1.3)
EGFRCR SERPLBLD CKD-EPI 2021: 42 ML/MIN/1.73M*2
EOSINOPHIL # BLD AUTO: 0.01 X10*3/UL (ref 0–0.4)
EOSINOPHIL NFR BLD AUTO: 0.2 %
ERYTHROCYTE [DISTWIDTH] IN BLOOD BY AUTOMATED COUNT: 14.6 % (ref 11.5–14.5)
FLUAV RNA RESP QL NAA+PROBE: NOT DETECTED
FLUBV RNA RESP QL NAA+PROBE: NOT DETECTED
GLUCOSE BLDV-MCNC: 137 MG/DL (ref 74–99)
GLUCOSE SERPL-MCNC: 132 MG/DL (ref 74–99)
GLUCOSE UR STRIP.AUTO-MCNC: NORMAL MG/DL
HCO3 BLDV-SCNC: 29.2 MMOL/L (ref 22–26)
HCT VFR BLD AUTO: 40.4 % (ref 41–52)
HCT VFR BLD EST: 39 % (ref 41–52)
HGB BLD-MCNC: 13.4 G/DL (ref 13.5–17.5)
HGB BLDV-MCNC: 12.9 G/DL (ref 13.5–17.5)
IMM GRANULOCYTES # BLD AUTO: 0.38 X10*3/UL (ref 0–0.5)
IMM GRANULOCYTES NFR BLD AUTO: 6.6 % (ref 0–0.9)
INHALED O2 CONCENTRATION: 21 %
INR PPP: 2.6 (ref 0.9–1.1)
KETONES UR STRIP.AUTO-MCNC: NEGATIVE MG/DL
LACTATE BLDV-SCNC: 1.3 MMOL/L (ref 0.4–2)
LACTATE SERPL-SCNC: 1.2 MMOL/L (ref 0.4–2)
LEUKOCYTE ESTERASE UR QL STRIP.AUTO: NEGATIVE
LYMPHOCYTES # BLD AUTO: 0.5 X10*3/UL (ref 0.8–3)
LYMPHOCYTES NFR BLD AUTO: 8.6 %
MAGNESIUM SERPL-MCNC: 1.86 MG/DL (ref 1.6–2.4)
MCH RBC QN AUTO: 30.9 PG (ref 26–34)
MCHC RBC AUTO-ENTMCNC: 33.2 G/DL (ref 32–36)
MCV RBC AUTO: 93 FL (ref 80–100)
MONOCYTES # BLD AUTO: 1.1 X10*3/UL (ref 0.05–0.8)
MONOCYTES NFR BLD AUTO: 19 %
NEUTROPHILS # BLD AUTO: 3.79 X10*3/UL (ref 1.6–5.5)
NEUTROPHILS NFR BLD AUTO: 65.4 %
NITRITE UR QL STRIP.AUTO: NEGATIVE
NRBC BLD-RTO: 0 /100 WBCS (ref 0–0)
OXYHGB MFR BLDV: 73.5 % (ref 45–75)
PCO2 BLDV: 41 MM HG (ref 41–51)
PH BLDV: 7.46 PH (ref 7.33–7.43)
PH UR STRIP.AUTO: 6 [PH]
PHOSPHATE SERPL-MCNC: 2.7 MG/DL (ref 2.5–4.9)
PLATELET # BLD AUTO: 134 X10*3/UL (ref 150–450)
PO2 BLDV: 42 MM HG (ref 35–45)
POTASSIUM BLDV-SCNC: 4.5 MMOL/L (ref 3.5–5.3)
POTASSIUM SERPL-SCNC: 4.3 MMOL/L (ref 3.5–5.3)
PROT SERPL-MCNC: 6.1 G/DL (ref 6.4–8.2)
PROT UR STRIP.AUTO-MCNC: NEGATIVE MG/DL
PROTHROMBIN TIME: 28.9 SECONDS (ref 9.8–12.4)
RBC # BLD AUTO: 4.34 X10*6/UL (ref 4.5–5.9)
RBC # UR STRIP.AUTO: NEGATIVE MG/DL
SAO2 % BLDV: 75 % (ref 45–75)
SARS-COV-2 RNA RESP QL NAA+PROBE: NOT DETECTED
SODIUM BLDV-SCNC: 138 MMOL/L (ref 136–145)
SODIUM SERPL-SCNC: 139 MMOL/L (ref 136–145)
SP GR UR STRIP.AUTO: 1.02
UROBILINOGEN UR STRIP.AUTO-MCNC: NORMAL MG/DL
WBC # BLD AUTO: 5.8 X10*3/UL (ref 4.4–11.3)

## 2025-07-03 PROCEDURE — 83735 ASSAY OF MAGNESIUM: CPT | Performed by: STUDENT IN AN ORGANIZED HEALTH CARE EDUCATION/TRAINING PROGRAM

## 2025-07-03 PROCEDURE — 99285 EMERGENCY DEPT VISIT HI MDM: CPT | Mod: 25 | Performed by: STUDENT IN AN ORGANIZED HEALTH CARE EDUCATION/TRAINING PROGRAM

## 2025-07-03 PROCEDURE — 85025 COMPLETE CBC W/AUTO DIFF WBC: CPT | Performed by: STUDENT IN AN ORGANIZED HEALTH CARE EDUCATION/TRAINING PROGRAM

## 2025-07-03 PROCEDURE — 96361 HYDRATE IV INFUSION ADD-ON: CPT

## 2025-07-03 PROCEDURE — 87040 BLOOD CULTURE FOR BACTERIA: CPT | Mod: GENLAB | Performed by: STUDENT IN AN ORGANIZED HEALTH CARE EDUCATION/TRAINING PROGRAM

## 2025-07-03 PROCEDURE — 96365 THER/PROPH/DIAG IV INF INIT: CPT

## 2025-07-03 PROCEDURE — 36415 COLL VENOUS BLD VENIPUNCTURE: CPT | Performed by: STUDENT IN AN ORGANIZED HEALTH CARE EDUCATION/TRAINING PROGRAM

## 2025-07-03 PROCEDURE — 93005 ELECTROCARDIOGRAM TRACING: CPT

## 2025-07-03 PROCEDURE — 84132 ASSAY OF SERUM POTASSIUM: CPT | Performed by: STUDENT IN AN ORGANIZED HEALTH CARE EDUCATION/TRAINING PROGRAM

## 2025-07-03 PROCEDURE — 83880 ASSAY OF NATRIURETIC PEPTIDE: CPT | Performed by: STUDENT IN AN ORGANIZED HEALTH CARE EDUCATION/TRAINING PROGRAM

## 2025-07-03 PROCEDURE — 71250 CT THORAX DX C-: CPT | Performed by: RADIOLOGY

## 2025-07-03 PROCEDURE — 81003 URINALYSIS AUTO W/O SCOPE: CPT | Performed by: STUDENT IN AN ORGANIZED HEALTH CARE EDUCATION/TRAINING PROGRAM

## 2025-07-03 PROCEDURE — 85610 PROTHROMBIN TIME: CPT | Performed by: STUDENT IN AN ORGANIZED HEALTH CARE EDUCATION/TRAINING PROGRAM

## 2025-07-03 PROCEDURE — 71250 CT THORAX DX C-: CPT

## 2025-07-03 PROCEDURE — 96375 TX/PRO/DX INJ NEW DRUG ADDON: CPT

## 2025-07-03 PROCEDURE — 84100 ASSAY OF PHOSPHORUS: CPT | Performed by: STUDENT IN AN ORGANIZED HEALTH CARE EDUCATION/TRAINING PROGRAM

## 2025-07-03 PROCEDURE — 2500000004 HC RX 250 GENERAL PHARMACY W/ HCPCS (ALT 636 FOR OP/ED): Performed by: STUDENT IN AN ORGANIZED HEALTH CARE EDUCATION/TRAINING PROGRAM

## 2025-07-03 PROCEDURE — 84484 ASSAY OF TROPONIN QUANT: CPT | Performed by: STUDENT IN AN ORGANIZED HEALTH CARE EDUCATION/TRAINING PROGRAM

## 2025-07-03 PROCEDURE — 87636 SARSCOV2 & INF A&B AMP PRB: CPT | Performed by: STUDENT IN AN ORGANIZED HEALTH CARE EDUCATION/TRAINING PROGRAM

## 2025-07-03 PROCEDURE — 83605 ASSAY OF LACTIC ACID: CPT | Performed by: STUDENT IN AN ORGANIZED HEALTH CARE EDUCATION/TRAINING PROGRAM

## 2025-07-03 RX ORDER — VANCOMYCIN HYDROCHLORIDE 1 G/200ML
INJECTION, SOLUTION INTRAVENOUS
Status: COMPLETED
Start: 2025-07-03 | End: 2025-07-03

## 2025-07-03 RX ORDER — LEVOTHYROXINE SODIUM 75 UG/1
75 TABLET ORAL DAILY
Qty: 90 TABLET | Refills: 0 | Status: SHIPPED | OUTPATIENT
Start: 2025-07-03

## 2025-07-03 RX ADMIN — VANCOMYCIN HYDROCHLORIDE 2 G: 1 INJECTION, POWDER, LYOPHILIZED, FOR SOLUTION INTRAVENOUS at 23:36

## 2025-07-03 RX ADMIN — SODIUM CHLORIDE, SODIUM LACTATE, POTASSIUM CHLORIDE, AND CALCIUM CHLORIDE 1000 ML: 600; 310; 30; 20 INJECTION, SOLUTION INTRAVENOUS at 21:50

## 2025-07-03 RX ADMIN — SODIUM CHLORIDE, SODIUM LACTATE, POTASSIUM CHLORIDE, AND CALCIUM CHLORIDE 1000 ML: 600; 310; 30; 20 INJECTION, SOLUTION INTRAVENOUS at 23:38

## 2025-07-03 RX ADMIN — PIPERACILLIN SODIUM AND TAZOBACTAM SODIUM 4.5 G: 4; .5 INJECTION, SOLUTION INTRAVENOUS at 22:27

## 2025-07-03 ASSESSMENT — PAIN SCALES - GENERAL
PAINLEVEL_OUTOF10: 0 - NO PAIN
PAINLEVEL_OUTOF10: 0 - NO PAIN

## 2025-07-03 ASSESSMENT — PAIN - FUNCTIONAL ASSESSMENT: PAIN_FUNCTIONAL_ASSESSMENT: 0-10

## 2025-07-04 PROBLEM — I50.22 CHRONIC SYSTOLIC HEART FAILURE: Status: ACTIVE | Noted: 2025-07-04

## 2025-07-04 LAB
ANION GAP SERPL CALC-SCNC: 12 MMOL/L (ref 10–20)
BASOPHILS # BLD MANUAL: 0 X10*3/UL (ref 0–0.1)
BASOPHILS NFR BLD MANUAL: 0 %
BUN SERPL-MCNC: 32 MG/DL (ref 6–23)
CALCIUM SERPL-MCNC: 8.4 MG/DL (ref 8.6–10.3)
CHLORIDE SERPL-SCNC: 106 MMOL/L (ref 98–107)
CO2 SERPL-SCNC: 26 MMOL/L (ref 21–32)
CREAT SERPL-MCNC: 1.61 MG/DL (ref 0.5–1.3)
EGFRCR SERPLBLD CKD-EPI 2021: 42 ML/MIN/1.73M*2
EOSINOPHIL # BLD MANUAL: 0.07 X10*3/UL (ref 0–0.4)
EOSINOPHIL NFR BLD MANUAL: 1 %
ERYTHROCYTE [DISTWIDTH] IN BLOOD BY AUTOMATED COUNT: 14.8 % (ref 11.5–14.5)
GLUCOSE SERPL-MCNC: 106 MG/DL (ref 74–99)
HCT VFR BLD AUTO: 36.4 % (ref 41–52)
HGB BLD-MCNC: 11.8 G/DL (ref 13.5–17.5)
IMM GRANULOCYTES # BLD AUTO: 0.57 X10*3/UL (ref 0–0.5)
IMM GRANULOCYTES NFR BLD AUTO: 7.9 % (ref 0–0.9)
INR PPP: 2.7 (ref 0.9–1.1)
LYMPHOCYTES # BLD MANUAL: 0.86 X10*3/UL (ref 0.8–3)
LYMPHOCYTES NFR BLD MANUAL: 12 %
MCH RBC QN AUTO: 30.3 PG (ref 26–34)
MCHC RBC AUTO-ENTMCNC: 32.4 G/DL (ref 32–36)
MCV RBC AUTO: 93 FL (ref 80–100)
MONOCYTES # BLD MANUAL: 1.58 X10*3/UL (ref 0.05–0.8)
MONOCYTES NFR BLD MANUAL: 22 %
MRSA DNA SPEC QL NAA+PROBE: DETECTED
NEUTROPHILS # BLD MANUAL: 4.68 X10*3/UL (ref 1.6–5.5)
NEUTS BAND # BLD MANUAL: 0.72 X10*3/UL (ref 0–0.5)
NEUTS BAND NFR BLD MANUAL: 10 %
NEUTS SEG # BLD MANUAL: 3.96 X10*3/UL (ref 1.6–5)
NEUTS SEG NFR BLD MANUAL: 55 %
NRBC BLD-RTO: 0 /100 WBCS (ref 0–0)
PLATELET # BLD AUTO: 91 X10*3/UL (ref 150–450)
POTASSIUM SERPL-SCNC: 4.2 MMOL/L (ref 3.5–5.3)
PROTHROMBIN TIME: 29.7 SECONDS (ref 9.8–12.4)
RBC # BLD AUTO: 3.9 X10*6/UL (ref 4.5–5.9)
RBC MORPH BLD: ABNORMAL
SODIUM SERPL-SCNC: 140 MMOL/L (ref 136–145)
TOTAL CELLS COUNTED BLD: 100
WBC # BLD AUTO: 7.2 X10*3/UL (ref 4.4–11.3)

## 2025-07-04 PROCEDURE — 2500000004 HC RX 250 GENERAL PHARMACY W/ HCPCS (ALT 636 FOR OP/ED): Mod: IPSPLIT

## 2025-07-04 PROCEDURE — 36415 COLL VENOUS BLD VENIPUNCTURE: CPT | Mod: IPSPLIT | Performed by: HOSPITALIST

## 2025-07-04 PROCEDURE — 80048 BASIC METABOLIC PNL TOTAL CA: CPT | Mod: IPSPLIT | Performed by: HOSPITALIST

## 2025-07-04 PROCEDURE — 2500000001 HC RX 250 WO HCPCS SELF ADMINISTERED DRUGS (ALT 637 FOR MEDICARE OP): Mod: IPSPLIT | Performed by: HOSPITALIST

## 2025-07-04 PROCEDURE — 87449 NOS EACH ORGANISM AG IA: CPT | Mod: GENLAB | Performed by: HOSPITALIST

## 2025-07-04 PROCEDURE — 85007 BL SMEAR W/DIFF WBC COUNT: CPT | Mod: IPSPLIT | Performed by: HOSPITALIST

## 2025-07-04 PROCEDURE — 2500000004 HC RX 250 GENERAL PHARMACY W/ HCPCS (ALT 636 FOR OP/ED): Mod: IPSPLIT | Performed by: HOSPITALIST

## 2025-07-04 PROCEDURE — 99223 1ST HOSP IP/OBS HIGH 75: CPT

## 2025-07-04 PROCEDURE — 94760 N-INVAS EAR/PLS OXIMETRY 1: CPT | Mod: IPSPLIT

## 2025-07-04 PROCEDURE — 85027 COMPLETE CBC AUTOMATED: CPT | Mod: IPSPLIT | Performed by: HOSPITALIST

## 2025-07-04 PROCEDURE — 1200000002 HC GENERAL ROOM WITH TELEMETRY DAILY: Mod: IPSPLIT

## 2025-07-04 PROCEDURE — 2500000005 HC RX 250 GENERAL PHARMACY W/O HCPCS: Mod: IPSPLIT | Performed by: HOSPITALIST

## 2025-07-04 PROCEDURE — 87899 AGENT NOS ASSAY W/OPTIC: CPT | Mod: GENLAB | Performed by: HOSPITALIST

## 2025-07-04 PROCEDURE — 85610 PROTHROMBIN TIME: CPT | Mod: IPSPLIT | Performed by: HOSPITALIST

## 2025-07-04 PROCEDURE — 87640 STAPH A DNA AMP PROBE: CPT | Mod: IPSPLIT | Performed by: HOSPITALIST

## 2025-07-04 PROCEDURE — 2500000002 HC RX 250 W HCPCS SELF ADMINISTERED DRUGS (ALT 637 FOR MEDICARE OP, ALT 636 FOR OP/ED): Mod: IPSPLIT | Performed by: HOSPITALIST

## 2025-07-04 RX ORDER — MUPIROCIN 20 MG/G
OINTMENT TOPICAL 3 TIMES DAILY
Status: DISCONTINUED | OUTPATIENT
Start: 2025-07-04 | End: 2025-07-05 | Stop reason: HOSPADM

## 2025-07-04 RX ORDER — POLYETHYLENE GLYCOL 3350 17 G/17G
17 POWDER, FOR SOLUTION ORAL 2 TIMES DAILY PRN
Status: DISCONTINUED | OUTPATIENT
Start: 2025-07-04 | End: 2025-07-05 | Stop reason: HOSPADM

## 2025-07-04 RX ORDER — VANCOMYCIN HYDROCHLORIDE 1 G/200ML
1 INJECTION, SOLUTION INTRAVENOUS EVERY 24 HOURS
Status: DISCONTINUED | OUTPATIENT
Start: 2025-07-04 | End: 2025-07-05

## 2025-07-04 RX ORDER — ACETAMINOPHEN 500 MG
10 TABLET ORAL DAILY PRN
Status: DISCONTINUED | OUTPATIENT
Start: 2025-07-04 | End: 2025-07-05 | Stop reason: HOSPADM

## 2025-07-04 RX ORDER — SODIUM CHLORIDE 9 MG/ML
INJECTION, SOLUTION INTRAVENOUS
Status: COMPLETED
Start: 2025-07-04 | End: 2025-07-04

## 2025-07-04 RX ORDER — ACYCLOVIR 200 MG/1
400 CAPSULE ORAL 2 TIMES DAILY
Status: DISCONTINUED | OUTPATIENT
Start: 2025-07-04 | End: 2025-07-05 | Stop reason: HOSPADM

## 2025-07-04 RX ORDER — ALLOPURINOL 300 MG/1
300 TABLET ORAL DAILY
Status: DISCONTINUED | OUTPATIENT
Start: 2025-07-04 | End: 2025-07-05 | Stop reason: HOSPADM

## 2025-07-04 RX ORDER — ACETAMINOPHEN 325 MG/1
650 TABLET ORAL EVERY 6 HOURS PRN
Status: DISCONTINUED | OUTPATIENT
Start: 2025-07-04 | End: 2025-07-05 | Stop reason: HOSPADM

## 2025-07-04 RX ORDER — ONDANSETRON HYDROCHLORIDE 2 MG/ML
4 INJECTION, SOLUTION INTRAVENOUS EVERY 4 HOURS PRN
Status: DISCONTINUED | OUTPATIENT
Start: 2025-07-04 | End: 2025-07-05 | Stop reason: HOSPADM

## 2025-07-04 RX ORDER — ALUMINUM HYDROXIDE, MAGNESIUM HYDROXIDE, AND SIMETHICONE 1200; 120; 1200 MG/30ML; MG/30ML; MG/30ML
20 SUSPENSION ORAL EVERY 4 HOURS PRN
Status: DISCONTINUED | OUTPATIENT
Start: 2025-07-04 | End: 2025-07-05 | Stop reason: HOSPADM

## 2025-07-04 RX ORDER — CARVEDILOL 25 MG/1
25 TABLET ORAL 2 TIMES DAILY
Status: DISCONTINUED | OUTPATIENT
Start: 2025-07-04 | End: 2025-07-05 | Stop reason: HOSPADM

## 2025-07-04 RX ORDER — VANCOMYCIN HYDROCHLORIDE 1 G/20ML
INJECTION, POWDER, LYOPHILIZED, FOR SOLUTION INTRAVENOUS DAILY PRN
Status: DISCONTINUED | OUTPATIENT
Start: 2025-07-04 | End: 2025-07-05 | Stop reason: HOSPADM

## 2025-07-04 RX ORDER — GUAIFENESIN 100 MG/5ML
200 LIQUID ORAL EVERY 4 HOURS PRN
Status: DISCONTINUED | OUTPATIENT
Start: 2025-07-04 | End: 2025-07-05 | Stop reason: HOSPADM

## 2025-07-04 RX ORDER — VANCOMYCIN HYDROCHLORIDE 1 G/200ML
INJECTION, SOLUTION INTRAVENOUS
Status: COMPLETED
Start: 2025-07-04 | End: 2025-07-04

## 2025-07-04 RX ORDER — ACYCLOVIR 400 MG/1
400 TABLET ORAL 2 TIMES DAILY
Status: DISCONTINUED | OUTPATIENT
Start: 2025-07-04 | End: 2025-07-04

## 2025-07-04 RX ORDER — SODIUM CHLORIDE 9 MG/ML
10 INJECTION, SOLUTION INTRAVENOUS CONTINUOUS PRN
Status: DISCONTINUED | OUTPATIENT
Start: 2025-07-04 | End: 2025-07-05 | Stop reason: HOSPADM

## 2025-07-04 RX ADMIN — MUPIROCIN: 20 OINTMENT TOPICAL at 21:18

## 2025-07-04 RX ADMIN — SODIUM CHLORIDE 250 ML: 9 INJECTION, SOLUTION INTRAVENOUS at 05:22

## 2025-07-04 RX ADMIN — PIPERACILLIN SODIUM AND TAZOBACTAM SODIUM 4.5 G: 4; .5 INJECTION, SOLUTION INTRAVENOUS at 14:20

## 2025-07-04 RX ADMIN — WARFARIN SODIUM 7.5 MG: 5 TABLET ORAL at 17:39

## 2025-07-04 RX ADMIN — ALLOPURINOL 300 MG: 300 TABLET ORAL at 09:57

## 2025-07-04 RX ADMIN — MUPIROCIN: 20 OINTMENT TOPICAL at 17:02

## 2025-07-04 RX ADMIN — VANCOMYCIN HYDROCHLORIDE 1 G: 1 INJECTION, SOLUTION INTRAVENOUS at 22:30

## 2025-07-04 RX ADMIN — ACYCLOVIR 400 MG: 200 CAPSULE ORAL at 09:57

## 2025-07-04 RX ADMIN — PIPERACILLIN SODIUM AND TAZOBACTAM SODIUM 4.5 G: 4; .5 INJECTION, SOLUTION INTRAVENOUS at 05:21

## 2025-07-04 RX ADMIN — PIPERACILLIN SODIUM AND TAZOBACTAM SODIUM 4.5 G: 4; .5 INJECTION, SOLUTION INTRAVENOUS at 21:18

## 2025-07-04 RX ADMIN — ACYCLOVIR 400 MG: 200 CAPSULE ORAL at 21:17

## 2025-07-04 RX ADMIN — MUPIROCIN: 20 OINTMENT TOPICAL at 09:57

## 2025-07-04 SDOH — SOCIAL STABILITY: SOCIAL NETWORK: HOW OFTEN DO YOU ATTEND CHURCH OR RELIGIOUS SERVICES?: NEVER

## 2025-07-04 SDOH — SOCIAL STABILITY: SOCIAL NETWORK: HOW OFTEN DO YOU ATTEND MEETINGS OF THE CLUBS OR ORGANIZATIONS YOU BELONG TO?: NEVER

## 2025-07-04 SDOH — SOCIAL STABILITY: SOCIAL NETWORK: IN A TYPICAL WEEK, HOW MANY TIMES DO YOU TALK ON THE PHONE WITH FAMILY, FRIENDS, OR NEIGHBORS?: ONCE A WEEK

## 2025-07-04 SDOH — HEALTH STABILITY: PHYSICAL HEALTH
HOW OFTEN DO YOU NEED TO HAVE SOMEONE HELP YOU WHEN YOU READ INSTRUCTIONS, PAMPHLETS, OR OTHER WRITTEN MATERIAL FROM YOUR DOCTOR OR PHARMACY?: SOMETIMES

## 2025-07-04 SDOH — HEALTH STABILITY: MENTAL HEALTH
DO YOU FEEL STRESS - TENSE, RESTLESS, NERVOUS, OR ANXIOUS, OR UNABLE TO SLEEP AT NIGHT BECAUSE YOUR MIND IS TROUBLED ALL THE TIME - THESE DAYS?: NOT AT ALL

## 2025-07-04 SDOH — ECONOMIC STABILITY: FOOD INSECURITY: WITHIN THE PAST 12 MONTHS, THE FOOD YOU BOUGHT JUST DIDN'T LAST AND YOU DIDN'T HAVE MONEY TO GET MORE.: NEVER TRUE

## 2025-07-04 SDOH — SOCIAL STABILITY: SOCIAL INSECURITY
WITHIN THE LAST YEAR, HAVE YOU BEEN RAPED OR FORCED TO HAVE ANY KIND OF SEXUAL ACTIVITY BY YOUR PARTNER OR EX-PARTNER?: NO

## 2025-07-04 SDOH — SOCIAL STABILITY: SOCIAL INSECURITY: WITHIN THE LAST YEAR, HAVE YOU BEEN HUMILIATED OR EMOTIONALLY ABUSED IN OTHER WAYS BY YOUR PARTNER OR EX-PARTNER?: NO

## 2025-07-04 SDOH — HEALTH STABILITY: PHYSICAL HEALTH: ON AVERAGE, HOW MANY MINUTES DO YOU ENGAGE IN EXERCISE AT THIS LEVEL?: 60 MIN

## 2025-07-04 SDOH — ECONOMIC STABILITY: FOOD INSECURITY: WITHIN THE PAST 12 MONTHS, YOU WORRIED THAT YOUR FOOD WOULD RUN OUT BEFORE YOU GOT THE MONEY TO BUY MORE.: NEVER TRUE

## 2025-07-04 SDOH — SOCIAL STABILITY: SOCIAL INSECURITY: DO YOU FEEL ANYONE HAS EXPLOITED OR TAKEN ADVANTAGE OF YOU FINANCIALLY OR OF YOUR PERSONAL PROPERTY?: NO

## 2025-07-04 SDOH — SOCIAL STABILITY: SOCIAL INSECURITY: WITHIN THE LAST YEAR, HAVE YOU BEEN AFRAID OF YOUR PARTNER OR EX-PARTNER?: NO

## 2025-07-04 SDOH — ECONOMIC STABILITY: INCOME INSECURITY: IN THE PAST 12 MONTHS HAS THE ELECTRIC, GAS, OIL, OR WATER COMPANY THREATENED TO SHUT OFF SERVICES IN YOUR HOME?: NO

## 2025-07-04 SDOH — SOCIAL STABILITY: SOCIAL INSECURITY: HAS ANYONE EVER THREATENED TO HURT YOUR FAMILY OR YOUR PETS?: NO

## 2025-07-04 SDOH — SOCIAL STABILITY: SOCIAL NETWORK
DO YOU BELONG TO ANY CLUBS OR ORGANIZATIONS SUCH AS CHURCH GROUPS, UNIONS, FRATERNAL OR ATHLETIC GROUPS, OR SCHOOL GROUPS?: NO

## 2025-07-04 SDOH — SOCIAL STABILITY: SOCIAL INSECURITY: HAVE YOU HAD ANY THOUGHTS OF HARMING ANYONE ELSE?: NO

## 2025-07-04 SDOH — SOCIAL STABILITY: SOCIAL NETWORK: HOW OFTEN DO YOU GET TOGETHER WITH FRIENDS OR RELATIVES?: ONCE A WEEK

## 2025-07-04 SDOH — SOCIAL STABILITY: SOCIAL INSECURITY
WITHIN THE LAST YEAR, HAVE YOU BEEN KICKED, HIT, SLAPPED, OR OTHERWISE PHYSICALLY HURT BY YOUR PARTNER OR EX-PARTNER?: NO

## 2025-07-04 SDOH — SOCIAL STABILITY: SOCIAL INSECURITY: HAVE YOU HAD THOUGHTS OF HARMING ANYONE ELSE?: NO

## 2025-07-04 SDOH — SOCIAL STABILITY: SOCIAL INSECURITY: ARE YOU MARRIED, WIDOWED, DIVORCED, SEPARATED, NEVER MARRIED, OR LIVING WITH A PARTNER?: MARRIED

## 2025-07-04 SDOH — SOCIAL STABILITY: SOCIAL INSECURITY: DOES ANYONE TRY TO KEEP YOU FROM HAVING/CONTACTING OTHER FRIENDS OR DOING THINGS OUTSIDE YOUR HOME?: NO

## 2025-07-04 SDOH — HEALTH STABILITY: PHYSICAL HEALTH: ON AVERAGE, HOW MANY DAYS PER WEEK DO YOU ENGAGE IN MODERATE TO STRENUOUS EXERCISE (LIKE A BRISK WALK)?: 3 DAYS

## 2025-07-04 SDOH — SOCIAL STABILITY: SOCIAL INSECURITY: WERE YOU ABLE TO COMPLETE ALL THE BEHAVIORAL HEALTH SCREENINGS?: YES

## 2025-07-04 SDOH — SOCIAL STABILITY: SOCIAL INSECURITY: ABUSE: ADULT

## 2025-07-04 SDOH — SOCIAL STABILITY: SOCIAL INSECURITY: ARE THERE ANY APPARENT SIGNS OF INJURIES/BEHAVIORS THAT COULD BE RELATED TO ABUSE/NEGLECT?: NO

## 2025-07-04 SDOH — SOCIAL STABILITY: SOCIAL INSECURITY: DO YOU FEEL UNSAFE GOING BACK TO THE PLACE WHERE YOU ARE LIVING?: NO

## 2025-07-04 SDOH — SOCIAL STABILITY: SOCIAL INSECURITY: ARE YOU OR HAVE YOU BEEN THREATENED OR ABUSED PHYSICALLY, EMOTIONALLY, OR SEXUALLY BY ANYONE?: NO

## 2025-07-04 ASSESSMENT — COGNITIVE AND FUNCTIONAL STATUS - GENERAL
CLIMB 3 TO 5 STEPS WITH RAILING: A LITTLE
DAILY ACTIVITIY SCORE: 24
DAILY ACTIVITIY SCORE: 24
WALKING IN HOSPITAL ROOM: A LITTLE
WALKING IN HOSPITAL ROOM: A LITTLE
PATIENT BASELINE BEDBOUND: NO
MOBILITY SCORE: 22
CLIMB 3 TO 5 STEPS WITH RAILING: A LITTLE
MOBILITY SCORE: 22

## 2025-07-04 ASSESSMENT — ACTIVITIES OF DAILY LIVING (ADL)
FEEDING YOURSELF: INDEPENDENT
BATHING: INDEPENDENT
HEARING - RIGHT EAR: HEARING AID
WALKS IN HOME: INDEPENDENT
LACK_OF_TRANSPORTATION: NO
JUDGMENT_ADEQUATE_SAFELY_COMPLETE_DAILY_ACTIVITIES: YES
HEARING - LEFT EAR: HEARING AID
PATIENT'S MEMORY ADEQUATE TO SAFELY COMPLETE DAILY ACTIVITIES?: YES
DRESSING YOURSELF: INDEPENDENT
TOILETING: INDEPENDENT
GROOMING: INDEPENDENT
ADEQUATE_TO_COMPLETE_ADL: YES

## 2025-07-04 ASSESSMENT — LIFESTYLE VARIABLES
AUDIT-C TOTAL SCORE: 4
HOW OFTEN DURING THE LAST YEAR HAVE YOU NEEDED AN ALCOHOLIC DRINK FIRST THING IN THE MORNING TO GET YOURSELF GOING AFTER A NIGHT OF HEAVY DRINKING: NEVER
AUDIT TOTAL SCORE: 0
SKIP TO QUESTIONS 9-10: 1
HOW OFTEN DURING THE LAST YEAR HAVE YOU HAD A FEELING OF GUILT OR REMORSE AFTER DRINKING: NEVER
AUDIT-C TOTAL SCORE: 4
HOW OFTEN DO YOU HAVE A DRINK CONTAINING ALCOHOL: 4 OR MORE TIMES A WEEK
HOW OFTEN DURING THE LAST YEAR HAVE YOU FOUND THAT YOU WERE NOT ABLE TO STOP DRINKING ONCE YOU HAD STARTED: NEVER
AUDIT TOTAL SCORE: 4
SUBSTANCE_ABUSE_PAST_12_MONTHS: NO
HOW OFTEN DURING THE LAST YEAR HAVE YOU FAILED TO DO WHAT WAS NORMALLY EXPECTED FROM YOU BECAUSE OF DRINKING: NEVER
HOW OFTEN DO YOU HAVE 6 OR MORE DRINKS ON ONE OCCASION: NEVER
PRESCIPTION_ABUSE_PAST_12_MONTHS: NO
HOW OFTEN DURING THE LAST YEAR HAVE YOU BEEN UNABLE TO REMEMBER WHAT HAPPENED THE NIGHT BEFORE BECAUSE YOU HAD BEEN DRINKING: NEVER
HAS A RELATIVE, FRIEND, DOCTOR, OR ANOTHER HEALTH PROFESSIONAL EXPRESSED CONCERN ABOUT YOUR DRINKING OR SUGGESTED YOU CUT DOWN: NO
HAVE YOU OR SOMEONE ELSE BEEN INJURED AS A RESULT OF YOUR DRINKING: NO
HOW MANY STANDARD DRINKS CONTAINING ALCOHOL DO YOU HAVE ON A TYPICAL DAY: 1 OR 2

## 2025-07-04 ASSESSMENT — ENCOUNTER SYMPTOMS
NEUROLOGICAL NEGATIVE: 1
EYES NEGATIVE: 1
ALLERGIC/IMMUNOLOGIC NEGATIVE: 1
HEMATOLOGIC/LYMPHATIC NEGATIVE: 1
CARDIOVASCULAR NEGATIVE: 1
ENDOCRINE NEGATIVE: 1
PSYCHIATRIC NEGATIVE: 1
RESPIRATORY NEGATIVE: 1
MUSCULOSKELETAL NEGATIVE: 1
FEVER: 1
CHILLS: 1
GASTROINTESTINAL NEGATIVE: 1

## 2025-07-04 ASSESSMENT — PAIN SCALES - GENERAL
PAINLEVEL_OUTOF10: 0 - NO PAIN

## 2025-07-04 ASSESSMENT — PAIN - FUNCTIONAL ASSESSMENT: PAIN_FUNCTIONAL_ASSESSMENT: 0-10

## 2025-07-04 NOTE — NURSING NOTE
Pt arrived to the floor, is alert and oriented, ambulatory, continent of bowel and bladder. Pt has a wound to [facility] upper chest, healing, where a pacemaker was placed. Current telemetry appears to have a-fib, is v-paced, and has a rt bundle branch block. Iv vancomycin and LR at 100 upon arrival from ED. MRSA swab was obtained and sent to lab. Urine is ordered for legionella and streptococcus testing, will obtain upon next void. Pt is resting in bed, is hard of hgearing.

## 2025-07-04 NOTE — CARE PLAN
The patient's goals for the shift include  to go home I feel better     The clinical goals for the shift include Pt will remain free from falls and injury throughout the shift and tolerate IV ATB.      Pt tolerated IV ATB throughout this shift. Pt had stable vitals and no complaints of pain. Free from falls ans injury. Wife at bedside. Call light within reach.     Problem: Pain - Adult  Goal: Verbalizes/displays adequate comfort level or baseline comfort level  Outcome: Progressing     Problem: Safety - Adult  Goal: Free from fall injury  Outcome: Progressing     Problem: Discharge Planning  Goal: Discharge to home or other facility with appropriate resources  Outcome: Progressing     Problem: Chronic Conditions and Co-morbidities  Goal: Patient's chronic conditions and co-morbidity symptoms are monitored and maintained or improved  Outcome: Progressing     Problem: Nutrition  Goal: Nutrient intake appropriate for maintaining nutritional needs  Outcome: Progressing

## 2025-07-04 NOTE — CARE PLAN
The patient's goals for the shift include      The clinical goals for the shift include no falls or injuries tonight, pt will remain afebrile tonight    Pt remained free from fall/injury, did use call light to get up to bathroom, ambulates with steady gait, no fever noted since admission, pt did tolerate iv infusions/antibiotics without adverse side effeccts.

## 2025-07-04 NOTE — CONSULTS
Vancomycin Dosing by Pharmacy- INITIAL    Donavon Brush is a 84 y.o. year old male who Pharmacy has been consulted for vancomycin dosing for pneumonia. Based on the patient's indication and renal status this patient will be dosed based on a goal AUC of 400-600.     Renal function is currently stable.    Visit Vitals  BP (!) 101/39 (BP Location: Right arm, Patient Position: Lying) Comment: nurse notified   Pulse 70   Temp 36.6 °C (97.9 °F) (Temporal)   Resp 17        Lab Results   Component Value Date    CREATININE 1.61 (H) 2025    CREATININE 1.62 (H) 2025    CREATININE 1.38 (H) 2025    CREATININE 1.46 (H) 2025    CREATININE 1.48 (H) 2025        Patient weight is as follows:   Vitals:    25 0042   Weight: 82.5 kg (181 lb 14.1 oz)       Cultures:  No results found for the encounter in last 14 days.        I/O last 3 completed shifts:  In: 2740 (33.2 mL/kg) [IV Piggyback:2740]  Out: - (0 mL/kg)   Weight: 82.5 kg   I/O during current shift:  No intake/output data recorded.    Temp (24hrs), Av.9 °C (100.2 °F), Min:36.6 °C (97.9 °F), Max:38.7 °C (101.6 °F)         Assessment/Plan     Patient has already been given a loading dose of 2000 mg last night.  Will initiate vancomycin maintenance, 1000 mg every 24 hours.    This dosing regimen is predicted by [employer] to result in the following pharmacokinetic parameters:  Loading dose: N/A  Regimen: 1000 mg IV every 24 hours.  Start time: 23:36 on 2025  Exposure target: AUC24 (range) 400-600 mg/L.hr   KVD80-98: 498 mg/L.hr  AUC24,ss: 542 mg/L.hr  Probability of AUC24 > 400: 79 %  Ctrough,ss: 17.8 mg/L  Probability of Ctrough,ss > 20: 40 %    Follow-up level will be ordered on 25 at 0500 unless clinically indicated sooner.  Will continue to monitor renal function daily while on vancomycin and order serum creatinine at least every 48 hours if not already ordered.  Follow for continued vancomycin needs, clinical response, and  signs/symptoms of toxicity.       Aracely Garza, PharmD

## 2025-07-04 NOTE — ED TRIAGE NOTES
Patient was outside for about 6 hours today he is a chemo patient that is being treated for lymphoma. Fever is only symptom

## 2025-07-04 NOTE — H&P
History Of Present Illness  Donavon Brush is a 84 y.o. male presenting with fever and chills. Pt states that he was outside on his tractor yesterday mowing for about 6 hours. He states that when he came inside, he had the chills and could not get warm. He then shortly after felt very hot and came to the ED. Pt was found to be febrile in the ED. Pt denies any cp, sob, or cough. Imaging revealed pneumonia and pt was admitted to [facility] care. Upon examination, pt states he feels good and is anxious to go home. Discussed plan of care, pt is agreeable.     ED VS: T38.7, HR 70, RR 18, /53, Sp02 96%RA    Imaging: CT chest- Dense consolidative opacity in the right upper lobe compatible with  pneumonia.  11 mm focal nodular opacity in the right upper lobe and also 6 mm  discrete pulmonary nodule in the left lower lobe. Several mildly  prominent mediastinal lymph nodes are also noted. The patient has  known history of lymphoma and progress imaging is recommended to  better assess patient's underlying disease.    Labs: Glu 132, Na 139, K 4.3, Bun/creat 39/1.62, , Trop 12, WBC 5.8, H/H 13.4/40.4, Plt 134      Past Medical History  Medical History[1]    Surgical History  Surgical History[2]     Social History  He reports that he quit smoking about 25 years ago. His smoking use included cigarettes. He has never used smokeless tobacco. He reports current alcohol use of about 7.0 standard drinks of alcohol per week. He reports that he does not use drugs.    Family History  Family History[3]     Allergies  Patient has no known allergies.    Review of Systems   Constitutional:  Positive for chills and fever.   HENT: Negative.     Eyes: Negative.    Respiratory: Negative.     Cardiovascular: Negative.    Gastrointestinal: Negative.    Endocrine: Negative.    Genitourinary: Negative.    Musculoskeletal: Negative.    Skin: Negative.    Allergic/Immunologic: Negative.    Neurological: Negative.    Hematological:  "Negative.    Psychiatric/Behavioral: Negative.          Physical Exam  Vitals reviewed.   HENT:      Head: Normocephalic and atraumatic.      Right Ear: External ear normal.      Left Ear: External ear normal.      Ears:      Comments: [place]      Nose: Nose normal.      Mouth/Throat:      Pharynx: Oropharynx is clear.   Eyes:      Conjunctiva/sclera: Conjunctivae normal.   Cardiovascular:      Rate and Rhythm: Normal rate and regular rhythm.      Pulses: Normal pulses.      Heart sounds: Normal heart sounds.   Pulmonary:      Comments: Crackles right side   Abdominal:      General: Bowel sounds are normal.      Palpations: Abdomen is soft.   Musculoskeletal:         General: Normal range of motion.      Cervical back: Normal range of motion and neck supple.   Skin:     General: Skin is dry.   Neurological:      General: No focal deficit present.      Mental Status: He is alert and oriented to person, place, and time.   Psychiatric:         Mood and Affect: Mood normal.         Behavior: Behavior normal.       Last Recorded Vitals  Blood pressure (!) 101/39, pulse 70, temperature 36.6 °C (97.9 °F), temperature source Temporal, resp. rate 17, height 1.702 m (5' 7\"), weight 82.5 kg (181 lb 14.1 oz), SpO2 91%.    Relevant Results  Scheduled medications  Scheduled Medications[4]  Continuous medications  Continuous Medications[5]  PRN medications  PRN Medications[6]    Results for orders placed or performed during the hospital encounter of 07/03/25 (from the past 24 hours)   CBC and Auto Differential   Result Value Ref Range    WBC 5.8 4.4 - 11.3 x10*3/uL    nRBC 0.0 0.0 - 0.0 /100 WBCs    RBC 4.34 (L) 4.50 - 5.90 x10*6/uL    Hemoglobin 13.4 (L) 13.5 - 17.5 g/dL    Hematocrit 40.4 (L) 41.0 - 52.0 %    MCV 93 80 - 100 fL    MCH 30.9 26.0 - 34.0 pg    MCHC 33.2 32.0 - 36.0 g/dL    RDW 14.6 (H) 11.5 - 14.5 %    Platelets 134 (L) 150 - 450 x10*3/uL    Neutrophils % 65.4 40.0 - 80.0 %    Immature Granulocytes %, Automated 6.6 " (H) 0.0 - 0.9 %    Lymphocytes % 8.6 13.0 - 44.0 %    Monocytes % 19.0 2.0 - 10.0 %    Eosinophils % 0.2 0.0 - 6.0 %    Basophils % 0.2 0.0 - 2.0 %    Neutrophils Absolute 3.79 1.60 - 5.50 x10*3/uL    Immature Granulocytes Absolute, Automated 0.38 0.00 - 0.50 x10*3/uL    Lymphocytes Absolute 0.50 (L) 0.80 - 3.00 x10*3/uL    Monocytes Absolute 1.10 (H) 0.05 - 0.80 x10*3/uL    Eosinophils Absolute 0.01 0.00 - 0.40 x10*3/uL    Basophils Absolute 0.01 0.00 - 0.10 x10*3/uL   Phosphorus   Result Value Ref Range    Phosphorus 2.7 2.5 - 4.9 mg/dL   Magnesium   Result Value Ref Range    Magnesium 1.86 1.60 - 2.40 mg/dL   Comprehensive metabolic panel   Result Value Ref Range    Glucose 132 (H) 74 - 99 mg/dL    Sodium 139 136 - 145 mmol/L    Potassium 4.3 3.5 - 5.3 mmol/L    Chloride 104 98 - 107 mmol/L    Bicarbonate 26 21 - 32 mmol/L    Anion Gap 13 10 - 20 mmol/L    Urea Nitrogen 39 (H) 6 - 23 mg/dL    Creatinine 1.62 (H) 0.50 - 1.30 mg/dL    eGFR 42 (L) >60 mL/min/1.73m*2    Calcium 9.3 8.6 - 10.3 mg/dL    Albumin 3.9 3.4 - 5.0 g/dL    Alkaline Phosphatase 56 33 - 136 U/L    Total Protein 6.1 (L) 6.4 - 8.2 g/dL    AST 16 9 - 39 U/L    Bilirubin, Total 0.6 0.0 - 1.2 mg/dL    ALT 15 10 - 52 U/L   Lactate   Result Value Ref Range    Lactate 1.2 0.4 - 2.0 mmol/L   Protime-INR   Result Value Ref Range    Protime 28.9 (H) 9.8 - 12.4 seconds    INR 2.6 (H) 0.9 - 1.1   B-Type Natriuretic Peptide   Result Value Ref Range     (H) 0 - 99 pg/mL   Blood Culture    Specimen: Peripheral Venipuncture; Blood culture   Result Value Ref Range    Blood Culture Loaded on Instrument - Culture in progress    Blood Culture    Specimen: Peripheral Venipuncture; Blood culture   Result Value Ref Range    Blood Culture Loaded on Instrument - Culture in progress    Troponin I, High Sensitivity, Initial   Result Value Ref Range    Troponin I, High Sensitivity 12 0 - 20 ng/L   Blood Gas Venous Full Panel   Result Value Ref Range    POCT pH,  Venous 7.46 (H) 7.33 - 7.43 pH    POCT pCO2, Venous 41 41 - 51 mm Hg    POCT pO2, Venous 42 35 - 45 mm Hg    POCT SO2, Venous 75 45 - 75 %    POCT Oxy Hemoglobin, Venous 73.5 45.0 - 75.0 %    POCT Hematocrit Calculated, Venous 39.0 (L) 41.0 - 52.0 %    POCT Sodium, Venous 138 136 - 145 mmol/L    POCT Potassium, Venous 4.5 3.5 - 5.3 mmol/L    POCT Chloride, Venous 107 98 - 107 mmol/L    POCT Ionized [place], Venous 1.26 1.10 - 1.33 mmol/L    POCT Glucose, Venous 137 (H) 74 - 99 mg/dL    POCT Lactate, Venous 1.3 0.4 - 2.0 mmol/L    POCT Base Excess, Venous 4.9 (H) -2.0 - 3.0 mmol/L    POCT HCO3 Calculated, Venous 29.2 (H) 22.0 - 26.0 mmol/L    POCT Hemoglobin, Venous 12.9 (L) 13.5 - 17.5 g/dL    POCT Anion Gap, Venous 6.0 (L) 10.0 - 25.0 mmol/L    Patient Temperature      FiO2 21 %   Sars-CoV-2 PCR   Result Value Ref Range    Coronavirus 2019, PCR Not Detected Not Detected   Influenza A, and B PCR   Result Value Ref Range    Flu A Result Not Detected Not Detected    Flu B Result Not Detected Not Detected   Urinalysis with Reflex Culture and Microscopic   Result Value Ref Range    Color, Urine Yellow Light-Yellow, Yellow, Dark-Yellow    Appearance, Urine Clear Clear    Specific Gravity, Urine 1.025 1.005 - 1.035    pH, Urine 6.0 5.0, 5.5, 6.0, 6.5, 7.0, 7.5, 8.0    Protein, Urine NEGATIVE NEGATIVE, 10 (TRACE), 20 (TRACE) mg/dL    Glucose, Urine Normal Normal mg/dL    Blood, Urine NEGATIVE NEGATIVE mg/dL    Ketones, Urine NEGATIVE NEGATIVE mg/dL    Bilirubin, Urine NEGATIVE NEGATIVE mg/dL    Urobilinogen, Urine Normal Normal mg/dL    Nitrite, Urine NEGATIVE NEGATIVE    Leukocyte Esterase, Urine NEGATIVE NEGATIVE   Troponin, High Sensitivity, 1 Hour   Result Value Ref Range    Troponin I, High Sensitivity 13 0 - 20 ng/L   MRSA Surveillance for Vancomycin De-escalation, PCR    Specimen: Anterior Nares; Swab   Result Value Ref Range    MRSA PCR Detected (A) Not Detected   Protime-INR   Result Value Ref Range    Protime  29.7 (H) 9.8 - 12.4 seconds    INR 2.7 (H) 0.9 - 1.1   Basic Metabolic Panel   Result Value Ref Range    Glucose 106 (H) 74 - 99 mg/dL    Sodium 140 136 - 145 mmol/L    Potassium 4.2 3.5 - 5.3 mmol/L    Chloride 106 98 - 107 mmol/L    Bicarbonate 26 21 - 32 mmol/L    Anion Gap 12 10 - 20 mmol/L    Urea Nitrogen 32 (H) 6 - 23 mg/dL    Creatinine 1.61 (H) 0.50 - 1.30 mg/dL    eGFR 42 (L) >60 mL/min/1.73m*2    Calcium 8.4 (L) 8.6 - 10.3 mg/dL   CBC and Auto Differential   Result Value Ref Range    WBC 7.2 4.4 - 11.3 x10*3/uL    nRBC 0.0 0.0 - 0.0 /100 WBCs    RBC 3.90 (L) 4.50 - 5.90 x10*6/uL    Hemoglobin 11.8 (L) 13.5 - 17.5 g/dL    Hematocrit 36.4 (L) 41.0 - 52.0 %    MCV 93 80 - 100 fL    MCH 30.3 26.0 - 34.0 pg    MCHC 32.4 32.0 - 36.0 g/dL    RDW 14.8 (H) 11.5 - 14.5 %    Platelets 91 (L) 150 - 450 x10*3/uL    Immature Granulocytes %, Automated 7.9 (H) 0.0 - 0.9 %    Immature Granulocytes Absolute, Automated 0.57 (H) 0.00 - 0.50 x10*3/uL   Manual Differential   Result Value Ref Range    Neutrophils %, Manual 55.0 40.0 - 80.0 %    Bands %, Manual 10.0 0.0 - 5.0 %    Lymphocytes %, Manual 12.0 13.0 - 44.0 %    Monocytes %, Manual 22.0 2.0 - 10.0 %    Eosinophils %, Manual 1.0 0.0 - 6.0 %    Basophils %, Manual 0.0 0.0 - 2.0 %    Seg Neutrophils Absolute, Manual 3.96 1.60 - 5.00 x10*3/uL    Bands Absolute, Manual 0.72 (H) 0.00 - 0.50 x10*3/uL    Lymphocytes Absolute, Manual 0.86 0.80 - 3.00 x10*3/uL    Monocytes Absolute, Manual 1.58 (H) 0.05 - 0.80 x10*3/uL    Eosinophils Absolute, Manual 0.07 0.00 - 0.40 x10*3/uL    Basophils Absolute, Manual 0.00 0.00 - 0.10 x10*3/uL    Total Cells Counted 100     Neutrophils Absolute, Manual 4.68 1.60 - 5.50 x10*3/uL    RBC Morphology No significant RBC morphology present      Assessment & Plan  Acute pneumonia    Paroxysmal atrial fibrillation (Multi)    Chronic systolic heart failure    Pacemaker    HTN (hypertension)    Orbital mass      #Pneumonia   -Febrile, tachycardic in  ED; borderline hypotensive   -CT chest: Dense consolidative opacity in the right upper lobe compatible with  pneumonia.  11 mm focal nodular opacity in the right upper lobe and also 6 mm  discrete pulmonary nodule in the left lower lobe. Several mildly  prominent mediastinal lymph nodes are also noted. The patient has  known history of lymphoma and progress imaging is recommended to  better assess patient's underlying disease.  -Flu/covid neg   -VB.46/41/42/29.2  -WBC 5.8   -Lactate 1.2  -Blood cx pending   -Given 2L of IVF in ED  -IV vanc, zosyn   -MRSA nares positive  -Start mupirocin   -Stable on RA     #Atrial fibrillation  #Chronic systolic heart failure   #S/P PPM (battery change 3 wks ago)   #Essential HTN  -Echo : 1. The left ventricular systolic function is normal, with a visually estimated ejection fraction of 55%.   2. Spectral Doppler shows an abnormal pattern of left ventricular diastolic filling.   3. There is moderately increased posterior left ventricular wall thickness.   4. The left atrial size is severely dilated.   5. The right atrium is moderately dilated.   6. Mild to moderate tricuspid regurgitation visualized.   7. Mild aortic valve regurgitation. 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 Mild to moderately elevated pulmonary artery pressure, estimated RVSP 50mmHg.   9. There is mild to moderate mitral valve stenosis.  -Trop 12 > 13   -Entresto on hold for hypotension  -Continue warfarin   -INR 2.7 today   -Daily INR while inpatient     #Orbital Lymphoma  -Continue venetoclax     DVT ppx  -warfarin    F: PRN  E: Replete per protocol  N: Regular   A: PIV    Disposition: Pt requires more than 2 inpatient days at this time   Code Status: Full Code       ROGERIO Graff  Attending Attestation:    Patient was seen and examined face to face, history and physical was taken personally at bedside the APRN-CNP, was present for the whole duration of the exam who participated in  the documentation of this note. I performed the medical decision-making components (assessment and plan of care). I have reviewed the documentation and verified the findings in the note as written with additions or exceptions as stated in the body of this note.  Patient laying in bed in no distress, he says he cough but not frequently no production, he is febrile his temperature was 101.6, he denies having any dysphagia, choking or coughing after eating or drinking, on exam he does have decreased breath sound lower bases but there are some crackles in middle part of the lungs more on the right side.  CT scan of the lung showing some significant infiltrate in the right lung mid part of the lung, very suspicious for aspiration, patient denies having dysphagia, will continue with IV antibiotics, bronchodilators, O2, will get also swallowing evaluation, patient needs to have another CT scan in few weeks to make sure the infiltrate is resolving.    Dr. Adelaide Aguirre MD  Internal Medicine        [1]   Past Medical History:  Diagnosis Date    A-fib (Multi)     Chronic sinusitis, unspecified 05/11/2017    Sinobronchitis    Coronary artery disease     Disease of thyroid gland     Eye cancer, left (Multi)     Hypertension     Hypothyroidism     Hypothyroidism, unspecified 10/12/2022    Adult hypothyroidism    Other specified disorders of eye and adnexa 05/08/2019    Irritation of right eye    Personal history of other diseases of the respiratory system 04/10/2015    History of acute bronchitis    Personal history of other diseases of the respiratory system 06/14/2016    History of acute sinusitis   [2]   Past Surgical History:  Procedure Laterality Date    AORTIC VALVE REPLACEMENT  2003    CARDIAC ELECTROPHYSIOLOGY PROCEDURE N/A 6/23/2025    Procedure: PPM Generator Change;  Surgeon: Jag Mendoza MD;  Location: [facility];  Service: Electrophysiology;  Laterality: N/A;  CPT 22954  DX: [facility]  [facility] Dual PPM     CARDIAC PACEMAKER PLACEMENT  09/22/2014    Pacemaker Placement    CATARACT EXTRACTION Bilateral     FRACTURE SURGERY      shoulder fracture    KNEE ARTHROPLASTY Left     PROSTATE SURGERY      TURP   [3]   Family History  Problem Relation Name Age of Onset    Alzheimer's disease Mother          passed at 84    No Known Problems Father          passed at 66    Heart disease Brother Enrrique     No Known Problems Daughter      No Known Problems Son      No Known Problems Son     [4] acyclovir, 400 mg, oral, BID  allopurinol, 300 mg, oral, Daily  [Held by provider] carvedilol, 25 mg, oral, BID  mupirocin, , Topical, TID  piperacillin-tazobactam, 4.5 g, intravenous, q8h  [Held by provider] sacubitriL-valsartan, 1 tablet, oral, BID  vancomycin, 1 g, intravenous, q24h  venetoclax, 200 mg, oral, Daily  warfarin, 7.5 mg, oral, Daily  [5] sodium chloride 0.9%, 10 mL/hr  [6] PRN medications: acetaminophen, alum-mag hydroxide-simeth, benzocaine-menthol, guaiFENesin, melatonin, ondansetron, polyethylene glycol, sodium chloride 0.9%, vancomycin

## 2025-07-05 VITALS
HEART RATE: 67 BPM | SYSTOLIC BLOOD PRESSURE: 127 MMHG | WEIGHT: 181.88 LBS | TEMPERATURE: 97.5 F | RESPIRATION RATE: 14 BRPM | OXYGEN SATURATION: 97 % | DIASTOLIC BLOOD PRESSURE: 62 MMHG | HEIGHT: 67 IN | BODY MASS INDEX: 28.55 KG/M2

## 2025-07-05 LAB
ANION GAP SERPL CALC-SCNC: 11 MMOL/L (ref 10–20)
BUN SERPL-MCNC: 24 MG/DL (ref 6–23)
CALCIUM SERPL-MCNC: 8.6 MG/DL (ref 8.6–10.3)
CHLORIDE SERPL-SCNC: 105 MMOL/L (ref 98–107)
CO2 SERPL-SCNC: 26 MMOL/L (ref 21–32)
CREAT SERPL-MCNC: 1.44 MG/DL (ref 0.5–1.3)
EGFRCR SERPLBLD CKD-EPI 2021: 48 ML/MIN/1.73M*2
ERYTHROCYTE [DISTWIDTH] IN BLOOD BY AUTOMATED COUNT: 14.6 % (ref 11.5–14.5)
GLUCOSE SERPL-MCNC: 133 MG/DL (ref 74–99)
HCT VFR BLD AUTO: 36.4 % (ref 41–52)
HGB BLD-MCNC: 12 G/DL (ref 13.5–17.5)
INR PPP: 2.3 (ref 0.9–1.1)
LEGIONELLA AG UR QL: NEGATIVE
MCH RBC QN AUTO: 31.1 PG (ref 26–34)
MCHC RBC AUTO-ENTMCNC: 33 G/DL (ref 32–36)
MCV RBC AUTO: 94 FL (ref 80–100)
NRBC BLD-RTO: ABNORMAL /100{WBCS}
PLATELET # BLD AUTO: 97 X10*3/UL (ref 150–450)
POTASSIUM SERPL-SCNC: 3.7 MMOL/L (ref 3.5–5.3)
PROTHROMBIN TIME: 25.1 SECONDS (ref 9.8–12.4)
RBC # BLD AUTO: 3.86 X10*6/UL (ref 4.5–5.9)
S PNEUM AG UR QL: NEGATIVE
SODIUM SERPL-SCNC: 138 MMOL/L (ref 136–145)
VANCOMYCIN SERPL-MCNC: 15.5 UG/ML (ref 5–20)
WBC # BLD AUTO: 6.4 X10*3/UL (ref 4.4–11.3)

## 2025-07-05 PROCEDURE — 99239 HOSP IP/OBS DSCHRG MGMT >30: CPT | Performed by: NURSE PRACTITIONER

## 2025-07-05 PROCEDURE — 36415 COLL VENOUS BLD VENIPUNCTURE: CPT | Mod: IPSPLIT

## 2025-07-05 PROCEDURE — 85610 PROTHROMBIN TIME: CPT | Mod: IPSPLIT | Performed by: HOSPITALIST

## 2025-07-05 PROCEDURE — 2500000001 HC RX 250 WO HCPCS SELF ADMINISTERED DRUGS (ALT 637 FOR MEDICARE OP): Mod: IPSPLIT | Performed by: NURSE PRACTITIONER

## 2025-07-05 PROCEDURE — 2500000004 HC RX 250 GENERAL PHARMACY W/ HCPCS (ALT 636 FOR OP/ED): Mod: IPSPLIT | Performed by: HOSPITALIST

## 2025-07-05 PROCEDURE — 80202 ASSAY OF VANCOMYCIN: CPT | Mod: IPSPLIT | Performed by: HOSPITALIST

## 2025-07-05 PROCEDURE — 82374 ASSAY BLOOD CARBON DIOXIDE: CPT | Mod: IPSPLIT

## 2025-07-05 PROCEDURE — 2500000001 HC RX 250 WO HCPCS SELF ADMINISTERED DRUGS (ALT 637 FOR MEDICARE OP): Mod: IPSPLIT | Performed by: HOSPITALIST

## 2025-07-05 PROCEDURE — 85027 COMPLETE CBC AUTOMATED: CPT | Mod: IPSPLIT

## 2025-07-05 PROCEDURE — 2500000002 HC RX 250 W HCPCS SELF ADMINISTERED DRUGS (ALT 637 FOR MEDICARE OP, ALT 636 FOR OP/ED): Mod: IPSPLIT | Performed by: NURSE PRACTITIONER

## 2025-07-05 RX ORDER — AZITHROMYCIN 250 MG/1
500 TABLET, FILM COATED ORAL ONCE
Status: COMPLETED | OUTPATIENT
Start: 2025-07-05 | End: 2025-07-05

## 2025-07-05 RX ORDER — AZITHROMYCIN 250 MG/1
TABLET, FILM COATED ORAL
Qty: 6 TABLET | Refills: 0 | Status: SHIPPED | OUTPATIENT
Start: 2025-07-05

## 2025-07-05 RX ORDER — AZITHROMYCIN 250 MG/1
250 TABLET, FILM COATED ORAL EVERY 24 HOURS
Status: DISCONTINUED | OUTPATIENT
Start: 2025-07-06 | End: 2025-07-05 | Stop reason: HOSPADM

## 2025-07-05 RX ORDER — AZITHROMYCIN 250 MG/1
250 TABLET, FILM COATED ORAL EVERY 24 HOURS
Status: DISCONTINUED | OUTPATIENT
Start: 2025-07-05 | End: 2025-07-05

## 2025-07-05 RX ORDER — CEFUROXIME AXETIL 250 MG/1
500 TABLET ORAL 2 TIMES DAILY
Status: DISCONTINUED | OUTPATIENT
Start: 2025-07-05 | End: 2025-07-05 | Stop reason: HOSPADM

## 2025-07-05 RX ORDER — CEFUROXIME AXETIL 500 MG/1
500 TABLET ORAL 2 TIMES DAILY
Qty: 14 TABLET | Refills: 0 | Status: SHIPPED | OUTPATIENT
Start: 2025-07-05 | End: 2025-07-12

## 2025-07-05 RX ADMIN — ACYCLOVIR 400 MG: 200 CAPSULE ORAL at 09:56

## 2025-07-05 RX ADMIN — ALLOPURINOL 300 MG: 300 TABLET ORAL at 09:56

## 2025-07-05 RX ADMIN — MUPIROCIN: 20 OINTMENT TOPICAL at 09:56

## 2025-07-05 RX ADMIN — PIPERACILLIN SODIUM AND TAZOBACTAM SODIUM 4.5 G: 4; .5 INJECTION, SOLUTION INTRAVENOUS at 05:14

## 2025-07-05 RX ADMIN — CEFUROXIME AXETIL 500 MG: 250 TABLET, FILM COATED ORAL at 10:57

## 2025-07-05 RX ADMIN — AZITHROMYCIN DIHYDRATE 500 MG: 250 TABLET ORAL at 10:57

## 2025-07-05 ASSESSMENT — COGNITIVE AND FUNCTIONAL STATUS - GENERAL
MOBILITY SCORE: 24
DAILY ACTIVITIY SCORE: 24

## 2025-07-05 ASSESSMENT — PAIN - FUNCTIONAL ASSESSMENT: PAIN_FUNCTIONAL_ASSESSMENT: 0-10

## 2025-07-05 ASSESSMENT — PAIN SCALES - GENERAL: PAINLEVEL_OUTOF10: 0 - NO PAIN

## 2025-07-05 NOTE — CARE PLAN
The patient's goals for the shift include      The clinical goals for the shift include Will remain free from falls.  Tolerate IV ABX    Alert and oriented.  Resting in bed.  Tolerating po intake well without c/o n/v.  HS medications given without difficulty.  Tolerating IV antibiotics without difficulty.  Voiding without difficulty.  Safety maintained.

## 2025-07-05 NOTE — PROGRESS NOTES
Vancomycin Dosing by Pharmacy- FOLLOW UP    Donavon Brush is a 84 y.o. year old male who Pharmacy has been consulted for vancomycin dosing for pneumonia. Based on the patient's indication and renal status this patient is being dosed based on a goal AUC of 400-600.     Renal function is currently stable.    Current vancomycin dose: 1000 mg given every 24 hours    Estimated vancomycin AUC on current dose: 466 mg/L.hr     Visit Vitals  /62 (BP Location: Right arm, Patient Position: Lying)   Pulse 67   Temp 36.4 °C (97.5 °F) (Temporal)   Resp 14        Lab Results   Component Value Date    CREATININE 1.44 (H) 2025    CREATININE 1.61 (H) 2025    CREATININE 1.62 (H) 2025    CREATININE 1.38 (H) 2025    CREATININE 1.48 (H) 2025        Patient weight is as follows:   Vitals:    25 0042   Weight: 82.5 kg (181 lb 14.1 oz)       Cultures:  No results found for the encounter in last 14 days.       I/O last 3 completed shifts:  In: 3740 (45.3 mL/kg) [P.O.:600; IV Piggyback:3140]  Out: - (0 mL/kg)   Weight: 82.5 kg   I/O during current shift:  I/O this shift:  In: 300 [P.O.:300]  Out: -     Temp (24hrs), Av.6 °C (97.8 °F), Min:36.4 °C (97.5 °F), Max:36.6 °C (97.9 °F)      Assessment/Plan    Within goal AUC range. Continue current vancomycin regimen.    This dosing regimen is predicted by [employer] to result in the following pharmacokinetic parameters:  Loading dose: N/A  Regimen: 1000 mg IV every 24 hours.  Start time: 22:30 on 2025  Exposure target: AUC24 (range) 400-600 mg/L.hr   ICA83-78: 416 mg/L.hr  AUC24,ss: 466 mg/L.hr  Probability of AUC24 > 400: 72 %  Ctrough,ss: 15.6 mg/L  Probability of Ctrough,ss > 20: 23 %    The next level will be obtained on 25 at 0500. May be obtained sooner if clinically indicated.   Will continue to monitor renal function daily while on vancomycin and order serum creatinine at least every 48 hours if not already ordered.  Follow for continued  vancomycin needs, clinical response, and signs/symptoms of toxicity.       Aracely Garza, PharmD

## 2025-07-05 NOTE — DISCHARGE SUMMARY
Discharge Diagnosis  Acute pneumonia       Issues Requiring Follow-Up  Follow up CT scan in a few weeks   Follow up with speech therapy     Discharge Meds     Medication List      START taking these medications     azithromycin 250 mg tablet; Commonly known as: Zithromax; 500 mg the   first day and 250 mg for four days   cefuroxime 500 mg tablet; Commonly known as: Ceftin; Take 1 tablet (500   mg) by mouth 2 times a day for 14 doses.     CONTINUE taking these medications     acyclovir 400 mg tablet; Commonly known as: Zovirax; Take 1 tablet (400   mg) by mouth 2 times a day.   allopurinol 300 mg tablet; Commonly known as: Zyloprim; Take 1 tablet   (300 mg) by mouth once daily.   carvedilol 25 mg tablet; Commonly known as: Coreg; TAKE ONE TABLET BY   MOUTH TWO TIMES A DAY, IN THE MORNING AND LATE AFTERNOON.   Entresto 24-26 mg tablet; Generic drug: sacubitriL-valsartan; Take 1   tablet by mouth 2 times a day.   erythromycin 5 mg/gram (0.5 %) ophthalmic ointment; Commonly known as:   Romycin; Apply to left eye every 6 hours. APPLY 1/2 INCH RIBBON IN THE   LEFT EYE UP TO THREE TIMES DAILY   Euthyrox 75 mcg tablet; Generic drug: levothyroxine; TAKE ONE TABLET BY   MOUTH ONCE DAILY AS DIRECTED.   neomycin-bacitracin-polymyxin-HC 3.5-400-10,000 mg-unit/g-1% ophthalmic   ointment; Commonly known as: Cortisporin; Apply to left eye twice a day   for 2 weeks   obinutuzumab (Gazyva) IV in 100 mL NS   ondansetron 8 mg tablet; Commonly known as: Zofran; Take 1 tablet (8 mg)   by mouth every 8 hours if needed for nausea or vomiting.   Venclexta 100 mg tablet; Generic drug: venetoclax; Take 2 tablets (200   mg total) by mouth once daily.  Take with food.   warfarin 7.5 mg tablet; Commonly known as: Coumadin; TAKE ONE TABLET BY   MOUTH ONCE DAILY AT BEDTIME       Test Results Pending At Discharge  Pending Labs       Order Current Status    Extra Urine Gray Tube In process    Urinalysis with Reflex Culture and Microscopic In process     Blood Culture Preliminary result    Blood Culture Preliminary result            Hospital Course  Donavon Brush is a 84 y.o. male presenting with fever and chills. Pt states that he was outside on his tractor yesterday mowing for about 6 hours. He states that when he came inside, he had the chills and could not get warm. He then shortly after felt very hot and came to the ED. Pt was found to be febrile in the ED. Pt denies any cp, sob, or cough. Imaging revealed pneumonia and pt was admitted to [facility] for further care. Upon examination, pt states he feels good and is anxious to go home. Patient states he is going home today. Eating without coughing      Acute pneumonia     Paroxysmal atrial fibrillation (Multi)     Chronic systolic heart failure     Pacemaker     HTN (hypertension)     Orbital mass        #Pneumonia   -Febrile, tachycardic in ED; borderline hypotensive   -CT chest: Dense consolidative opacity in the right upper lobe compatible with  pneumonia.  11 mm focal nodular opacity in the right upper lobe and also 6 mm  discrete pulmonary nodule in the left lower lobe. Several mildly  prominent mediastinal lymph nodes are also noted. The patient has  known history of lymphoma and progress imaging is recommended to  better assess patient's underlying disease.  -Flu/covid neg   -VB.46/41/42/29.2  -WBC 5.8>6.4  -Lactate 1.2  -Blood preliminary normal   -Given 2L of IVF in ED  -IV vanc, zosyn transition to Zithromax and Ceftin   -MRSA nares positive  -Stable on RA      #Atrial fibrillation  #Chronic systolic heart failure   #S/P PPM (battery change 3 wks ago)   #Essential HTN  -Echo : 1. The left ventricular systolic function is normal, with a visually estimated ejection fraction of 55%.   2. Spectral Doppler shows an abnormal pattern of left ventricular diastolic filling.   3. There is moderately increased posterior left ventricular wall thickness.   4. The left atrial size is severely dilated.   5.  The right atrium is moderately dilated.   6. Mild to moderate tricuspid regurgitation visualized.   7. Mild aortic valve regurgitation. 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 Mild to moderately elevated pulmonary artery pressure, estimated RVSP 50mmHg.   9. There is mild to moderate mitral valve stenosis.  -Trop 12 > 13   -Entresto on hold for hypotension  -Continue warfarin   -INR 2.7 today   -Daily INR while inpatient      #Orbital Lymphoma  -Continue venetoclax      DVT ppx  -warfarin     F: PRN  E: Replete per protocol  N: Regular   A: PIV    Pertinent Physical Exam At Time of Discharge  Physical Exam  Physical Exam  Vitals reviewed.   HENT:      Head: Normocephalic.      Nose: Nose normal.   Eyes:      Pupils: Pupils are equal, round, and reactive to light.   Cardiovascular:      Rate and Rhythm: Normal rate and regular rhythm.   Pulmonary:      Effort: Pulmonary effort is normal.      Breath sounds: Normal breath sounds.   Abdominal:      General: Abdomen is flat.      Palpations: Abdomen is soft.   Musculoskeletal:         General: Normal range of motion.      Cervical back: Normal range of motion.   Skin:     General: Skin is warm and dry.   Neurological:      General: No focal deficit present.      Mental Status: He is alert and oriented to person, place, and time.   Psychiatric:         Mood and Affect: Mood normal.       Outpatient Follow-Up  Future Appointments   Date Time Provider Department Center   7/15/2025  1:00 PM EDI [id number] CARDIAC DEVICE CLINIC [id number] [facility]   7/21/2025  8:30 AM INF 12 MINOFF [id number] [facility]   7/21/2025  9:45 AM GEN MOBILE ADMIN ROOM PET CT GENPETMOB [facility]   7/21/2025 10:45 AM GEN MOBILE PET CT GENPETMOB [facility]   7/23/2025  9:00 AM Kael Chris MD [id number] [facility]   7/23/2025  9:30 AM INF 01 GENEVA GENSCCINF [facility]   7/28/2025  9:00 AM INF 12 MINOFF [id number] [facility]   8/4/2025  9:00 AM INF 12 MINOFF [id number] [facility]   8/11/2025  8:00 AM INF 12 MINOFF  [id number] [facility]   9/3/2025  3:00 PM Ike Bradley MD [id number] [facility]   11/18/2025  2:40 PM ROGERIO pSears [id number] [facility]         JOCELYNN SpearsCNP

## 2025-07-05 NOTE — PROGRESS NOTES
Vancomycin Dosing by Pharmacy- Cessation of Therapy    Consult to pharmacy for vancomycin dosing has been discontinued by the prescriber, pharmacy will sign off at this time.    Please call pharmacy if there are further questions or re-enter a consult if vancomycin is resumed.     Aracely Garza, KrishnaD

## 2025-07-05 NOTE — CARE PLAN
The patient's goals for the shift include      The clinical goals for the shift include will not become SOB and spo2 will maintain 92% or greater    Goal met. Vss. Patient denies pain. Patient discharged to home. Discharge instructions reviewed with patient and spouse that includes; new medications, follow up appointments, and printed education on CHF and PNE. Started patient on first dose of new prescriptions of oral ATB's.

## 2025-07-06 LAB
BACTERIA BLD CULT: NORMAL
BACTERIA BLD CULT: NORMAL

## 2025-07-07 LAB — HOLD SPECIMEN: NORMAL

## 2025-07-08 ENCOUNTER — TELEPHONE (OUTPATIENT)
Dept: PRIMARY CARE | Facility: CLINIC | Age: 85
End: 2025-07-08
Payer: MEDICARE

## 2025-07-08 LAB
ATRIAL RATE: 62 BPM
BACTERIA BLD CULT: NORMAL
BACTERIA BLD CULT: NORMAL
Q ONSET: 195 MS
QRS COUNT: 12 BEATS
QRS DURATION: 178 MS
QT INTERVAL: 418 MS
QTC CALCULATION(BAZETT): 451 MS
QTC FREDERICIA: 440 MS
R AXIS: 220 DEGREES
T AXIS: 114 DEGREES
T OFFSET: 404 MS
VENTRICULAR RATE: 70 BPM

## 2025-07-08 NOTE — TELEPHONE ENCOUNTER
Transition of Care    Inpatient facility: [place]   Discharge diagnosis: Acute Pneumonia   Discharged to: home  Discharge date: 07/05/25  Initial Call date: 07/08/2025  Spoke with patient/caregiver: spouse                                                                      Do you need assistance  visits prior to your PCP visit: No  Home health care ordered: No  Have you been contacted by home care and have a start of care date: No  Are you taking medications as prescribed at discharge: Yes    Referral to [facility] Pharmacist: No  Patient advised to bring all medications to PCP follow-up appointment.  Patient advised to follow discharge instructions until provider follow-up.  TCM visit date: 07/09/2025  TCM provider visit with: Dr Payne

## 2025-07-09 ENCOUNTER — OFFICE VISIT (OUTPATIENT)
Dept: PRIMARY CARE | Facility: CLINIC | Age: 85
End: 2025-07-09
Payer: MEDICARE

## 2025-07-09 ENCOUNTER — SPECIALTY PHARMACY (OUTPATIENT)
Dept: PHARMACY | Facility: CLINIC | Age: 85
End: 2025-07-09

## 2025-07-09 VITALS
BODY MASS INDEX: 28.74 KG/M2 | SYSTOLIC BLOOD PRESSURE: 140 MMHG | TEMPERATURE: 96.8 F | WEIGHT: 183.5 LBS | OXYGEN SATURATION: 96 % | HEART RATE: 69 BPM | DIASTOLIC BLOOD PRESSURE: 72 MMHG

## 2025-07-09 DIAGNOSIS — I48.0 PAROXYSMAL ATRIAL FIBRILLATION (MULTI): ICD-10-CM

## 2025-07-09 DIAGNOSIS — C85.99 ORBITAL LYMPHOMA (MULTI): ICD-10-CM

## 2025-07-09 DIAGNOSIS — J18.9 PNEUMONIA OF RIGHT LUNG DUE TO INFECTIOUS ORGANISM, UNSPECIFIED PART OF LUNG: Primary | ICD-10-CM

## 2025-07-09 PROCEDURE — 1159F MED LIST DOCD IN RCRD: CPT | Performed by: FAMILY MEDICINE

## 2025-07-09 PROCEDURE — 1036F TOBACCO NON-USER: CPT | Performed by: FAMILY MEDICINE

## 2025-07-09 PROCEDURE — 1111F DSCHRG MED/CURRENT MED MERGE: CPT | Performed by: FAMILY MEDICINE

## 2025-07-09 PROCEDURE — 3077F SYST BP >= 140 MM HG: CPT | Performed by: FAMILY MEDICINE

## 2025-07-09 PROCEDURE — 99496 TRANSJ CARE MGMT HIGH F2F 7D: CPT | Performed by: FAMILY MEDICINE

## 2025-07-09 PROCEDURE — 3078F DIAST BP <80 MM HG: CPT | Performed by: FAMILY MEDICINE

## 2025-07-09 ASSESSMENT — ENCOUNTER SYMPTOMS
UNEXPECTED WEIGHT CHANGE: 0
NUMBNESS: 0
COUGH: 1
WHEEZING: 0
CONSTIPATION: 0
SLEEP DISTURBANCE: 0
DIZZINESS: 0
RHINORRHEA: 0
NERVOUS/ANXIOUS: 0
EYE ITCHING: 0
VOMITING: 0
EYE DISCHARGE: 0
PALPITATIONS: 0
JOINT SWELLING: 0
SORE THROAT: 0
DYSPHORIC MOOD: 0
HEMATURIA: 0
HEADACHES: 0
ACTIVITY CHANGE: 0
SHORTNESS OF BREATH: 1
APPETITE CHANGE: 0
NAUSEA: 0
SINUS PRESSURE: 0
MYALGIAS: 0
ABDOMINAL PAIN: 0
BLOOD IN STOOL: 0
FLANK PAIN: 0
DIARRHEA: 0
FEVER: 0
LIGHT-HEADEDNESS: 0
WEAKNESS: 0
DYSURIA: 0
ARTHRALGIAS: 0

## 2025-07-13 ENCOUNTER — APPOINTMENT (OUTPATIENT)
Dept: RADIOLOGY | Facility: HOSPITAL | Age: 85
DRG: 193 | End: 2025-07-13
Payer: MEDICARE

## 2025-07-13 ENCOUNTER — HOSPITAL ENCOUNTER (INPATIENT)
Facility: HOSPITAL | Age: 85
LOS: 2 days | Discharge: HOME | DRG: 193 | End: 2025-07-15
Attending: FAMILY MEDICINE | Admitting: INTERNAL MEDICINE
Payer: MEDICARE

## 2025-07-13 ENCOUNTER — APPOINTMENT (OUTPATIENT)
Dept: CARDIOLOGY | Facility: HOSPITAL | Age: 85
DRG: 193 | End: 2025-07-13
Payer: MEDICARE

## 2025-07-13 DIAGNOSIS — J18.1 CONSOLIDATION LUNG: ICD-10-CM

## 2025-07-13 DIAGNOSIS — A41.9 SEPSIS DUE TO PNEUMONIA (MULTI): ICD-10-CM

## 2025-07-13 DIAGNOSIS — J96.11 HYPOXEMIC RESPIRATORY FAILURE, CHRONIC: ICD-10-CM

## 2025-07-13 DIAGNOSIS — R33.9 RETENTION OF URINE: ICD-10-CM

## 2025-07-13 DIAGNOSIS — J96.01 ACUTE HYPOXIC RESPIRATORY FAILURE: Primary | ICD-10-CM

## 2025-07-13 DIAGNOSIS — J18.9 SEPSIS DUE TO PNEUMONIA (MULTI): ICD-10-CM

## 2025-07-13 DIAGNOSIS — J96.01 ACUTE HYPOXEMIC RESPIRATORY FAILURE: ICD-10-CM

## 2025-07-13 LAB
ALBUMIN SERPL BCP-MCNC: 3.8 G/DL (ref 3.4–5)
ALP SERPL-CCNC: 60 U/L (ref 33–136)
ALT SERPL W P-5'-P-CCNC: 13 U/L (ref 10–52)
ANION GAP BLDA CALCULATED.4IONS-SCNC: 11 MMO/L (ref 10–25)
ANION GAP SERPL CALC-SCNC: 15 MMOL/L (ref 10–20)
APPEARANCE UR: ABNORMAL
APTT PPP: 38 SECONDS (ref 26–36)
AST SERPL W P-5'-P-CCNC: 16 U/L (ref 9–39)
BASE EXCESS BLDA CALC-SCNC: 0.6 MMOL/L (ref -2–3)
BASOPHILS # BLD AUTO: 0.03 X10*3/UL (ref 0–0.1)
BASOPHILS NFR BLD AUTO: 0.9 %
BILIRUB SERPL-MCNC: 0.7 MG/DL (ref 0–1.2)
BILIRUB UR STRIP.AUTO-MCNC: NEGATIVE MG/DL
BNP SERPL-MCNC: 280 PG/ML (ref 0–99)
BODY TEMPERATURE: ABNORMAL
BUN SERPL-MCNC: 23 MG/DL (ref 6–23)
CA-I BLDA-SCNC: 1.25 MMOL/L (ref 1.1–1.33)
CALCIUM SERPL-MCNC: 9.4 MG/DL (ref 8.6–10.3)
CARDIAC TROPONIN I PNL SERPL HS: 12 NG/L (ref 0–20)
CARDIAC TROPONIN I PNL SERPL HS: 26 NG/L (ref 0–20)
CARDIAC TROPONIN I PNL SERPL HS: 39 NG/L (ref 0–20)
CHLORIDE BLDA-SCNC: 104 MMOL/L (ref 98–107)
CHLORIDE SERPL-SCNC: 103 MMOL/L (ref 98–107)
CO2 SERPL-SCNC: 24 MMOL/L (ref 21–32)
COLOR UR: ABNORMAL
CREAT SERPL-MCNC: 1.64 MG/DL (ref 0.5–1.3)
EGFRCR SERPLBLD CKD-EPI 2021: 41 ML/MIN/1.73M*2
EOSINOPHIL # BLD AUTO: 0 X10*3/UL (ref 0–0.4)
EOSINOPHIL NFR BLD AUTO: 0 %
ERYTHROCYTE [DISTWIDTH] IN BLOOD BY AUTOMATED COUNT: 13.8 % (ref 11.5–14.5)
FLUAV RNA RESP QL NAA+PROBE: NOT DETECTED
FLUBV RNA RESP QL NAA+PROBE: NOT DETECTED
GLUCOSE BLDA-MCNC: 179 MG/DL (ref 74–99)
GLUCOSE SERPL-MCNC: 162 MG/DL (ref 74–99)
GLUCOSE UR STRIP.AUTO-MCNC: NORMAL MG/DL
HCO3 BLDA-SCNC: 24.5 MMOL/L (ref 22–26)
HCT VFR BLD AUTO: 43.7 % (ref 41–52)
HCT VFR BLD EST: 41 % (ref 41–52)
HGB BLD-MCNC: 14.1 G/DL (ref 13.5–17.5)
HGB BLDA-MCNC: 13.6 G/DL (ref 13.5–17.5)
IMM GRANULOCYTES # BLD AUTO: 0.2 X10*3/UL (ref 0–0.5)
IMM GRANULOCYTES NFR BLD AUTO: 5.9 % (ref 0–0.9)
INHALED O2 CONCENTRATION: 100 %
INR PPP: 4.4 (ref 0.9–1.1)
KETONES UR STRIP.AUTO-MCNC: ABNORMAL MG/DL
LACTATE BLDA-SCNC: 1.6 MMOL/L (ref 0.4–2)
LACTATE SERPL-SCNC: 1.6 MMOL/L (ref 0.4–2)
LACTATE SERPL-SCNC: 2.9 MMOL/L (ref 0.4–2)
LEUKOCYTE ESTERASE UR QL STRIP.AUTO: NEGATIVE
LYMPHOCYTES # BLD AUTO: 1.62 X10*3/UL (ref 0.8–3)
LYMPHOCYTES NFR BLD AUTO: 47.6 %
MCH RBC QN AUTO: 30.5 PG (ref 26–34)
MCHC RBC AUTO-ENTMCNC: 32.3 G/DL (ref 32–36)
MCV RBC AUTO: 95 FL (ref 80–100)
MONOCYTES # BLD AUTO: 0.46 X10*3/UL (ref 0.05–0.8)
MONOCYTES NFR BLD AUTO: 13.5 %
MRSA DNA SPEC QL NAA+PROBE: DETECTED
MUCOUS THREADS #/AREA URNS AUTO: ABNORMAL /LPF
NEUTROPHILS # BLD AUTO: 1.09 X10*3/UL (ref 1.6–5.5)
NEUTROPHILS NFR BLD AUTO: 32.1 %
NITRITE UR QL STRIP.AUTO: NEGATIVE
NRBC BLD-RTO: 0 /100 WBCS (ref 0–0)
OVALOCYTES BLD QL SMEAR: NORMAL
OXYHGB MFR BLDA: 96.9 % (ref 94–98)
PCO2 BLDA: 36 MM HG (ref 38–42)
PH BLDA: 7.44 PH (ref 7.38–7.42)
PH UR STRIP.AUTO: 5.5 [PH]
PLATELET # BLD AUTO: 247 X10*3/UL (ref 150–450)
PO2 BLDA: 137 MM HG (ref 85–95)
POTASSIUM BLDA-SCNC: 5.3 MMOL/L (ref 3.5–5.3)
POTASSIUM SERPL-SCNC: 4.9 MMOL/L (ref 3.5–5.3)
PROT SERPL-MCNC: 6.5 G/DL (ref 6.4–8.2)
PROT UR STRIP.AUTO-MCNC: ABNORMAL MG/DL
PROTHROMBIN TIME: 48.8 SECONDS (ref 9.8–12.4)
RBC # BLD AUTO: 4.62 X10*6/UL (ref 4.5–5.9)
RBC # UR STRIP.AUTO: ABNORMAL MG/DL
RBC #/AREA URNS AUTO: >20 /HPF
RBC MORPH BLD: NORMAL
ROULEAUX BLD QL SMEAR: PRESENT
SAO2 % BLDA: 99 % (ref 94–100)
SARS-COV-2 RNA RESP QL NAA+PROBE: NOT DETECTED
SODIUM BLDA-SCNC: 134 MMOL/L (ref 136–145)
SODIUM SERPL-SCNC: 137 MMOL/L (ref 136–145)
SP GR UR STRIP.AUTO: 1.02
UROBILINOGEN UR STRIP.AUTO-MCNC: NORMAL MG/DL
WBC # BLD AUTO: 3.4 X10*3/UL (ref 4.4–11.3)
WBC #/AREA URNS AUTO: ABNORMAL /HPF

## 2025-07-13 PROCEDURE — 85730 THROMBOPLASTIN TIME PARTIAL: CPT | Performed by: FAMILY MEDICINE

## 2025-07-13 PROCEDURE — 2500000004 HC RX 250 GENERAL PHARMACY W/ HCPCS (ALT 636 FOR OP/ED): Mod: JZ,TB,IPSPLIT | Performed by: INTERNAL MEDICINE

## 2025-07-13 PROCEDURE — 94640 AIRWAY INHALATION TREATMENT: CPT

## 2025-07-13 PROCEDURE — 94760 N-INVAS EAR/PLS OXIMETRY 1: CPT | Mod: CCI

## 2025-07-13 PROCEDURE — 2500000004 HC RX 250 GENERAL PHARMACY W/ HCPCS (ALT 636 FOR OP/ED): Performed by: FAMILY MEDICINE

## 2025-07-13 PROCEDURE — 85610 PROTHROMBIN TIME: CPT | Performed by: FAMILY MEDICINE

## 2025-07-13 PROCEDURE — 99285 EMERGENCY DEPT VISIT HI MDM: CPT | Mod: 25

## 2025-07-13 PROCEDURE — 94660 CPAP INITIATION&MGMT: CPT

## 2025-07-13 PROCEDURE — 81001 URINALYSIS AUTO W/SCOPE: CPT | Performed by: FAMILY MEDICINE

## 2025-07-13 PROCEDURE — 71045 X-RAY EXAM CHEST 1 VIEW: CPT | Performed by: RADIOLOGY

## 2025-07-13 PROCEDURE — 2500000002 HC RX 250 W HCPCS SELF ADMINISTERED DRUGS (ALT 637 FOR MEDICARE OP, ALT 636 FOR OP/ED): Performed by: FAMILY MEDICINE

## 2025-07-13 PROCEDURE — 87086 URINE CULTURE/COLONY COUNT: CPT | Mod: GENLAB | Performed by: FAMILY MEDICINE

## 2025-07-13 PROCEDURE — 87449 NOS EACH ORGANISM AG IA: CPT | Mod: GENLAB | Performed by: INTERNAL MEDICINE

## 2025-07-13 PROCEDURE — 2500000005 HC RX 250 GENERAL PHARMACY W/O HCPCS: Mod: IPSPLIT | Performed by: INTERNAL MEDICINE

## 2025-07-13 PROCEDURE — 85025 COMPLETE CBC W/AUTO DIFF WBC: CPT | Performed by: FAMILY MEDICINE

## 2025-07-13 PROCEDURE — 84484 ASSAY OF TROPONIN QUANT: CPT | Mod: IPSPLIT | Performed by: INTERNAL MEDICINE

## 2025-07-13 PROCEDURE — 84132 ASSAY OF SERUM POTASSIUM: CPT | Mod: 59 | Performed by: FAMILY MEDICINE

## 2025-07-13 PROCEDURE — 83880 ASSAY OF NATRIURETIC PEPTIDE: CPT | Performed by: FAMILY MEDICINE

## 2025-07-13 PROCEDURE — 2500000005 HC RX 250 GENERAL PHARMACY W/O HCPCS: Mod: IPSPLIT | Performed by: FAMILY MEDICINE

## 2025-07-13 PROCEDURE — 96365 THER/PROPH/DIAG IV INF INIT: CPT

## 2025-07-13 PROCEDURE — 84145 PROCALCITONIN (PCT): CPT | Mod: GENLAB | Performed by: INTERNAL MEDICINE

## 2025-07-13 PROCEDURE — 96375 TX/PRO/DX INJ NEW DRUG ADDON: CPT

## 2025-07-13 PROCEDURE — 94799 UNLISTED PULMONARY SVC/PX: CPT

## 2025-07-13 PROCEDURE — 5A09357 ASSISTANCE WITH RESPIRATORY VENTILATION, LESS THAN 24 CONSECUTIVE HOURS, CONTINUOUS POSITIVE AIRWAY PRESSURE: ICD-10-PCS | Performed by: INTERNAL MEDICINE

## 2025-07-13 PROCEDURE — 93005 ELECTROCARDIOGRAM TRACING: CPT

## 2025-07-13 PROCEDURE — 36415 COLL VENOUS BLD VENIPUNCTURE: CPT | Performed by: FAMILY MEDICINE

## 2025-07-13 PROCEDURE — 87075 CULTR BACTERIA EXCEPT BLOOD: CPT | Mod: GENLAB | Performed by: FAMILY MEDICINE

## 2025-07-13 PROCEDURE — 36600 WITHDRAWAL OF ARTERIAL BLOOD: CPT

## 2025-07-13 PROCEDURE — 84132 ASSAY OF SERUM POTASSIUM: CPT | Performed by: FAMILY MEDICINE

## 2025-07-13 PROCEDURE — 71045 X-RAY EXAM CHEST 1 VIEW: CPT

## 2025-07-13 PROCEDURE — 9420000001 HC RT PATIENT EDUCATION 5 MIN

## 2025-07-13 PROCEDURE — 84484 ASSAY OF TROPONIN QUANT: CPT | Performed by: FAMILY MEDICINE

## 2025-07-13 PROCEDURE — 2020000001 HC ICU ROOM DAILY: Mod: IPSPLIT

## 2025-07-13 PROCEDURE — 99285 EMERGENCY DEPT VISIT HI MDM: CPT | Mod: 25 | Performed by: FAMILY MEDICINE

## 2025-07-13 PROCEDURE — 83605 ASSAY OF LACTIC ACID: CPT | Performed by: FAMILY MEDICINE

## 2025-07-13 PROCEDURE — 94760 N-INVAS EAR/PLS OXIMETRY 1: CPT | Mod: IPSPLIT

## 2025-07-13 PROCEDURE — 87899 AGENT NOS ASSAY W/OPTIC: CPT | Mod: GENLAB | Performed by: INTERNAL MEDICINE

## 2025-07-13 PROCEDURE — 87636 SARSCOV2 & INF A&B AMP PRB: CPT | Performed by: FAMILY MEDICINE

## 2025-07-13 PROCEDURE — 94664 DEMO&/EVAL PT USE INHALER: CPT | Mod: 59

## 2025-07-13 PROCEDURE — 87641 MR-STAPH DNA AMP PROBE: CPT | Performed by: FAMILY MEDICINE

## 2025-07-13 PROCEDURE — 2500000004 HC RX 250 GENERAL PHARMACY W/ HCPCS (ALT 636 FOR OP/ED)

## 2025-07-13 RX ORDER — ALLOPURINOL 300 MG/1
300 TABLET ORAL DAILY
Status: DISCONTINUED | OUTPATIENT
Start: 2025-07-14 | End: 2025-07-14

## 2025-07-13 RX ORDER — VANCOMYCIN HYDROCHLORIDE 1 G/20ML
INJECTION, POWDER, LYOPHILIZED, FOR SOLUTION INTRAVENOUS DAILY PRN
Status: DISCONTINUED | OUTPATIENT
Start: 2025-07-13 | End: 2025-07-15 | Stop reason: HOSPADM

## 2025-07-13 RX ORDER — CARVEDILOL 25 MG/1
25 TABLET ORAL 2 TIMES DAILY
Status: DISCONTINUED | OUTPATIENT
Start: 2025-07-14 | End: 2025-07-15 | Stop reason: HOSPADM

## 2025-07-13 RX ORDER — IPRATROPIUM BROMIDE AND ALBUTEROL SULFATE 2.5; .5 MG/3ML; MG/3ML
9 SOLUTION RESPIRATORY (INHALATION) ONCE
Status: COMPLETED | OUTPATIENT
Start: 2025-07-13 | End: 2025-07-13

## 2025-07-13 RX ORDER — ACYCLOVIR 400 MG/1
400 TABLET ORAL 2 TIMES DAILY
Status: DISCONTINUED | OUTPATIENT
Start: 2025-07-13 | End: 2025-07-14 | Stop reason: CLARIF

## 2025-07-13 RX ORDER — VANCOMYCIN HYDROCHLORIDE 1 G/200ML
2 INJECTION, SOLUTION INTRAVENOUS
Status: DISPENSED | OUTPATIENT
Start: 2025-07-13 | End: 2025-07-13

## 2025-07-13 RX ORDER — AZITHROMYCIN MONOHYDRATE 500 MG/5ML
INJECTION, POWDER, LYOPHILIZED, FOR SOLUTION INTRAVENOUS
Status: COMPLETED
Start: 2025-07-13 | End: 2025-07-13

## 2025-07-13 RX ORDER — LEVOTHYROXINE SODIUM 75 UG/1
75 TABLET ORAL DAILY
Status: DISCONTINUED | OUTPATIENT
Start: 2025-07-14 | End: 2025-07-15 | Stop reason: HOSPADM

## 2025-07-13 RX ORDER — IPRATROPIUM BROMIDE AND ALBUTEROL SULFATE 2.5; .5 MG/3ML; MG/3ML
SOLUTION RESPIRATORY (INHALATION)
Status: COMPLETED
Start: 2025-07-13 | End: 2025-07-13

## 2025-07-13 RX ADMIN — IPRATROPIUM BROMIDE AND ALBUTEROL SULFATE 9 ML: 2.5; .5 SOLUTION RESPIRATORY (INHALATION) at 18:27

## 2025-07-13 RX ADMIN — Medication 3 L/MIN: at 22:53

## 2025-07-13 RX ADMIN — Medication 4 L/MIN: at 22:00

## 2025-07-13 RX ADMIN — PIPERACILLIN SODIUM AND TAZOBACTAM SODIUM 3.38 G: 3; .375 INJECTION, SOLUTION INTRAVENOUS at 23:07

## 2025-07-13 RX ADMIN — VANCOMYCIN HYDROCHLORIDE 2 G: 1 INJECTION, SOLUTION INTRAVENOUS at 18:50

## 2025-07-13 RX ADMIN — IPRATROPIUM BROMIDE AND ALBUTEROL SULFATE 9 ML: .5; 3 SOLUTION RESPIRATORY (INHALATION) at 18:27

## 2025-07-13 RX ADMIN — SODIUM CHLORIDE 2500 ML: 9 INJECTION, SOLUTION INTRAVENOUS at 18:31

## 2025-07-13 RX ADMIN — PIPERACILLIN SODIUM AND TAZOBACTAM SODIUM 4.5 G: 4; .5 INJECTION, SOLUTION INTRAVENOUS at 18:37

## 2025-07-13 RX ADMIN — AZITHROMYCIN 500 MG: 500 INJECTION, POWDER, LYOPHILIZED, FOR SOLUTION INTRAVENOUS at 20:18

## 2025-07-13 RX ADMIN — Medication 40 PERCENT: at 18:45

## 2025-07-13 RX ADMIN — METHYLPREDNISOLONE SODIUM SUCCINATE 40 MG: 40 INJECTION, POWDER, FOR SOLUTION INTRAMUSCULAR; INTRAVENOUS at 22:57

## 2025-07-13 SDOH — ECONOMIC STABILITY: FOOD INSECURITY: WITHIN THE PAST 12 MONTHS, YOU WORRIED THAT YOUR FOOD WOULD RUN OUT BEFORE YOU GOT THE MONEY TO BUY MORE.: NEVER TRUE

## 2025-07-13 SDOH — ECONOMIC STABILITY: FOOD INSECURITY: WITHIN THE PAST 12 MONTHS, THE FOOD YOU BOUGHT JUST DIDN'T LAST AND YOU DIDN'T HAVE MONEY TO GET MORE.: NEVER TRUE

## 2025-07-13 SDOH — SOCIAL STABILITY: SOCIAL INSECURITY: WITHIN THE LAST YEAR, HAVE YOU BEEN HUMILIATED OR EMOTIONALLY ABUSED IN OTHER WAYS BY YOUR PARTNER OR EX-PARTNER?: NO

## 2025-07-13 SDOH — SOCIAL STABILITY: SOCIAL INSECURITY: DOES ANYONE TRY TO KEEP YOU FROM HAVING/CONTACTING OTHER FRIENDS OR DOING THINGS OUTSIDE YOUR HOME?: NO

## 2025-07-13 SDOH — SOCIAL STABILITY: SOCIAL INSECURITY: WERE YOU ABLE TO COMPLETE ALL THE BEHAVIORAL HEALTH SCREENINGS?: YES

## 2025-07-13 SDOH — ECONOMIC STABILITY: INCOME INSECURITY: IN THE PAST 12 MONTHS HAS THE ELECTRIC, GAS, OIL, OR WATER COMPANY THREATENED TO SHUT OFF SERVICES IN YOUR HOME?: NO

## 2025-07-13 SDOH — SOCIAL STABILITY: SOCIAL INSECURITY: WITHIN THE LAST YEAR, HAVE YOU BEEN AFRAID OF YOUR PARTNER OR EX-PARTNER?: NO

## 2025-07-13 SDOH — SOCIAL STABILITY: SOCIAL INSECURITY: HAS ANYONE EVER THREATENED TO HURT YOUR FAMILY OR YOUR PETS?: NO

## 2025-07-13 SDOH — SOCIAL STABILITY: SOCIAL INSECURITY: DO YOU FEEL UNSAFE GOING BACK TO THE PLACE WHERE YOU ARE LIVING?: NO

## 2025-07-13 SDOH — SOCIAL STABILITY: SOCIAL INSECURITY: ARE YOU OR HAVE YOU BEEN THREATENED OR ABUSED PHYSICALLY, EMOTIONALLY, OR SEXUALLY BY ANYONE?: NO

## 2025-07-13 SDOH — SOCIAL STABILITY: SOCIAL INSECURITY: DO YOU FEEL ANYONE HAS EXPLOITED OR TAKEN ADVANTAGE OF YOU FINANCIALLY OR OF YOUR PERSONAL PROPERTY?: NO

## 2025-07-13 SDOH — SOCIAL STABILITY: SOCIAL INSECURITY: ABUSE: ADULT

## 2025-07-13 SDOH — SOCIAL STABILITY: SOCIAL INSECURITY: HAVE YOU HAD THOUGHTS OF HARMING ANYONE ELSE?: NO

## 2025-07-13 SDOH — SOCIAL STABILITY: SOCIAL INSECURITY: ARE THERE ANY APPARENT SIGNS OF INJURIES/BEHAVIORS THAT COULD BE RELATED TO ABUSE/NEGLECT?: NO

## 2025-07-13 ASSESSMENT — LIFESTYLE VARIABLES
HOW OFTEN DURING THE LAST YEAR HAVE YOU NEEDED AN ALCOHOLIC DRINK FIRST THING IN THE MORNING TO GET YOURSELF GOING AFTER A NIGHT OF HEAVY DRINKING: NEVER
HAS A RELATIVE, FRIEND, DOCTOR, OR ANOTHER HEALTH PROFESSIONAL EXPRESSED CONCERN ABOUT YOUR DRINKING OR SUGGESTED YOU CUT DOWN: NO
HOW OFTEN DO YOU HAVE 6 OR MORE DRINKS ON ONE OCCASION: NEVER
HOW OFTEN DO YOU HAVE A DRINK CONTAINING ALCOHOL: 4 OR MORE TIMES A WEEK
HOW OFTEN DURING THE LAST YEAR HAVE YOU HAD A FEELING OF GUILT OR REMORSE AFTER DRINKING: NEVER
AUDIT-C TOTAL SCORE: 4
AUDIT TOTAL SCORE: 0
SKIP TO QUESTIONS 9-10: 1
HOW OFTEN DURING THE LAST YEAR HAVE YOU BEEN UNABLE TO REMEMBER WHAT HAPPENED THE NIGHT BEFORE BECAUSE YOU HAD BEEN DRINKING: NEVER
HOW MANY STANDARD DRINKS CONTAINING ALCOHOL DO YOU HAVE ON A TYPICAL DAY: 1 OR 2
AUDIT-C TOTAL SCORE: 4
AUDIT TOTAL SCORE: 4
HOW OFTEN DURING THE LAST YEAR HAVE YOU FOUND THAT YOU WERE NOT ABLE TO STOP DRINKING ONCE YOU HAD STARTED: NEVER
HAVE YOU OR SOMEONE ELSE BEEN INJURED AS A RESULT OF YOUR DRINKING: NO
HOW OFTEN DURING THE LAST YEAR HAVE YOU FAILED TO DO WHAT WAS NORMALLY EXPECTED FROM YOU BECAUSE OF DRINKING: NEVER

## 2025-07-13 ASSESSMENT — PAIN - FUNCTIONAL ASSESSMENT
PAIN_FUNCTIONAL_ASSESSMENT: 0-10
PAIN_FUNCTIONAL_ASSESSMENT: 0-10

## 2025-07-13 ASSESSMENT — PAIN SCALES - GENERAL
PAINLEVEL_OUTOF10: 0 - NO PAIN

## 2025-07-13 ASSESSMENT — ACTIVITIES OF DAILY LIVING (ADL)
TOILETING: INDEPENDENT
LACK_OF_TRANSPORTATION: NO
PATIENT'S MEMORY ADEQUATE TO SAFELY COMPLETE DAILY ACTIVITIES?: YES
GROOMING: INDEPENDENT
HEARING - RIGHT EAR: HEARING AID
BATHING: INDEPENDENT
HEARING - LEFT EAR: HEARING AID
JUDGMENT_ADEQUATE_SAFELY_COMPLETE_DAILY_ACTIVITIES: YES
DRESSING YOURSELF: INDEPENDENT
ADEQUATE_TO_COMPLETE_ADL: YES
WALKS IN HOME: INDEPENDENT
FEEDING YOURSELF: INDEPENDENT

## 2025-07-13 ASSESSMENT — COGNITIVE AND FUNCTIONAL STATUS - GENERAL
DAILY ACTIVITIY SCORE: 24
MOBILITY SCORE: 21
CLIMB 3 TO 5 STEPS WITH RAILING: A LITTLE
WALKING IN HOSPITAL ROOM: A LITTLE
STANDING UP FROM CHAIR USING ARMS: A LITTLE
PATIENT BASELINE BEDBOUND: NO

## 2025-07-13 NOTE — ED TRIAGE NOTES
Pt ambulatory to ED c/o SOB that happened when he was eating dinner tonight, pt is 84% on RA can only say 1-2 words at a time audibly wheezing, EKG performed, pt placed on 6L NC and is now 97%, RT called to bedside

## 2025-07-13 NOTE — ED PROVIDER NOTES
Emergency Department Provider Note       History of Present Illness     History provided by: Patient  Limitations to History: None  External Records Reviewed with Brief Summary: None    HPI:  Donavon Brush is a 84 y.o. male patient with history small lymphocytic leukemia, complete heart block, paroxysmal atrial fibs has pacemaker in place on Coumadin was recently diagnosed with pneumonia r on 7/3/2025, discharged return to the ER with shivering chills and fever short of breath cough and wheezing..  Denies any chest pain abdominal pain vomiting or diarrhea.  Admitted with cough and shortness of breath and fever and chills.  Denies UTI symptoms.  Denies any headache or neck stiffness.  Denies any trauma fall injury.    Physical Exam   Triage vitals:  T 37.8 °C (100 °F)  HR 73  BP (!) 161/94  RR (!) 30  O2 (!) 84 % None (Room air)    General: Awake, alert, chronically sick looking male elderly patient who was not feeling well shivering very when talking with shivering and shaking.  Noted a wet cough and coarse rhonchi.  No chest wall retraction no nasal flaring no hypoxemia.  Wet lung sounds.      Eyes: Gaze conjugate.  No scleral icterus or injection  HENT: Normo-cephalic, atraumatic. No stridor  CV: Regular rate, regular rhythm. Radial pulses 2+ bilaterally intact DP PT pulses no peripheral edema calf is nontender Homans' sign negative no palpable venous cord.  Resp: Breathing with tachypnea left lung sounds cough shivering and chills.  No hypoxemia peripheral edema calf is nontender Homans' sign negative good cap refill intact distal pulses   GI: Soft, non-distended, non-tender. No rebound or guarding.  : Deferred  MSK/Extremities: No gross bony deformities. Moving all extremities  Skin: Warm. Appropriate color  Neuro: Alert. Oriented. Face symmetric. Speech is fluent.  Gross strength and sensation intact in b/l UE and LEs  Psych: Appropriate mood and affect      Medical Decision Making & ED Course   Medical  "Decision Makin y.o. male with history of small cell lymphoma, aortic stenosis, A-fib pacemaker in place, recent pneumonia reported with the chills shivering and triggered sepsis due to fever and chills.    Admitted with cough and shortness of breath and chills using antibiotic after recent discharge without any chronic continued cough and symptoms got worse f and continued cough and symptom got worse she will bring chills body ache and fever.     Upon examination with endocrine fellow was midsternal mattress suture from prior surgery.  She brings a blood coarse rhonchi breath sounds and cough.  No tachypnea chest retraction heart with regular rate and rhythm vascular abdomen soft nontender palpable pacemaker noted in left anterior chest wall.  Abdomen soft and nontender With nontender emotions intact.      {Scoring Tools Utilized:41053}    Differential diagnoses considered include but are not limited to: ***    Social Determinants of Health which Significantly Impact Care: {Social Determinants of Health which Significantly Impact Care:15923} {The following actions were taken to address these social determinants (Optional):14835}    EKG Independent Interpretation: {EKG Independent Interpretation:49468}    Independent Result Review and Interpretation: {Independent Result Review and Interpretation:79757::\"Relevant laboratory and radiographic results were reviewed and independently interpreted by myself.  As necessary, they are commented on in the ED Course.\"}    Chronic conditions affecting the patient's care: {Chronic conditions affecting the patient's care:57928::\"As documented above in MDM\"}    The patient was discussed with the following consultants/services: {The patient was discussed with the following consultants/services:27650}    Care Considerations: {Care Considerations:87707::\"As documented above in MDM\"}    ED Course:   Patient initial EKG done at 1758 hrs. showed a lot of artifact with rate of 73.  " ST-T wave abnormality involving anterior lateral leads, no ST-T evaluation.  No STEMI.  Abnormal EKG.  Underlying pacer that cannot be excluded.  Abnormal EKG.  I read this EKG.          Disposition   {ED Disposition:11073}    Procedures   Procedures    {ED Provider Level (Optional):19057}    Eugene Bradford MD  Emergency Medicine

## 2025-07-14 PROBLEM — R33.9 RETENTION OF URINE: Status: ACTIVE | Noted: 2025-07-14

## 2025-07-14 LAB
ALBUMIN SERPL BCP-MCNC: 3.2 G/DL (ref 3.4–5)
ALP SERPL-CCNC: 46 U/L (ref 33–136)
ALT SERPL W P-5'-P-CCNC: 25 U/L (ref 10–52)
ANION GAP SERPL CALC-SCNC: 12 MMOL/L (ref 10–20)
AST SERPL W P-5'-P-CCNC: 26 U/L (ref 9–39)
BILIRUB SERPL-MCNC: 0.8 MG/DL (ref 0–1.2)
BUN SERPL-MCNC: 26 MG/DL (ref 6–23)
CALCIUM SERPL-MCNC: 8.5 MG/DL (ref 8.6–10.3)
CHLORIDE SERPL-SCNC: 107 MMOL/L (ref 98–107)
CO2 SERPL-SCNC: 24 MMOL/L (ref 21–32)
CREAT SERPL-MCNC: 1.65 MG/DL (ref 0.5–1.3)
EGFRCR SERPLBLD CKD-EPI 2021: 41 ML/MIN/1.73M*2
ERYTHROCYTE [DISTWIDTH] IN BLOOD BY AUTOMATED COUNT: 13.7 % (ref 11.5–14.5)
GLUCOSE SERPL-MCNC: 173 MG/DL (ref 74–99)
HCT VFR BLD AUTO: 34.5 % (ref 41–52)
HGB BLD-MCNC: 11 G/DL (ref 13.5–17.5)
HOLD SPECIMEN: NORMAL
HOLD SPECIMEN: NORMAL
INR PPP: 4.5 (ref 0.9–1.1)
LEGIONELLA AG UR QL: NEGATIVE
MCH RBC QN AUTO: 29.8 PG (ref 26–34)
MCHC RBC AUTO-ENTMCNC: 31.9 G/DL (ref 32–36)
MCV RBC AUTO: 94 FL (ref 80–100)
NRBC BLD-RTO: 0 /100 WBCS (ref 0–0)
PLATELET # BLD AUTO: 143 X10*3/UL (ref 150–450)
POTASSIUM SERPL-SCNC: 4.3 MMOL/L (ref 3.5–5.3)
PROCALCITONIN SERPL-MCNC: 2.66 NG/ML
PROT SERPL-MCNC: 5.4 G/DL (ref 6.4–8.2)
PROTHROMBIN TIME: 49.6 SECONDS (ref 9.8–12.4)
Q ONSET: 222 MS
QRS COUNT: 12 BEATS
QRS DURATION: 84 MS
QT INTERVAL: 348 MS
QTC CALCULATION(BAZETT): 383 MS
QTC FREDERICIA: 371 MS
R AXIS: -7 DEGREES
RBC # BLD AUTO: 3.69 X10*6/UL (ref 4.5–5.9)
S PNEUM AG UR QL: NEGATIVE
SODIUM SERPL-SCNC: 139 MMOL/L (ref 136–145)
T AXIS: 105 DEGREES
T OFFSET: 396 MS
VENTRICULAR RATE: 73 BPM
WBC # BLD AUTO: 2.6 X10*3/UL (ref 4.4–11.3)

## 2025-07-14 PROCEDURE — 94664 DEMO&/EVAL PT USE INHALER: CPT | Mod: IPSPLIT

## 2025-07-14 PROCEDURE — 2500000004 HC RX 250 GENERAL PHARMACY W/ HCPCS (ALT 636 FOR OP/ED): Mod: IPSPLIT | Performed by: INTERNAL MEDICINE

## 2025-07-14 PROCEDURE — 36415 COLL VENOUS BLD VENIPUNCTURE: CPT | Mod: IPSPLIT | Performed by: NURSE PRACTITIONER

## 2025-07-14 PROCEDURE — 2500000005 HC RX 250 GENERAL PHARMACY W/O HCPCS: Mod: IPSPLIT | Performed by: NURSE PRACTITIONER

## 2025-07-14 PROCEDURE — 36415 COLL VENOUS BLD VENIPUNCTURE: CPT | Mod: IPSPLIT | Performed by: INTERNAL MEDICINE

## 2025-07-14 PROCEDURE — 94660 CPAP INITIATION&MGMT: CPT | Mod: IPSPLIT

## 2025-07-14 PROCEDURE — 99223 1ST HOSP IP/OBS HIGH 75: CPT | Performed by: NURSE PRACTITIONER

## 2025-07-14 PROCEDURE — 2500000004 HC RX 250 GENERAL PHARMACY W/ HCPCS (ALT 636 FOR OP/ED): Mod: JZ,TB,IPSPLIT | Performed by: NURSE PRACTITIONER

## 2025-07-14 PROCEDURE — 85027 COMPLETE CBC AUTOMATED: CPT | Mod: IPSPLIT | Performed by: INTERNAL MEDICINE

## 2025-07-14 PROCEDURE — 94640 AIRWAY INHALATION TREATMENT: CPT | Mod: IPSPLIT

## 2025-07-14 PROCEDURE — 2500000004 HC RX 250 GENERAL PHARMACY W/ HCPCS (ALT 636 FOR OP/ED): Mod: IPSPLIT | Performed by: NURSE PRACTITIONER

## 2025-07-14 PROCEDURE — 2500000002 HC RX 250 W HCPCS SELF ADMINISTERED DRUGS (ALT 637 FOR MEDICARE OP, ALT 636 FOR OP/ED): Mod: IPSPLIT | Performed by: NURSE PRACTITIONER

## 2025-07-14 PROCEDURE — 2500000002 HC RX 250 W HCPCS SELF ADMINISTERED DRUGS (ALT 637 FOR MEDICARE OP, ALT 636 FOR OP/ED): Mod: IPSPLIT | Performed by: INTERNAL MEDICINE

## 2025-07-14 PROCEDURE — 2500000001 HC RX 250 WO HCPCS SELF ADMINISTERED DRUGS (ALT 637 FOR MEDICARE OP): Mod: IPSPLIT | Performed by: NURSE PRACTITIONER

## 2025-07-14 PROCEDURE — 2500000004 HC RX 250 GENERAL PHARMACY W/ HCPCS (ALT 636 FOR OP/ED): Mod: JZ,TB,IPSPLIT | Performed by: INTERNAL MEDICINE

## 2025-07-14 PROCEDURE — 2500000001 HC RX 250 WO HCPCS SELF ADMINISTERED DRUGS (ALT 637 FOR MEDICARE OP): Mod: IPSPLIT | Performed by: INTERNAL MEDICINE

## 2025-07-14 PROCEDURE — 2500000005 HC RX 250 GENERAL PHARMACY W/O HCPCS: Mod: IPSPLIT | Performed by: INTERNAL MEDICINE

## 2025-07-14 PROCEDURE — 94760 N-INVAS EAR/PLS OXIMETRY 1: CPT | Mod: IPSPLIT

## 2025-07-14 PROCEDURE — 84075 ASSAY ALKALINE PHOSPHATASE: CPT | Mod: IPSPLIT | Performed by: INTERNAL MEDICINE

## 2025-07-14 PROCEDURE — 85610 PROTHROMBIN TIME: CPT | Mod: IPSPLIT | Performed by: NURSE PRACTITIONER

## 2025-07-14 PROCEDURE — 1200000002 HC GENERAL ROOM WITH TELEMETRY DAILY: Mod: IPSPLIT

## 2025-07-14 RX ORDER — SODIUM CHLORIDE 9 MG/ML
10 INJECTION, SOLUTION INTRAVENOUS CONTINUOUS PRN
Status: DISCONTINUED | OUTPATIENT
Start: 2025-07-14 | End: 2025-07-15 | Stop reason: HOSPADM

## 2025-07-14 RX ORDER — IPRATROPIUM BROMIDE AND ALBUTEROL SULFATE 2.5; .5 MG/3ML; MG/3ML
3 SOLUTION RESPIRATORY (INHALATION) 3 TIMES DAILY
Status: DISCONTINUED | OUTPATIENT
Start: 2025-07-14 | End: 2025-07-15 | Stop reason: HOSPADM

## 2025-07-14 RX ORDER — ALBUTEROL SULFATE 0.83 MG/ML
2.5 SOLUTION RESPIRATORY (INHALATION) EVERY 2 HOUR PRN
Status: DISCONTINUED | OUTPATIENT
Start: 2025-07-14 | End: 2025-07-15 | Stop reason: HOSPADM

## 2025-07-14 RX ORDER — ACYCLOVIR 200 MG/1
400 CAPSULE ORAL 2 TIMES DAILY
Status: DISCONTINUED | OUTPATIENT
Start: 2025-07-14 | End: 2025-07-15 | Stop reason: HOSPADM

## 2025-07-14 RX ORDER — MUPIROCIN 20 MG/G
OINTMENT TOPICAL 3 TIMES DAILY
Status: DISCONTINUED | OUTPATIENT
Start: 2025-07-14 | End: 2025-07-15 | Stop reason: HOSPADM

## 2025-07-14 RX ORDER — VANCOMYCIN HYDROCHLORIDE 1 G/200ML
1 INJECTION, SOLUTION INTRAVENOUS EVERY 24 HOURS
Status: DISCONTINUED | OUTPATIENT
Start: 2025-07-14 | End: 2025-07-15

## 2025-07-14 RX ORDER — SODIUM CHLORIDE 9 MG/ML
INJECTION, SOLUTION INTRAVENOUS
Status: DISPENSED
Start: 2025-07-14 | End: 2025-07-15

## 2025-07-14 RX ORDER — GLUCOSAM/CHONDRO/HERB 149/HYAL 750-100 MG
1 TABLET ORAL DAILY
COMMUNITY

## 2025-07-14 RX ORDER — TAMSULOSIN HYDROCHLORIDE 0.4 MG/1
0.4 CAPSULE ORAL DAILY
Status: DISCONTINUED | OUTPATIENT
Start: 2025-07-14 | End: 2025-07-15 | Stop reason: HOSPADM

## 2025-07-14 RX ADMIN — MUPIROCIN: 20 OINTMENT TOPICAL at 14:43

## 2025-07-14 RX ADMIN — TAMSULOSIN HYDROCHLORIDE 0.4 MG: 0.4 CAPSULE ORAL at 11:59

## 2025-07-14 RX ADMIN — LEVOTHYROXINE SODIUM 75 MCG: 0.07 TABLET ORAL at 08:53

## 2025-07-14 RX ADMIN — METHYLPREDNISOLONE SODIUM SUCCINATE 40 MG: 40 INJECTION, POWDER, FOR SOLUTION INTRAMUSCULAR; INTRAVENOUS at 14:43

## 2025-07-14 RX ADMIN — Medication 21 PERCENT: at 21:06

## 2025-07-14 RX ADMIN — SACUBITRIL AND VALSARTAN 1 TABLET: 24; 26 TABLET, FILM COATED ORAL at 08:53

## 2025-07-14 RX ADMIN — CARVEDILOL 25 MG: 25 TABLET, FILM COATED ORAL at 08:53

## 2025-07-14 RX ADMIN — IPRATROPIUM BROMIDE AND ALBUTEROL SULFATE 3 ML: .5; 3 SOLUTION RESPIRATORY (INHALATION) at 21:03

## 2025-07-14 RX ADMIN — IPRATROPIUM BROMIDE AND ALBUTEROL SULFATE 3 ML: .5; 3 SOLUTION RESPIRATORY (INHALATION) at 14:51

## 2025-07-14 RX ADMIN — PIPERACILLIN SODIUM AND TAZOBACTAM SODIUM 3.38 G: 3; .375 INJECTION, SOLUTION INTRAVENOUS at 20:43

## 2025-07-14 RX ADMIN — IPRATROPIUM BROMIDE AND ALBUTEROL SULFATE 3 ML: .5; 3 SOLUTION RESPIRATORY (INHALATION) at 09:24

## 2025-07-14 RX ADMIN — MUPIROCIN: 20 OINTMENT TOPICAL at 08:54

## 2025-07-14 RX ADMIN — CARVEDILOL 25 MG: 25 TABLET, FILM COATED ORAL at 20:42

## 2025-07-14 RX ADMIN — ACYCLOVIR 400 MG: 200 CAPSULE ORAL at 20:42

## 2025-07-14 RX ADMIN — METHYLPREDNISOLONE SODIUM SUCCINATE 40 MG: 40 INJECTION, POWDER, FOR SOLUTION INTRAMUSCULAR; INTRAVENOUS at 22:48

## 2025-07-14 RX ADMIN — SACUBITRIL AND VALSARTAN 1 TABLET: 24; 26 TABLET, FILM COATED ORAL at 20:42

## 2025-07-14 RX ADMIN — ACYCLOVIR 400 MG: 200 CAPSULE ORAL at 08:53

## 2025-07-14 RX ADMIN — VANCOMYCIN HYDROCHLORIDE 1 G: 1 INJECTION, SOLUTION INTRAVENOUS at 21:29

## 2025-07-14 RX ADMIN — MUPIROCIN 1 APPLICATION: 20 OINTMENT TOPICAL at 20:41

## 2025-07-14 RX ADMIN — Medication 2 L/MIN: at 04:48

## 2025-07-14 RX ADMIN — METHYLPREDNISOLONE SODIUM SUCCINATE 40 MG: 40 INJECTION, POWDER, FOR SOLUTION INTRAMUSCULAR; INTRAVENOUS at 05:58

## 2025-07-14 RX ADMIN — PIPERACILLIN SODIUM AND TAZOBACTAM SODIUM 3.38 G: 3; .375 INJECTION, SOLUTION INTRAVENOUS at 05:58

## 2025-07-14 RX ADMIN — PIPERACILLIN SODIUM AND TAZOBACTAM SODIUM 3.38 G: 3; .375 INJECTION, SOLUTION INTRAVENOUS at 12:04

## 2025-07-14 ASSESSMENT — ENCOUNTER SYMPTOMS
WHEEZING: 1
COUGH: 1
CHILLS: 1
GASTROINTESTINAL NEGATIVE: 1
TREMORS: 1
PSYCHIATRIC NEGATIVE: 1
CHILLS: 0
SHORTNESS OF BREATH: 1
ENDOCRINE NEGATIVE: 1
HEMATOLOGIC/LYMPHATIC NEGATIVE: 1
MUSCULOSKELETAL NEGATIVE: 1
ALLERGIC/IMMUNOLOGIC NEGATIVE: 1
FEVER: 1
EYES NEGATIVE: 1
WEAKNESS: 1
FATIGUE: 1

## 2025-07-14 ASSESSMENT — PAIN SCALES - GENERAL
PAINLEVEL_OUTOF10: 0 - NO PAIN

## 2025-07-14 ASSESSMENT — PAIN - FUNCTIONAL ASSESSMENT
PAIN_FUNCTIONAL_ASSESSMENT: 0-10

## 2025-07-14 ASSESSMENT — ACTIVITIES OF DAILY LIVING (ADL): LACK_OF_TRANSPORTATION: NO

## 2025-07-14 NOTE — CONSULTS
Vancomycin Dosing by Pharmacy- INITIAL    Donavon Brush is a 84 y.o. year old male who Pharmacy has been consulted for vancomycin dosing for pneumonia. Based on the patient's indication and renal status this patient will be dosed based on a goal AUC of 400-600.     Renal function is currently declining.    Visit Vitals  /68 (BP Location: Right arm, Patient Position: Lying)   Pulse 70   Temp 35.9 °C (96.6 °F) (Temporal)   Resp 19        Lab Results   Component Value Date    CREATININE 1.65 (H) 2025    CREATININE 1.64 (H) 2025    CREATININE 1.44 (H) 2025    CREATININE 1.61 (H) 2025    CREATININE 1.48 (H) 2025        Patient weight is as follows:   Vitals:    25 0600   Weight: 84.2 kg (185 lb 10 oz)       Cultures:  No results found for the encounter in last 14 days.        I/O last 3 completed shifts:  In: 1150 (13.7 mL/kg) [P.O.:300; IV Piggyback:850]  Out: 1025 (12.2 mL/kg) [Urine:1025 (0.3 mL/kg/hr)]  Weight: 84.2 kg   I/O during current shift:  No intake/output data recorded.    Temp (24hrs), Av.9 °C (98.5 °F), Min:35.9 °C (96.6 °F), Max:37.8 °C (100 °F)         Assessment/Plan     Patient has already been given a loading dose of 2000 mg.  Will initiate vancomycin maintenance, 1000 mg every 24 hours.    This dosing regimen is predicted by [employer] to result in the following pharmacokinetic parameters:  Regimen: 1000 mg IV every 24 hours.  Start time: 18:50 on 2025  Exposure target: AUC24 (range) 400-600 mg/L.hr   NAR59-85: 486 mg/L.hr  AUC24,ss: 542 mg/L.hr  Probability of AUC24 > 400: 79 %  Ctrough,ss: 17.9 mg/L  Probability of Ctrough,ss > 20: 41 %    Follow-up level will be ordered on 7/15/25 at 0500 unless clinically indicated sooner.  Will continue to monitor renal function daily while on vancomycin and order serum creatinine at least every 48 hours if not already ordered.  Follow for continued vancomycin needs, clinical response, and signs/symptoms of  toxicity.       Troy Rutledge, PharmD

## 2025-07-14 NOTE — H&P
History Of Present Illness  Donavon Brush is a 84 y.o. male with past medical history of small lymphocytic leukemia, complete heart block with pacemaker, paroxysmal atrial fibrillation anticoagulated with Coumadin, coronary artery disease, hypertension, hypothyroidism who presented to the emergency department yesterday with complaints of cough, shortness of breath, hypoxia, fever, chills and wheezing.  He states he was diagnosed with pneumonia on July 3 and completed outpatient antibiotics orally.  He was on 6 L nasal cannula when he arrived in the ED and his work of breathing was increased.  SPO2 was 84% prior to the 6 L.  Lab workup showed elevated creatinine, elevated lactate, elevated BNP and troponins.  Chest x-ray showed pneumonia right upper lobe and some edema with small pleural effusions.  He was started on IV vancomycin, Zosyn and subsequently admitted to Medicine for further treatment and evaluation and close monitoring of hemodynamic status.  He was placed in ICU for BiPAP need.    VS: T37.8, HR 73, /94, respirations 30, SpO2 84% on room air  EKG: Ventricular paced rhythm  UA: 1+ protein, 11-20 WBC, >20 RBC  Significant labs: Creatinine 1.64, GFR 41, lactate 2.9, , WBC 3.4, INR 4.4, troponin 12 > 26 > 39  AB.44/36/137/24  Diagnostics: Chest x-ray: Extensive dense airspace disease in right upper lung suggesting pneumonia, mild edema with small effusions and enlargement of cardiac silhouette     Past Medical History  Past Medical History:   Diagnosis Date    A-fib (Multi)     CHF (congestive heart failure)     Chronic kidney disease     Chronic sinusitis, unspecified 2017    Coronary artery disease     Disease of thyroid gland     Eye cancer, left (Multi)     Heart disease     Hypertension     Hypothyroidism     Hypothyroidism, unspecified 10/12/2022     Surgical History  Past Surgical History:   Procedure Laterality Date    AORTIC VALVE REPLACEMENT      CARDIAC ELECTROPHYSIOLOGY  PROCEDURE N/A 06/23/2025    CARDIAC PACEMAKER PLACEMENT  09/22/2014    CATARACT EXTRACTION Bilateral     FRACTURE SURGERY      Shoulder    KNEE ARTHROPLASTY Left     PROSTATE SURGERY      TURP      Social History  He reports that he quit smoking about 25 years ago. His smoking use included cigarettes. He has never used smokeless tobacco. He reports current alcohol use of about 7.0 standard drinks of alcohol per week. He reports that he does not use drugs.    Family History  Family History   Problem Relation Name Age of Onset    Alzheimer's disease Mother          passed at 84    No Known Problems Father          passed at 66    Heart disease Brother Enrrique     No Known Problems Daughter      No Known Problems Son      No Known Problems Son        Allergies  Patient has no known allergies.    Review of Systems   Constitutional:  Positive for chills and fatigue.   HENT: Negative.     Eyes: Negative.    Respiratory:  Positive for cough, shortness of breath and wheezing.    Cardiovascular:  Positive for chest pain.   Gastrointestinal: Negative.    Endocrine: Negative.    Genitourinary: Negative.    Musculoskeletal: Negative.    Allergic/Immunologic: Negative.    Neurological:  Positive for tremors and weakness.   Hematological: Negative.    Psychiatric/Behavioral: Negative.          Physical Exam  Constitutional:       General: He is not in acute distress.     Appearance: Normal appearance. He is not toxic-appearing.      Comments: Unga   HENT:      Head: Normocephalic and atraumatic.      Mouth/Throat:      Mouth: Mucous membranes are moist.   Eyes:      Extraocular Movements: Extraocular movements intact.      Pupils: Pupils are equal, round, and reactive to light.   Cardiovascular:      Rate and Rhythm: Normal rate and regular rhythm.      Pulses: Normal pulses.      Heart sounds: Normal heart sounds. No murmur heard.     No gallop.   Pulmonary:      Effort: Pulmonary effort is normal. No respiratory distress.       "Breath sounds: Rhonchi present. No wheezing or rales.   Abdominal:      General: Bowel sounds are normal. There is no distension.      Palpations: Abdomen is soft.      Tenderness: There is no abdominal tenderness. There is no guarding or rebound.   Musculoskeletal:         General: No swelling, tenderness, deformity or signs of injury. Normal range of motion.      Cervical back: Normal range of motion and neck supple.      Right lower leg: No edema.      Left lower leg: No edema.   Skin:     General: Skin is warm and dry.      Capillary Refill: Capillary refill takes less than 2 seconds.      Coloration: Skin is not jaundiced.      Findings: No bruising or rash.   Neurological:      General: No focal deficit present.      Mental Status: He is alert and oriented to person, place, and time. Mental status is at baseline.      Cranial Nerves: No cranial nerve deficit.      Sensory: No sensory deficit.      Motor: No weakness.      Gait: Gait normal.   Psychiatric:         Mood and Affect: Mood normal.         Behavior: Behavior normal.         Thought Content: Thought content normal.         Judgment: Judgment normal.        Last Recorded Vitals  Blood pressure 125/65, pulse 70, temperature 35.9 °C (96.6 °F), temperature source Temporal, resp. rate 25, height 1.727 m (5' 8\"), weight 84.2 kg (185 lb 10 oz), SpO2 98%.    Scheduled medications  Scheduled Medications[1]  Continuous medications  Continuous Medications[2]  PRN medications  PRN Medications[3]    Relevant Results  XR chest 1 view  Result Date: 7/13/2025  1. Extensive dense airspace disease in the right upper lung. This is suggestive of underlying pneumonia. Radiographic follow-up to resolution is advised. 2. A component of mild edema with small effusions and enlargement of the cardiac silhouette present       MACRO: None   Signed by: Vicente Hadley 7/13/2025 6:52 PM Dictation workstation:   [id number]    ECG 12 lead  Result Date: 7/8/2025  Ventricular-paced " rhythm Abnormal ECG When compared with ECG of 16-OCT-2003 13:07, Electronic ventricular pacemaker has replaced Sinus rhythm Vent. rate has increased BY  29 BPM    CT chest wo IV contrast  Result Date: 7/3/2025  Dense consolidative opacity in the right upper lobe compatible with pneumonia.   11 mm focal nodular opacity in the right upper lobe and also 6 mm discrete pulmonary nodule in the left lower lobe. Several mildly prominent mediastinal lymph nodes are also noted. The patient has known history of lymphoma and progress imaging is recommended to better assess patient's underlying disease.   MACRO: None   Signed by: Donavon Barkley 7/3/2025 11:28 PM Dictation workstation:   [id number]        Latest Reference Range & Units 07/13/25 18:18 07/13/25 21:11 07/13/25 23:08 07/14/25 05:14   GLUCOSE 74 - 99 mg/dL 162 (H)   173 (H)   SODIUM 136 - 145 mmol/L 137   139   POTASSIUM 3.5 - 5.3 mmol/L 4.9   4.3   CHLORIDE 98 - 107 mmol/L 103   107   Bicarbonate 21 - 32 mmol/L 24   24   Anion Gap 10 - 20 mmol/L 15   12   Blood Urea Nitrogen 6 - 23 mg/dL 23   26 (H)   Creatinine 0.50 - 1.30 mg/dL 1.64 (H)   1.65 (H)   EGFR >60 mL/min/1.73m*2 41 (L)   41 (L)   Calcium 8.6 - 10.3 mg/dL 9.4   8.5 (L)   Albumin 3.4 - 5.0 g/dL 3.8   3.2 (L)   Alkaline Phosphatase 33 - 136 U/L 60   46   ALT 10 - 52 U/L 13   25   AST 9 - 39 U/L 16   26   Bilirubin Total 0.0 - 1.2 mg/dL 0.7   0.8   Procalcitonin <=0.07 ng/mL   2.66 (H)    Total Protein 6.4 - 8.2 g/dL 6.5   5.4 (L)   Lactate 0.4 - 2.0 mmol/L 2.9 (H) 1.6     BNP 0 - 99 pg/mL 280 (H)      Troponin I, High Sensitivity 0 - 20 ng/L 12 26 (H) 39 (H)       Latest Reference Range & Units 07/13/25 18:18 07/14/25 05:14   WBC 4.4 - 11.3 x10*3/uL 3.4 (L) 2.6 (L)   nRBC 0.0 - 0.0 /100 WBCs 0.0 0.0   RBC 4.50 - 5.90 x10*6/uL 4.62 3.69 (L)   HEMOGLOBIN 13.5 - 17.5 g/dL 14.1 11.0 (L)   HEMATOCRIT 41.0 - 52.0 % 43.7 34.5 (L)   MCV 80 - 100 fL 95 94   MCH 26.0 - 34.0 pg 30.5 29.8   MCHC 32.0 - 36.0 g/dL 32.3  31.9 (L)   RED CELL DISTRIBUTION WIDTH 11.5 - 14.5 % 13.8 13.7   Platelets 150 - 450 x10*3/uL 247 143 (L)     Assessment & Plan  Acute hypoxic respiratory failure    Acquired hypothyroidism    BPH (benign prostatic hyperplasia)    Retention of urine    Chronic systolic heart failure    Complete heart block    HTN (hypertension)    Longstanding persistent atrial fibrillation (Multi)    Small lymphocytic lymphoma (Multi)    Acute pneumonia      Acute hypoxic respiratory failure  Acute pneumonia  - Completed outpatient antibiotics without resolution of symptoms  - WBC 3.4 > 2.6  - Lactate 2.9 > 1.6  - CXR: 1. Extensive dense airspace disease in the right upper lung. This is suggestive of underlying pneumonia. Radiographic follow-up to resolution is advised. 2. A component of mild edema with small effusions and enlargement of the cardiac silhouette present  - AB.44/36/137/24.5  - Antibiotics started in ED  - Blood cultures pending  - Sputum culture if able  - Procalcitonin 2.66  - Continue antibiotics: IV zosyn, vancomycin  - Continue duonebs, steroids  - RT consult, appreciate recs  - O2 @ 2L > RA  - Keep sats >90%  - bronchial hygiene  - Follow blood cultures, WBC    Chronic systolic heart failure  Complete heart block  HTN (hypertension)  Longstanding persistent atrial fibrillation (Multi)  Artificial valve  -  -Troponin 12 > 26 > 39  -Echo: : 1. The left ventricular systolic function is normal, with a visually estimated ejection fraction of 55%. 2. Spectral Doppler shows an abnormal pattern of left ventricular diastolic filling. 3. There is moderately increased posterior left ventricular wall thickness. 4. The left atrial size is severely dilated. 5. The right atrium is moderately dilated. 6. Mild to moderate tricuspid regurgitation visualized. 7. Mild aortic valve regurgitation. 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 Mild to moderately elevated pulmonary artery pressure, estimated RVSP  50mmHg. 9. There is mild to moderate mitral valve stenosis.  -EKG: Vpaced  -continue carvedilol, Entresto, Coumadin  - Daily INR for Coumadin dosing    BPH (benign prostatic hyperplasia)  Retention of urine  CKD  -Creatinine 1.64 > 1.65  -UA: 1+protein, +WBC, +RBC  -Baseline creatinine >1.35 since 2022  -Botello placed for acute retention  -Will do void trial Tuesday morning, if failure can follow-up with outpatient urology  -Avoid nephrotoxins, renally dose meds as able  -Flomax started daily    Acquired hypothyroidism  - Continue levothyroxine    Small lymphocytic lymphoma (Multi)  - chemo pill on hold while inpatient    GI ppx: PPI  DVT ppx: Coumadin  Fluids: As needed  Electrolytes: replace as needed  Nutrition: Cardiac  Adjuncts: PIV  Code Status: Full code  Pt requires inpatient stay at this time.    SABINA Reed-CNP    Attending Attestation:    I was present with the SABINA-CNP who participated in the documentation of this note. I have personally seen and re-examined the patient and performed the medical decision-making components (assessment and plan of care). I have reviewed the documentation and verified the findings in the note as written with additions or exceptions as stated in the body of this note.    84-year-old male with past medical history of small lymphocytic leukemia, complete heart block with pacemaker, paroxysmal atrial fibrillation, CAD, hypertension and hypothyroidism presented to the emergency department for the complaint of progressive cough shortness of breath fever chills and wheezing.  He was diagnosed with pneumonia on July 3 and completed outpatient oral antibiotics.  Upon arrival to the emergency room he was on 6 L/min of nasal cannula oxygen and his work of breathing was increasing.  His oxygen saturation prior to supplemental oxygen was 84%.  Vital signs upon arrival were suggestive of blood pressure 161/94, respiratory rate 30, oxygen saturation of 84% on room air.  EKG  suggestive of ventricular paced rhythm  UA was positive for UTI  Significant labs were-creatinine 1.64, GFR 41, lactate 2.9, , WBC 3.4, INR 4.4, troponin 12> 26> 39.  Chest x-ray was suggestive of extensive dense airspace in the right upper lung suggesting pneumonia, mild edema with a small effusion and enlargement of cardiac silhouette.    Patient admitting diagnosis was     Acute hypoxic respiratory failure  Pneumonia  Chronic systolic heart failure  Longstanding persistent atrial fibrillation  Urinary retention  Chronic lymphocytic pneumonia-chemotherapy is on hold while inpatient.    -Intravenous vancomycin and Zosyn started, continue DuoNeb and a steroid, RT consulT for bronchial hygiene, blood and sputum culture is pending.  - Continue carvedilol, Entresto and Coumadin  - Daily INR check for Coumadin dosing  - Flomax has been started for urinary retention, patient is on Botello catheter for now for acute retention.    Patient has multiple medical problem and is at high risk of morbidity and mortality.    DVT prophylaxis-currently supratherapeutic but patient is on Coumadin    Adrian Roe MD  Internal Medicine.       [1] acyclovir, 400 mg, oral, BID  carvedilol, 25 mg, oral, BID  ipratropium-albuteroL, 3 mL, nebulization, TID  levothyroxine, 75 mcg, oral, Daily  methylPREDNISolone sodium succinate (PF), 40 mg, intravenous, q8h  mupirocin, , Topical, TID  oxygen, , inhalation, Continuous - Inhalation  piperacillin-tazobactam, 3.375 g, intravenous, q6h  sacubitriL-valsartan, 1 tablet, oral, BID  tamsulosin, 0.4 mg, oral, Daily  vancomycin, 1 g, intravenous, q24h  [Held by provider] venetoclax, 200 mg, oral, Daily  [Held by provider] warfarin, 7.5 mg, oral, Daily     [2] sodium chloride 0.9%, 10 mL/hr     [3] PRN medications: albuterol, oxygen, sodium chloride 0.9%, vancomycin

## 2025-07-14 NOTE — CONSULTS
CRITICAL CARE CONSULT      Hospital Day # 0    Donavon Brush  Primary Care Physician: Josie Payne DO  Primary Pulmonologist:      Subjective/Last 24 Hour Update   Mr. 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    On arrival, he was on 84% SpO? on room air, improved to 6 L nasal cannula. He was initiated on empiric antibiotics with vancomycin 2 g and zosyn 4.5 g IV, plus IV azithromycin 500 mg. He received IV fluids and a duoneb treatment. BiPAP was started at 40%, later titrated to 30%. He was ultimately transferred to the ICU and temporarily transitioned to 4 L nasal cannula for the admission interview.        He is now MRSA-positive on nasal swab. Urinalysis is abnormal (positive), and he reports incomplete bladder emptying with planned bladder scan to assess for urinary retention. He is intermittently tachypneic with exertion but resting comfortably on supplemental oxygen. Telemetry shows a junctional rhythm. Initial troponin was elevated (12 ? 26), and he is anticoagulated with warfarin, INR 4.4. No current steroids ordered. ID and pulmonary consults have been requested.        Past history: reviewed as below    Medical History[1]  Surgical History[2]  Social History[3]  Family History[4]        Objective         Vitals for last 24 hours Temperature Ins/Outs Weight   Temp:  [36.2 °C (97.2 °F)-37.8 °C (100 °F)] 36.2 °C (97.2 °F)  Heart Rate:  [70-73] 70  Resp:  [21-39] 35  BP: (102-161)/(45-94) 144/60  FiO2 (%):  [32 %-36 %] 32 % Temp (24hrs), Av °C (98.6 °F), Min:36.2 °C (97.2 °F), Max:37.8 °C (100 °F)     Intake/Output Summary (Last 24 hours) at 2025  "2300  Last data filed at 7/13/2025 2214  Gross per 24 hour   Intake 750 ml   Output --   Net 750 ml    Wt Readings from Last 7 Encounters:   07/13/25 86.1 kg (189 lb 13.1 oz)   07/09/25 83.2 kg (183 lb 8 oz)   07/04/25 82.5 kg (181 lb 14.1 oz)   06/30/25 83.3 kg (183 lb 8.5 oz)   06/24/25 84.5 kg (186 lb 4.6 oz)   06/23/25 81.6 kg (180 lb)   05/27/25 84.5 kg (186 lb 2.9 oz)    Body mass index is 28.86 kg/m².   Sats Hemodynamics Cumulative I/O Vent Settings   SpO2 Readings from Last 3 Encounters:   07/13/25 98%   07/09/25 96%   07/05/25 97%          [unfilled]   [unfilled]     Exam:    General: Elderly male, at baseline mental status, improved work of breathing  Respiratory: Tachypnea on exertion, improved on 4L NC, previously on BiPAP  CV: Junctional rhythm on monitor; hemodynamically stable  : Difficulty voiding; bladder scan pending  Neuro/Psych: No acute findings reported      Drains         Medications     Antibiotics:  Name Indication Start Date Stop Date                                             Completed Antibiotics:                                                    Scheduled Meds:Scheduled Medications[5]  Continuous Infusions:Continuous Medications[6]      Laboratory Data     ID/Cultures:    Date Result Date  Result   Blood       Respiratory       Urine       C. Diff       Flu/RSV/COVID       Other    No results found for: \"HIV1X2\"  No results found for: \"GW4RUVCV\"       Hematology  Results from last 7 days   Lab Units 07/13/25  1818   WBC AUTO x10*3/uL 3.4*   RBC AUTO x10*6/uL 4.62   HEMOGLOBIN g/dL 14.1   HEMATOCRIT % 43.7   PLATELETS AUTO x10*3/uL 247     Results from last 7 days   Lab Units 07/13/25  2111   INR  4.4*     Chemistry       Results from last 7 days   Lab Units 07/13/25  1818   BNP pg/mL 280*     Results from last 7 days   Lab Units 07/13/25  1818   SODIUM mmol/L 137   POTASSIUM mmol/L 4.9   CHLORIDE mmol/L 103   CO2 mmol/L 24   BUN mg/dL 23   CREATININE mg/dL 1.64* " "  CALCIUM mg/dL 9.4   ALBUMIN g/dL 3.8   PROTEIN TOTAL g/dL 6.5   BILIRUBIN TOTAL mg/dL 0.7   ALT U/L 13   AST U/L 16     Results from last 7 days   Lab Units 07/13/25  2111 07/13/25  1818   LACTATE mmol/L 1.6 2.9*     No lab exists for component: \"SCVO2\"      Blood Gases  Results from last 7 days   Lab Units 07/13/25  1829   POCT HCO3 CALCULATED, ARTERIAL mmol/L 24.5   POCT PH, ARTERIAL pH 7.44*   POCT PCO2, ARTERIAL mm Hg 36*   POCT PO2, ARTERIAL mm Hg 137*   FIO2 % 100          Data/Imaging     The following data have been personally reviewed by me on 7/13/2025 and are summarized below:     Date Result   EKG/Tele     Echo     CXR 7/13/25  FINDINGS:  Extensive dense airspace disease in the right upper lung. Patchy  bilateral multifocal airspace disease present as well. Small  effusions. The cardiac silhouette is mildly enlarged.      Left-sided pacemaker and sternotomy wires present       Impression:     1. Extensive dense airspace disease in the right upper lung. This is  suggestive of underlying pneumonia. Radiographic follow-up to  resolution is advised.  2. A component of mild edema with small effusions and enlargement of  the cardiac silhouette present      CT     Other     PFT's         Assessment/Plan     1. Acute Hypoxic Respiratory Failure - likely multifocal pneumonia  History of SLL and failed outpatient treatment  Resting O? improved on 4L NC, exertional tachypnea  Started on empiric Vanc + Zosyn + Azithromycin    Plan:  Restart BiPAP as tolerated per RT for ongoing support  Continue current antibiotics   Monitor O? demand, consider transition to HFNC if decompensating      2. MRSA Nasal Colonization  Positive swab; unclear if causative    Plan: Continue Vanc if suspicion for MRSA pneumonia is high; otherwise discontinue after ID input    3. Troponin Elevation  Initial 12 ? 26, likely demand ischemia vs. subacute cardiac event  Known cardiac disease, junctional rhythm    Plan:  Trend third " troponin  Obtain ECG if not already done  Monitor telemetry  Consider echo if clinical concern persists  Hold anticoagulation for now due to elevated INR    4. Supratherapeutic INR (4.4) on Warfarin    Plan:  Hold warfarin  Monitor INR daily  Assess need for reversal based on bleeding risk (none currently reported)  Readdress anticoagulation when INR <3.0    5. Urinary Retention /  symptoms  Difficulty initiating void, bladder scan pending  UA positive    Plan:  Perform bladder scan ? straight cath or Botello as indicated  Consider starting empiric treatment for UTI if not already done  Send urine culture    6. Immunocompromised Host - Small Lymphocytic Lymphoma  High risk for bacterial and opportunistic infections    Plan:  Continue broad-spectrum coverage  ID consult already ordered      7. Recent Pacemaker Placement (~1 month ago)  History of CHB  Current rhythm: junctional    Plan:  Monitor device function  Cardiology consult if concerns for pacemaker malfunction or new arrhythmia          The entirety of this visit history, physical exam, and diagnostic interpretation was done via audiovisual telemedicine.     Patient is located in [place] and I am located in [place].   Upon my own and separate evaluation, this patient had a high  probability of imminent of life-threatening deterioration due to Acute hypoxic respiratory failure ,  which required my direct attention, intervention and personal  management.    I have personally provided 60 minutes of critical care time exclusive  of time spent on separately billable procedures. Time includes review  of laboratory data, radiology results, discussion with consultants,  and monitoring of potential decompensation. Interventions were  performed as documented above.    Brandon Patino IV, MD  Pulmonary, Critical Care and Sleep Medicine  [facility]             [1]   Past Medical History:  Diagnosis Date    A-fib (Multi)     Chronic sinusitis, unspecified 05/11/2017     Sinobronchitis    Coronary artery disease     Disease of thyroid gland     Eye cancer, left (Multi)     Hypertension     Hypothyroidism     Hypothyroidism, unspecified 10/12/2022    Adult hypothyroidism    Other specified disorders of eye and adnexa 2019    Irritation of right eye    Personal history of other diseases of the respiratory system 04/10/2015    History of acute bronchitis    Personal history of other diseases of the respiratory system 2016    History of acute sinusitis   [2]   Past Surgical History:  Procedure Laterality Date    AORTIC VALVE REPLACEMENT      CARDIAC ELECTROPHYSIOLOGY PROCEDURE N/A 2025    Procedure: PPM Generator Change;  Surgeon: Jag Mendoza MD;  Location: [facility] [place];  Service: Electrophysiology;  Laterality: N/A;  CPT 94454  DX: CHB  Abbott Dual PPM    CARDIAC PACEMAKER PLACEMENT  2014    Pacemaker Placement    CATARACT EXTRACTION Bilateral     FRACTURE SURGERY      shoulder fracture    KNEE ARTHROPLASTY Left     PROSTATE SURGERY      TURP   [3]   Social History  Socioeconomic History    Marital status:    Tobacco Use    Smoking status: Former     Current packs/day: 0.00     Types: Cigarettes     Quit date:      Years since quittin.5    Smokeless tobacco: Never    Tobacco comments:     Denies SOB with activity, stairs or lying flat. Ambulates freely. No illness last 30 days. Denies personal or family reaction to anesthesia.    Vaping Use    Vaping status: Never Used   Substance and Sexual Activity    Alcohol use: Yes     Alcohol/week: 7.0 standard drinks of alcohol     Types: 7 Cans of beer per week    Drug use: Never    Sexual activity: Defer     Social Drivers of Health     Financial Resource Strain: Low Risk  (2025)    Overall Financial Resource Strain (CARDIA)     Difficulty of Paying Living Expenses: Not hard at all   Food Insecurity: No Food Insecurity (2025)    Hunger Vital Sign     Worried About Running Out  of Food in the Last Year: Never true     Ran Out of Food in the Last Year: Never true   Transportation Needs: No Transportation Needs (7/13/2025)    PRAPARE - Transportation     Lack of Transportation (Medical): No     Lack of Transportation (Non-Medical): No   Physical Activity: Sufficiently Active (7/4/2025)    Exercise Vital Sign     Days of Exercise per Week: 3 days     Minutes of Exercise per Session: 60 min   Stress: No Stress Concern Present (7/4/2025)    [employer] - Occupational Stress Questionnaire     Feeling of Stress : Not at all   Social Connections: Socially Isolated (7/4/2025)    Social Connection and Isolation Panel [NHANES]     Frequency of Communication with Friends and Family: Once a week     Frequency of Social Gatherings with Friends and Family: Once a week     Attends Scientologist Services: Never     Active Member of Clubs or Organizations: No     Attends Club or Organization Meetings: Never     Marital Status:    Intimate Partner Violence: Not At Risk (7/13/2025)    Humiliation, Afraid, Rape, and Kick questionnaire     Fear of Current or Ex-Partner: No     Emotionally Abused: No     Physically Abused: No     Sexually Abused: No   Housing Stability: Low Risk  (7/13/2025)    Housing Stability Vital Sign     Unable to Pay for Housing in the Last Year: No     Number of Times Moved in the Last Year: 0     Homeless in the Last Year: No   [4]   Family History  Problem Relation Name Age of Onset    Alzheimer's disease Mother          passed at 84    No Known Problems Father          passed at 66    Heart disease Brother Enrrique     No Known Problems Daughter      No Known Problems Son      No Known Problems Son     [5] acyclovir, 400 mg, oral, BID  [START ON 7/14/2025] allopurinol, 300 mg, oral, Daily  [START ON 7/14/2025] carvedilol, 25 mg, oral, BID  [START ON 7/14/2025] levothyroxine, 75 mcg, oral, Daily  methylPREDNISolone sodium succinate (PF), 40 mg, intravenous,  q6h  oxygen, , inhalation, Continuous - Inhalation  [START ON 7/14/2025] piperacillin-tazobactam, 3.375 g, intravenous, q6h  [6]

## 2025-07-14 NOTE — PROGRESS NOTES
Pharmacy Medication History Review    Donavon Brush is a 84 y.o. male admitted for Acute hypoxic respiratory failure. Pharmacy reviewed the patient's bvnio-uh-fxtrtepmz medications and allergies for accuracy.    Sources used to confirm medication list: Informant interview and Medication Dispense History  Informant: Patient  Informant credibility: Fair (Able to recall medication, indication, strength, frequency, and/or prescriber for >50% of medications).    Total number of adjustments: 5     Medications Added Medications Removed Medications Adjusted  Adjustments Made   Fish Oil Erythromycin Entresto Dose and frequency    Zofran Warfarin Dose and frequency     The list below reflectives the updated PTA list. Please review each medication in order reconciliation for additional clarification and justification.  Medications Prior to Admission   Medication Sig Dispense Refill Last Dose/Taking    acyclovir (Zovirax) 400 mg tablet Take 1 tablet (400 mg) by mouth 2 times a day. 60 tablet 5 7/13/2025    carvedilol (Coreg) 25 mg tablet TAKE ONE TABLET BY MOUTH TWO TIMES A DAY, IN THE MORNING AND LATE AFTERNOON. 180 tablet 0 7/13/2025    Euthyrox 75 mcg tablet TAKE ONE TABLET BY MOUTH ONCE DAILY AS DIRECTED. 90 tablet 0 7/13/2025    obinutuzumab (Gazyva) IV in 100 mL NS Infuse 100 mg into a venous catheter every 30 (thirty) days.   Past Month    sacubitriL-valsartan (Entresto) 24-26 mg tablet Take 1 tablet by mouth 2 times a day. (Patient taking differently: Take 1 tablet by mouth once every morning and 1 tablet by mouth at bedtime every other night.) 60 tablet 5 7/13/2025    venetoclax (Venclexta) 100 mg tablet Take 2 tablets (200 mg total) by mouth once daily.  Take with food. 56 tablet 3 7/13/2025    warfarin (Coumadin) 7.5 mg tablet TAKE ONE TABLET BY MOUTH ONCE DAILY AT BEDTIME (Patient taking differently: Take 1 tablet by mouth on Sunday, Monday, Tuesday, Thursday, and Saturday.  Take 0.5 tablet by mouth on Wednesday and  Friday.) 90 tablet 0 2025    allopurinol (Zyloprim) 300 mg tablet Take 1 tablet (300 mg) by mouth once daily. (Patient not taking: Reported on 2025) 30 tablet 1 Not Taking    azithromycin (Zithromax) 250 mg tablet 500 mg the first day and 250 mg for four days (Patient not taking: Reported on 2025) 6 tablet 0 Not Taking    [] cefuroxime (Ceftin) 500 mg tablet Take 1 tablet (500 mg) by mouth 2 times a day for 14 doses. (Patient not taking: Reported on 2025) 14 tablet 0 Not Taking    erythromycin (Romycin) 5 mg/gram (0.5 %) ophthalmic ointment Apply to left eye every 6 hours. APPLY 1/2 INCH RIBBON IN THE LEFT EYE UP TO THREE TIMES DAILY (Patient not taking: Reported on 2025) 3.5 g 1 Not Taking    neomycin-bacitracin-polymyxin-HC (Cortisporin) 3.5-400-10,000 mg-unit/g-1% ophthalmic ointment Apply to left eye twice a day for 2 weeks (Patient not taking: Reported on 2025) 3.5 g 0 Not Taking    omega 3-dha-epa-fish oil (Fish OiL) 1,000 (120-180) mg capsule Take 1 capsule (1,000 mg) by mouth once daily.       ondansetron (Zofran) 8 mg tablet Take 1 tablet (8 mg) by mouth every 8 hours if needed for nausea or vomiting. (Patient not taking: Reported on 2025) 30 tablet 5 Not Taking        The list below reflectives the updated allergy list. Please review each documented allergy for additional clarification and justification.  Allergies  Reviewed by Cherelle Price RN on 2025   No Known Allergies         Below are additional concerns with the patient's PTA list.  Spoke w/pt at bedside. Reviewed PTA and allergy list. Please review changes made to the PTA list in the chart above. Pt reports that he gets Entresto from the  with the patient assistance program.    Josselin Adair CPhT  Medication History Pharmacy Technician  SO[facility] 8-4:30  Available via [employer] Secure Chat  OR  [phone]

## 2025-07-14 NOTE — DISCHARGE INSTR - OTHER ORDERS
Thank you for choosing [facility] for your Health Care needs.  Also, thank you for allowing us to take you and your families preferences into account when determining your discharge plan.  Stay well!     You may receive a survey in the mail within the next couple weeks. Please take the time to complete it and return it. Your input is ALWAYS important to us. Thank you!  Your Care Transition Team - Jennifer Reyes  & Zoraida      For questions about your medications listed on your discharge instructions, please call the Nurses Station at [phone].

## 2025-07-14 NOTE — CONSULTS
Inpatient consult to Infectious Diseases  Consult performed by: Xavier Lowry MD  Consult ordered by: Panda Arenas MD            Primary MD: Josie Payne DO    Reason For Consult  Pneumonia    History Of Present Illness  Donavon Brush is a 84 y.o. male presenting with shortness of breath.  He has complex past medical history including small lymphocytic lymphoma, status post pacemaker placement for complete heart block.  He was recently admitted to the hospital for pneumonia.  At that time he presented with fever and chills.  He was treated with empiric antibiotic therapy and discharged on cefuroxime and azithromycin.  He is on vancomycin and Zosyn.  Chest x-ray remarkable for right upper lobe pneumonia  He was hypoxic but is currently on room air  Past Medical History  He has a past medical history of A-fib (Multi), CHF (congestive heart failure), Chronic kidney disease, Chronic sinusitis, unspecified (2017), Coronary artery disease, Disease of thyroid gland, Eye cancer, left (Multi), Heart disease, Hypertension, Hypothyroidism, and Hypothyroidism, unspecified (10/12/2022).    Surgical History  He has a past surgical history that includes Cardiac pacemaker placement (2014); Prostate surgery; Fracture surgery; Aortic valve replacement (); Knee Arthroplasty (Left); Cataract extraction (Bilateral); and Cardiac electrophysiology procedure (N/A, 2025).     Social History     Occupational History    Not on file   Tobacco Use    Smoking status: Former     Current packs/day: 0.00     Types: Cigarettes     Quit date: 2000     Years since quittin.5    Smokeless tobacco: Never    Tobacco comments:     Denies SOB with activity, stairs or lying flat. Ambulates freely. No illness last 30 days. Denies personal or family reaction to anesthesia.    Vaping Use    Vaping status: Never Used   Substance and Sexual Activity    Alcohol use: Yes     Alcohol/week: 7.0 standard drinks of alcohol      Types: 7 Cans of beer per week    Drug use: Never    Sexual activity: Defer     Travel History   Travel since 06/14/25    No documented travel since 06/14/25        Service:  Branch Years Served Period   [employer]       Comments:          Family History  Family History[1]  Allergies  Patient has no known allergies.     Immunization History   Administered Date(s) Administered    Flu vaccine, quadrivalent, high-dose, preservative free, age 65y+ (FLUZONE) 09/05/2020, 10/13/2021, 10/26/2022    Flu vaccine, trivalent, preservative free, HIGH-DOSE, age 65y+ (Fluzone) 09/18/2015, 09/22/2017, 09/17/2018, 10/06/2019    Flu vaccine, trivalent, preservative free, age 6 months and greater (Fluarix/Fluzone/Flulaval) 10/13/2011, 09/23/2016    Influenza, Unspecified 09/22/2014    Influenza, seasonal, injectable 10/07/2008    Moderna COVID-19 vaccine, 12 years and older (50mcg/0.5mL)(Spikevax) 10/08/2024    Moderna SARS-CoV-2 Booster 11/08/2023    Moderna SARS-CoV-2 Vaccination 02/13/2021, 03/13/2021, 11/10/2021    Novel influenza-H1N1-09, preservative-free 10/16/2009    Pfizer COVID-19 vaccine, bivalent, age 12 years and older (30 mcg/0.3 mL) 10/04/2022    Pneumococcal conjugate vaccine, 13-valent (PREVNAR 13) 09/22/2017    Pneumococcal polysaccharide vaccine, 23-valent, age 2 years and older (PNEUMOVAX 23) 10/16/2009, 09/22/2012    RSV, 60 Years And Older (AREXVY) 11/08/2023    Td (adult), unspecified 09/05/2014     Medications  Home medications:  Prescriptions Prior to Admission[2]  Current medications:  Scheduled medications  Scheduled Medications[3]  Continuous medications  Continuous Medications[4]  PRN medications  PRN Medications[5]    Review of Systems   Constitutional:  Positive for fever. Negative for chills.   Respiratory:  Positive for cough and shortness of breath.    All other systems reviewed and are negative.       Objective  Range of Vitals (last 24 hours)  Heart Rate:  [69-73]   Temp:  [35.9 °C (96.6  "°F)-37.8 °C (100 °F)]   Resp:  [18-39]   BP: (102-161)/(45-94)   Height:  [172.7 cm (5' 8\")]   Weight:  [81.6 kg (180 lb)-86.1 kg (189 lb 13.1 oz)]   SpO2:  [84 %-100 %]   Daily Weight  07/14/25 : 84.2 kg (185 lb 10 oz)    Body mass index is 28.22 kg/m².     Physical Exam  Constitutional:       Appearance: Normal appearance.   HENT:      Head: Normocephalic and atraumatic.   Cardiovascular:      Rate and Rhythm: Normal rate and regular rhythm.      Heart sounds: Normal heart sounds.   Pulmonary:      Breath sounds: Decreased breath sounds present.   Abdominal:      General: Bowel sounds are normal.      Palpations: Abdomen is soft.      Tenderness: There is no abdominal tenderness.   Musculoskeletal:      Cervical back: Normal range of motion and neck supple.      Right lower leg: No edema.      Left lower leg: No edema.   Skin:     General: Skin is warm and dry.   Neurological:      Mental Status: He is alert.   Psychiatric:         Behavior: Behavior normal. Behavior is cooperative.        Relevant Results  Outside Hospital Results    Labs  Results from last 72 hours   Lab Units 07/14/25  0514 07/13/25  1818   WBC AUTO x10*3/uL 2.6* 3.4*   HEMOGLOBIN g/dL 11.0* 14.1   HEMATOCRIT % 34.5* 43.7   PLATELETS AUTO x10*3/uL 143* 247   NEUTROS PCT AUTO %  --  32.1   LYMPHS PCT AUTO %  --  47.6   MONOS PCT AUTO %  --  13.5   EOS PCT AUTO %  --  0.0     Results from last 72 hours   Lab Units 07/14/25  0514 07/13/25  1818   SODIUM mmol/L 139 137   POTASSIUM mmol/L 4.3 4.9   CHLORIDE mmol/L 107 103   CO2 mmol/L 24 24   BUN mg/dL 26* 23   CREATININE mg/dL 1.65* 1.64*   GLUCOSE mg/dL 173* 162*   CALCIUM mg/dL 8.5* 9.4   ANION GAP mmol/L 12 15   EGFR mL/min/1.73m*2 41* 41*     Results from last 72 hours   Lab Units 07/14/25  0514 07/13/25  1818   ALK PHOS U/L 46 60   BILIRUBIN TOTAL mg/dL 0.8 0.7   PROTEIN TOTAL g/dL 5.4* 6.5   ALT U/L 25 13   AST U/L 26 16   ALBUMIN g/dL 3.2* 3.8     Estimated Creatinine Clearance: 35.2 mL/min " "(A) (by C-G formula based on SCr of 1.65 mg/dL (H)).  CRP   Date Value Ref Range Status   04/04/2018 6.07 (A) mg/dL Final     Comment:     REF VALUE  < 1.00       Sedimentation Rate   Date Value Ref Range Status   04/04/2018 12 0 - 20 mm/h Final     Comment:      Note new reference range (males age 17-50) and new    methodology as of 01/11/2018.       No results found for: \"HIV1X2\", \"HIVCONF\", \"VDXMVS0VQ\"  No results found for: \"HEPCABINIT\", \"HEPCAB\", \"HCVPCRQUANT\"  Microbiology  Reviewed-urine culture pending, positive nasal MRSA PCR  Imaging  ECG 12 lead  Result Date: 7/14/2025  Accelerated Junctional rhythm ST & T wave abnormality, consider anterolateral ischemia Abnormal ECG When compared with ECG of 03-JUL-2025 21:23, (unconfirmed) Junctional rhythm has replaced Electronic ventricular pacemaker    XR chest 1 view  Result Date: 7/13/2025  Interpreted By:  Vicente Hadley, STUDY: XR CHEST 1 VIEW;  7/13/2025 6:35 pm   INDICATION: Signs/Symptoms:Pneumonia shivering sepsis.     COMPARISON: None.   ACCESSION NUMBER(S): [id number]   ORDERING CLINICIAN: LISSET PAULA   FINDINGS: Extensive dense airspace disease in the right upper lung. Patchy bilateral multifocal airspace disease present as well. Small effusions. The cardiac silhouette is mildly enlarged.   Left-sided pacemaker and sternotomy wires present       1. Extensive dense airspace disease in the right upper lung. This is suggestive of underlying pneumonia. Radiographic follow-up to resolution is advised. 2. A component of mild edema with small effusions and enlargement of the cardiac silhouette present       MACRO: None   Signed by: Vicente Hadley 7/13/2025 6:52 PM Dictation workstation:   [id number]    ECG 12 lead  Result Date: 7/8/2025  Ventricular-paced rhythm Abnormal ECG When compared with ECG of 16-OCT-2003 13:07, Electronic ventricular pacemaker has replaced Sinus rhythm Vent. rate has increased BY  29 BPM    CT chest wo IV contrast  Result Date: " 7/3/2025  Interpreted By:  Donavon Barkley, STUDY: CT CHEST WO IV CONTRAST;  7/3/2025 10:10 pm   INDICATION: Signs/Symptoms:concern for pneumonia.   COMPARISON: 1/14/2015 PET/CT   ACCESSION NUMBER(S): [id number]   ORDERING CLINICIAN: DEBBIE COOK   TECHNIQUE: Contiguous axial images of the chest were obtained without intravenous contrast. Coronal and sagittal reformatted images were obtained from the axial images.   FINDINGS: The examination is limited secondary to patient motion and lack of contrast contrast.   No axillary lymphadenopathy. 1.1 cm AP window lymph node and 1.5 cm paratracheal lymph node. Limited evaluation for hilar lymphadenopathy on noncontrast examination   There is cardiomegaly. Coronary artery atherosclerotic calcifications. No significant pericardial effusion. Atherosclerotic calcification of the thoracic aorta.   There is dense consolidative opacity in the right upper lobe. 11 mm focal nodular opacity in the right upper lobe 6 mm pulmonary nodule in the left lower lobe. Bilateral calcified pleural plaques. No pneumothorax.   Limited evaluation of the upper abdomen.   Multilevel degenerative change of the thoracic spine.       Dense consolidative opacity in the right upper lobe compatible with pneumonia.   11 mm focal nodular opacity in the right upper lobe and also 6 mm discrete pulmonary nodule in the left lower lobe. Several mildly prominent mediastinal lymph nodes are also noted. The patient has known history of lymphoma and progress imaging is recommended to better assess patient's underlying disease.   MACRO: None   Signed by: Donavon Barkley 7/3/2025 11:28 PM Dictation workstation:   [id number]    Electrophysiology procedure  Result Date: 6/23/2025  Table formatting from the original result was not included. Procedure Date: 6/23/2025 Attending:    * Jag Mendoza - Primary Resident/Fellow/Other Assistant: Surgeons and Role: * No surgeons found with a matching role * Indications:  Pre-op Diagnosis    * Complete heart block [I44.2] Post-procedure diagnosis: Post-op Diagnosis    * Complete heart block [I44.2] Procedure(s): PPM Generator Change 81367 - NM REMVL PERM PM PLS GEN W/REPL PLSE GEN 2 LEAD SYS Procedure Findings: See below Description of the Procedure: The patient was brought to the EP laboratory while in a paced rhythm. fentanyl and Versed were used for sedation and bupivacaine for local anesthetic .  We prepped and draped the upper left chest in the usual sterile fashion and placed a 3 centimeter incision overlying the previous 1 the left deltopectoral groove .  The device was freed up from scar tissue and the pocket was revised to accommodate the larger pacemaker generator for the device.The chronic leads had normal electricals The pocket was closed with 3 layers of suture an interrupted 0 Vicryl layer, A 3.0 V lock layer and a 4.0 V lock continuous layer for the subcuticular layer.  An antibiotic tyrex pouch was utilized and Steri-Strips and island dressing were applied are no complications The new device is MRI safe Summary:  Successful dual-chamber pacemaker replacement   Complications: none Stents/Implants: Implants    Implant    Pacemaker, Generator, Dual Assurity Mri - [id number] - Implanted    Inventory item: PACEMAKER, GENERATOR, DUAL ASSURITY MRI Model/Cat number: [id number]  Serial number: [id number] : [employer]  Lot number: [id number] Device identifier: [id number]  GUDID Information   Request status Successful    Brand name: Assurity MRI™ Version/Model: QC4649  Company name: [employer] MRI safety info as of 6/23/25: MR Conditional  Contains dry or latex rubber: No    GMDN P.T. name: Dual-chamber implantable pacemaker, rate-responsive      As of 6/23/2025   Status: Implanted        Anticoagulation/Antiplatelet Plan: none Estimated Blood Loss: 25 mL Anesthesia: Moderate Sedation Anesthesia Staff: No anesthesia staff entered. Any Specimen(s) Removed:  No specimens collected during this procedure. Disposition: stable      Assessment/Plan   Acute hypoxic respiratory failure, resolved  Positive nasal MRSA colonization  Right-sided pneumonia, gram-positive and gram-negative versus atypical  Small lymphocytic lymphoma  Chronic kidney disease stage III  Leukopenia    Continue vancomycin  Continue Zosyn  Mycoplasma IgM  Oxygen as needed  Monitor renal function  Follow-up blood cultures  Decolonize per protocol  Sputum culture if able    This is a complex infectious disease issue and the following was performed today (for more details please see the above note): Management decisions reflecting the added complexity (e.g., changes in antimicrobial therapy, infection control strategies).     Xavier Lowry MD         [1]   Family History  Problem Relation Name Age of Onset    Alzheimer's disease Mother          passed at 84    No Known Problems Father          passed at 66    Heart disease Brother Enrrique     No Known Problems Daughter      No Known Problems Son      No Known Problems Son     [2]   Medications Prior to Admission   Medication Sig Dispense Refill Last Dose/Taking    acyclovir (Zovirax) 400 mg tablet Take 1 tablet (400 mg) by mouth 2 times a day. 60 tablet 5 7/13/2025    carvedilol (Coreg) 25 mg tablet TAKE ONE TABLET BY MOUTH TWO TIMES A DAY, IN THE MORNING AND LATE AFTERNOON. 180 tablet 0 7/13/2025    Euthyrox 75 mcg tablet TAKE ONE TABLET BY MOUTH ONCE DAILY AS DIRECTED. 90 tablet 0 7/13/2025    obinutuzumab (Gazyva) IV in 100 mL NS Infuse 100 mg into a venous catheter every 30 (thirty) days.   Past Month    ondansetron (Zofran) 8 mg tablet Take 1 tablet (8 mg) by mouth every 8 hours if needed for nausea or vomiting. 30 tablet 5 Past Month    sacubitriL-valsartan (Entresto) 24-26 mg tablet Take 1 tablet by mouth 2 times a day. 60 tablet 5 7/13/2025    venetoclax (Venclexta) 100 mg tablet Take 2 tablets (200 mg total) by mouth once daily.  Take with  food. 56 tablet 3 2025    warfarin (Coumadin) 7.5 mg tablet TAKE ONE TABLET BY MOUTH ONCE DAILY AT BEDTIME 90 tablet 0 2025    allopurinol (Zyloprim) 300 mg tablet Take 1 tablet (300 mg) by mouth once daily. (Patient not taking: Reported on 2025) 30 tablet 1 Not Taking    azithromycin (Zithromax) 250 mg tablet 500 mg the first day and 250 mg for four days (Patient not taking: Reported on 2025) 6 tablet 0 Not Taking    [] cefuroxime (Ceftin) 500 mg tablet Take 1 tablet (500 mg) by mouth 2 times a day for 14 doses. (Patient not taking: Reported on 2025) 14 tablet 0 Not Taking    erythromycin (Romycin) 5 mg/gram (0.5 %) ophthalmic ointment Apply to left eye every 6 hours. APPLY 1/2 INCH RIBBON IN THE LEFT EYE UP TO THREE TIMES DAILY 3.5 g 1     neomycin-bacitracin-polymyxin-HC (Cortisporin) 3.5-400-10,000 mg-unit/g-1% ophthalmic ointment Apply to left eye twice a day for 2 weeks (Patient not taking: Reported on 2025) 3.5 g 0 Not Taking   [3] acyclovir, 400 mg, oral, BID  carvedilol, 25 mg, oral, BID  ipratropium-albuteroL, 3 mL, nebulization, TID  levothyroxine, 75 mcg, oral, Daily  methylPREDNISolone sodium succinate (PF), 40 mg, intravenous, q6h  mupirocin, , Topical, TID  oxygen, , inhalation, Continuous - Inhalation  piperacillin-tazobactam, 3.375 g, intravenous, q6h  sacubitriL-valsartan, 1 tablet, oral, BID  vancomycin, 1 g, intravenous, q24h  venetoclax, 200 mg, oral, Daily  warfarin, 7.5 mg, oral, Daily    [4] sodium chloride 0.9%, 10 mL/hr    [5] PRN medications: oxygen, sodium chloride 0.9%, vancomycin

## 2025-07-14 NOTE — PROGRESS NOTES
Confirmed address and pharmacy. PCP is Josie Payne.      07/14/25 1020   Discharge Planning   Living Arrangements Spouse/significant other   Support Systems Spouse/significant other;Children   Assistance Needed Patient lives in a 2 story house on [place] with his wife.  He says he is independent with ADLS, IADLS, ambulation and drives.   He has a cane he only uses if necessary.   He has a tub bath with grab bar.   Type of Residence Private residence   Number of Stairs to Enter Residence 1   Number of Stairs Within Residence 12  (upstairs/basement doesn't utilize either)   Do you have animals or pets at home? Yes   Type of Animals or Pets 1 cat and 1 horse   Home or Post Acute Services None   Expected Discharge Disposition Home   Does the patient need discharge transport arranged? No   Financial Resource Strain   How hard is it for you to pay for the very basics like food, housing, medical care, and heating? Not hard   Housing Stability   In the last 12 months, was there a time when you were not able to pay the mortgage or rent on time? N   In the past 12 months, how many times have you moved where you were living? 0   At any time in the past 12 months, were you homeless or living in a shelter (including now)? N   Transportation Needs   In the past 12 months, has lack of transportation kept you from medical appointments or from getting medications? no   In the past 12 months, has lack of transportation kept you from meetings, work, or from getting things needed for daily living? No   Stroke Family Assessment   Stroke Family Assessment Needed No   Intensity of Service   Intensity of Service 0-30 min

## 2025-07-14 NOTE — NURSING NOTE
0800 Patient is on nasal cannula 2 liters, maintaining pulse ox > 95%.. lung sounds rhonchi in left base, has no c/o pain    0924 respiratory turned nasal oxygen off, pt tolerating well.    1030 Pt to be transferred to floor, waiting for bed    1605 pt transferred to M/S  report given.

## 2025-07-14 NOTE — CARE PLAN
The patient's goals for the shift include  no pain    The clinical goals for the shift include Patient will maintain pulseox >92% this shift.    Over the shift, the patient did make progress toward the following goals. Tolerated RA,. Transferred to [facility]. No c/o pain.      Problem: Safety - Adult  Goal: Free from fall injury  Outcome: Progressing     Problem: Discharge Planning  Goal: Discharge to home or other facility with appropriate resources  Outcome: Progressing     Problem: Chronic Conditions and Co-morbidities  Goal: Patient's chronic conditions and co-morbidity symptoms are monitored and maintained or improved  Outcome: Progressing     Problem: Nutrition  Goal: Nutrient intake appropriate for maintaining nutritional needs  Outcome: Progressing     Problem: Fall/Injury  Goal: Not fall by end of shift  Outcome: Progressing  Goal: Be free from injury by end of the shift  Outcome: Progressing  Goal: Verbalize understanding of personal risk factors for fall in the hospital  Outcome: Progressing  Goal: Verbalize understanding of risk factor reduction measures to prevent injury from fall in the home  Outcome: Progressing  Goal: Use assistive devices by end of the shift  Outcome: Progressing  Goal: Pace activities to prevent fatigue by end of the shift  Outcome: Progressing     Problem: Respiratory  Goal: Clear secretions with interventions this shift  Outcome: Progressing  Goal: Minimize anxiety/maximize coping throughout shift  Outcome: Progressing  Goal: Minimal/no exertional discomfort or dyspnea this shift  Outcome: Progressing  Goal: No signs of respiratory distress (eg. Use of accessory muscles. Peds grunting)  Outcome: Progressing  Goal: Patent airway maintained this shift  Outcome: Progressing  Goal: Tolerate mechanical ventilation evidenced by VS/agitation level this shift  Outcome: Progressing  Goal: Tolerate pulmonary toileting this shift  Outcome: Progressing  Goal: Verbalize decreased shortness of  breath this shift  Outcome: Progressing  Goal: Wean oxygen to maintain O2 saturation per order/standard this shift  Outcome: Progressing  Goal: Increase self care and/or family involvement in next 24 hours  Outcome: Progressing     Problem: Infective UTI/pyelonephritis  Goal: Follows fluid restrictions or becomes normovolemic  Outcome: Progressing  Goal: Manages/understands urgency, continence  Outcome: Progressing  Goal: No signs of neurosensory, musculoskeletal, cardiac changes  Outcome: Progressing  Goal: No worsening signs of infection  Outcome: Progressing  Goal: Stable weight and I&O  Outcome: Progressing

## 2025-07-14 NOTE — CARE PLAN
The patient's goals for the shift include sleep.    The clinical goals for the shift include Patient to tolerate BIPAP machine by end of shift    Over the shift, the patient did not make progress toward the following goals. Patient admitted from home with wife. Patient is A/OX4, independent with ADLs. Patient denies O2 use at home, arrived to ICU on 4L NC acute and SOB. Telemetry v-paced. Scar to left shoulder from recent pacer placement, well approximated, intact and SHERIN. Patient tolerated BIPAP at 35% overnight. Improvement noted in RR and work of breathing improved. IV steroid and antibiotic started and tolerating well. Patient retaining urine with urgency and difficulty starting stream, 16 F coude catheter placed on 7/13/25. BM on 7/14/25. Patient now on 2L NC O2 and tolerating free of respiratory distress.     Problem: Discharge Planning  Goal: Discharge to home or other facility with appropriate resources  Outcome: Not Progressing     Problem: Chronic Conditions and Co-morbidities  Goal: Patient's chronic conditions and co-morbidity symptoms are monitored and maintained or improved  Outcome: Not Progressing     Problem: Nutrition  Goal: Nutrient intake appropriate for maintaining nutritional needs  Outcome: Not Progressing     Problem: Fall/Injury  Goal: Verbalize understanding of personal risk factors for fall in the hospital  Outcome: Not Progressing  Goal: Verbalize understanding of risk factor reduction measures to prevent injury from fall in the home  Outcome: Not Progressing  Goal: Use assistive devices by end of the shift  Outcome: Not Progressing/  Goal: Pace activities to prevent fatigue by end of the shift  Outcome: Not Progressing     Problem: Respiratory  Goal: Clear secretions with interventions this shift  Outcome: Not Progressing     Problem: Infective UTI/pyelonephritis  Goal: Manages/understands urgency, continence  Outcome: Not Progressing     Problem: Bladder/Voiding  Goal: LTG - I will  complete bladder elimination  Outcome: Not Progressing  Goal: LTG - I will utilize adaptive techniques/equipment to complete bladder elimination  Outcome: Not Progressing  Goal: LTG - I will achieve acceptable level of continence  Outcome: Not Progressing  Goal: STG - I demonstrate the ability to take care of indwelling catheter  Outcome: Not Progressing  Goal: STG - I demonstrate self-cath technique using clean technique and care of the catheter  Outcome: Not Progressing  Goal: STG - I demonstrate no accidents  Outcome: Not Progressing  Goal: STG - I will remain continent  Outcome: Not Progressing  Goal: STG - I will be able to empty bladder  Outcome: Not Progressing  Goal: STG - I demonstrate understanding of self catheterization schedule by completing task on time  Outcome: Not Progressing  Goal: STG - I participate in bladder program by expressing need to void  Outcome: Not Progressing  Goal: STG - I participate in bladder program by adhering to implemented toileting schedule  Outcome: Not Progressing

## 2025-07-14 NOTE — NURSING NOTE
1615: pt transferred to room 236 on MedSurg from ICU. VSS, Tolerating RA, SpO2 @ 95%. No c/o pain. Call bell in reach, bed alarm on.

## 2025-07-15 ENCOUNTER — APPOINTMENT (OUTPATIENT)
Facility: CLINIC | Age: 85
End: 2025-07-15
Payer: MEDICARE

## 2025-07-15 VITALS
HEART RATE: 70 BPM | WEIGHT: 184.3 LBS | TEMPERATURE: 97.5 F | DIASTOLIC BLOOD PRESSURE: 66 MMHG | OXYGEN SATURATION: 97 % | SYSTOLIC BLOOD PRESSURE: 146 MMHG | BODY MASS INDEX: 27.93 KG/M2 | HEIGHT: 68 IN | RESPIRATION RATE: 18 BRPM

## 2025-07-15 LAB
ANION GAP SERPL CALC-SCNC: 12 MMOL/L (ref 10–20)
BACTERIA UR CULT: NO GROWTH
BUN SERPL-MCNC: 27 MG/DL (ref 6–23)
CALCIUM SERPL-MCNC: 8.6 MG/DL (ref 8.6–10.3)
CHLORIDE SERPL-SCNC: 105 MMOL/L (ref 98–107)
CO2 SERPL-SCNC: 25 MMOL/L (ref 21–32)
CREAT SERPL-MCNC: 1.25 MG/DL (ref 0.5–1.3)
EGFRCR SERPLBLD CKD-EPI 2021: 57 ML/MIN/1.73M*2
ERYTHROCYTE [DISTWIDTH] IN BLOOD BY AUTOMATED COUNT: 13.6 % (ref 11.5–14.5)
GLUCOSE SERPL-MCNC: 197 MG/DL (ref 74–99)
HCT VFR BLD AUTO: 33.6 % (ref 41–52)
HGB BLD-MCNC: 11 G/DL (ref 13.5–17.5)
INR PPP: 3.8 (ref 0.9–1.1)
MCH RBC QN AUTO: 29.9 PG (ref 26–34)
MCHC RBC AUTO-ENTMCNC: 32.7 G/DL (ref 32–36)
MCV RBC AUTO: 91 FL (ref 80–100)
NRBC BLD-RTO: 0 /100 WBCS (ref 0–0)
PLATELET # BLD AUTO: 160 X10*3/UL (ref 150–450)
POTASSIUM SERPL-SCNC: 3.8 MMOL/L (ref 3.5–5.3)
PROTHROMBIN TIME: 42.6 SECONDS (ref 9.8–12.4)
RBC # BLD AUTO: 3.68 X10*6/UL (ref 4.5–5.9)
SODIUM SERPL-SCNC: 138 MMOL/L (ref 136–145)
VANCOMYCIN SERPL-MCNC: 13.4 UG/ML (ref 5–20)
WBC # BLD AUTO: 2.1 X10*3/UL (ref 4.4–11.3)

## 2025-07-15 PROCEDURE — 94760 N-INVAS EAR/PLS OXIMETRY 1: CPT | Mod: IPSPLIT

## 2025-07-15 PROCEDURE — 36415 COLL VENOUS BLD VENIPUNCTURE: CPT | Mod: IPSPLIT | Performed by: NURSE PRACTITIONER

## 2025-07-15 PROCEDURE — 2500000002 HC RX 250 W HCPCS SELF ADMINISTERED DRUGS (ALT 637 FOR MEDICARE OP, ALT 636 FOR OP/ED): Mod: IPSPLIT | Performed by: NURSE PRACTITIONER

## 2025-07-15 PROCEDURE — 85610 PROTHROMBIN TIME: CPT | Mod: IPSPLIT | Performed by: NURSE PRACTITIONER

## 2025-07-15 PROCEDURE — 80202 ASSAY OF VANCOMYCIN: CPT | Mod: IPSPLIT | Performed by: NURSE PRACTITIONER

## 2025-07-15 PROCEDURE — 94640 AIRWAY INHALATION TREATMENT: CPT | Mod: IPSPLIT

## 2025-07-15 PROCEDURE — 2500000004 HC RX 250 GENERAL PHARMACY W/ HCPCS (ALT 636 FOR OP/ED): Mod: JZ,IPSPLIT | Performed by: NURSE PRACTITIONER

## 2025-07-15 PROCEDURE — 99239 HOSP IP/OBS DSCHRG MGMT >30: CPT | Performed by: NURSE PRACTITIONER

## 2025-07-15 PROCEDURE — 80048 BASIC METABOLIC PNL TOTAL CA: CPT | Mod: IPSPLIT | Performed by: NURSE PRACTITIONER

## 2025-07-15 PROCEDURE — 2500000001 HC RX 250 WO HCPCS SELF ADMINISTERED DRUGS (ALT 637 FOR MEDICARE OP): Mod: IPSPLIT | Performed by: NURSE PRACTITIONER

## 2025-07-15 PROCEDURE — 86738 MYCOPLASMA ANTIBODY: CPT | Performed by: INTERNAL MEDICINE

## 2025-07-15 PROCEDURE — 2500000004 HC RX 250 GENERAL PHARMACY W/ HCPCS (ALT 636 FOR OP/ED): Mod: IPSPLIT | Performed by: NURSE PRACTITIONER

## 2025-07-15 PROCEDURE — 85027 COMPLETE CBC AUTOMATED: CPT | Mod: IPSPLIT | Performed by: NURSE PRACTITIONER

## 2025-07-15 RX ORDER — ALBUTEROL SULFATE 90 UG/1
2 INHALANT RESPIRATORY (INHALATION) EVERY 4 HOURS PRN
Qty: 8 G | Refills: 0 | Status: SHIPPED | OUTPATIENT
Start: 2025-07-15 | End: 2025-07-25

## 2025-07-15 RX ORDER — AMOXICILLIN AND CLAVULANATE POTASSIUM 875; 125 MG/1; MG/1
1 TABLET, FILM COATED ORAL 2 TIMES DAILY
Qty: 14 TABLET | Refills: 0 | Status: SHIPPED | OUTPATIENT
Start: 2025-07-15 | End: 2025-07-22

## 2025-07-15 RX ORDER — PREDNISONE 10 MG/1
TABLET ORAL
Qty: 12 TABLET | Refills: 0 | Status: SHIPPED | OUTPATIENT
Start: 2025-07-15 | End: 2025-07-21

## 2025-07-15 RX ORDER — TAMSULOSIN HYDROCHLORIDE 0.4 MG/1
0.4 CAPSULE ORAL DAILY
Qty: 7 CAPSULE | Refills: 0 | Status: SHIPPED | OUTPATIENT
Start: 2025-07-16 | End: 2025-07-23

## 2025-07-15 RX ADMIN — CARVEDILOL 25 MG: 25 TABLET, FILM COATED ORAL at 08:15

## 2025-07-15 RX ADMIN — ACYCLOVIR 400 MG: 200 CAPSULE ORAL at 08:15

## 2025-07-15 RX ADMIN — TAMSULOSIN HYDROCHLORIDE 0.4 MG: 0.4 CAPSULE ORAL at 08:15

## 2025-07-15 RX ADMIN — IPRATROPIUM BROMIDE AND ALBUTEROL SULFATE 3 ML: .5; 3 SOLUTION RESPIRATORY (INHALATION) at 09:57

## 2025-07-15 RX ADMIN — PIPERACILLIN SODIUM AND TAZOBACTAM SODIUM 3.38 G: 3; .375 INJECTION, SOLUTION INTRAVENOUS at 00:03

## 2025-07-15 RX ADMIN — METHYLPREDNISOLONE SODIUM SUCCINATE 40 MG: 40 INJECTION, POWDER, FOR SOLUTION INTRAMUSCULAR; INTRAVENOUS at 06:46

## 2025-07-15 RX ADMIN — SACUBITRIL AND VALSARTAN 1 TABLET: 24; 26 TABLET, FILM COATED ORAL at 08:15

## 2025-07-15 RX ADMIN — LEVOTHYROXINE SODIUM 75 MCG: 0.07 TABLET ORAL at 08:15

## 2025-07-15 RX ADMIN — PIPERACILLIN SODIUM AND TAZOBACTAM SODIUM 3.38 G: 3; .375 INJECTION, SOLUTION INTRAVENOUS at 06:46

## 2025-07-15 RX ADMIN — MUPIROCIN: 20 OINTMENT TOPICAL at 08:16

## 2025-07-15 ASSESSMENT — PAIN - FUNCTIONAL ASSESSMENT: PAIN_FUNCTIONAL_ASSESSMENT: 0-10

## 2025-07-15 ASSESSMENT — PAIN SCALES - GENERAL
PAINLEVEL_OUTOF10: 0 - NO PAIN
PAINLEVEL_OUTOF10: 0 - NO PAIN

## 2025-07-15 NOTE — PROGRESS NOTES
Vancomycin Dosing by Pharmacy- FOLLOW UP    Donavon Brush is a 84 y.o. year old male who Pharmacy has been consulted for vancomycin dosing for pneumonia. Based on the patient's indication and renal status this patient is being dosed based on a goal AUC of 400-600.     Renal function is currently improving.    Current vancomycin dose: 1000 mg given every 24 hours    Estimated vancomycin AUC on current dose: 398 mg/L.hr     Visit Vitals  /68 (BP Location: Left arm, Patient Position: Sitting)   Pulse 70   Temp 36.4 °C (97.5 °F) (Temporal)   Resp 18        Lab Results   Component Value Date    CREATININE 1.25 07/15/2025    CREATININE 1.65 (H) 2025    CREATININE 1.64 (H) 2025    CREATININE 1.44 (H) 2025    CREATININE 1.48 (H) 2025        Patient weight is as follows:   Vitals:    07/15/25 0500   Weight: 83.6 kg (184 lb 4.9 oz)       Cultures:  No results found for the encounter in last 14 days.       I/O last 3 completed shifts:  In: 2790 (33.4 mL/kg) [P.O.:1540; IV Piggyback:1250]  Out: 2708 (32.4 mL/kg) [Urine:2708 (0.9 mL/kg/hr)]  Weight: 83.6 kg   I/O during current shift:  I/O this shift:  In: 890 [P.O.:840; IV Piggyback:50]  Out: 350 [Urine:350]    Temp (24hrs), Av.1 °C (97 °F), Min:35.2 °C (95.4 °F), Max:36.4 °C (97.5 °F)      Assessment/Plan    Below goal AUC. Orders placed for new vancomcyin regimen of 1250 mg every 24 hours to begin at 21:00.     This dosing regimen is predicted by [employer] to result in the following pharmacokinetic parameters:  Loading dose: N/A  Regimen: 1250 mg IV every 24 hours.  Start time: 21:29 on 07/15/2025  Exposure target: AUC24 (range) 400-600 mg/L.hr   [id number]: 426 mg/L.hr  AUC24,ss: 496 mg/L.hr  Probability of AUC24 > 400: 79 %  Ctrough,ss: 16.2 mg/L  Probability of Ctrough,ss > 20: 28 %    The next level will be obtained on 25 at 05:00. May be obtained sooner if clinically indicated.   Will continue to monitor renal function daily while on  vancomycin and order serum creatinine at least every 48 hours if not already ordered.  Follow for continued vancomycin needs, clinical response, and signs/symptoms of toxicity.       Gail Crum, PharmD

## 2025-07-15 NOTE — ED PROVIDER NOTES
Chief Complaint: Fever, on chemotherapy  HPI: This is an 84-year-old male, presenting to the emergency department for evaluation of a fever of 103 noted at home.  Patient states that he was outside working in his yard for about 6 hours, and when he came in he felt hot.  He had a fever at that time, and the fever did not resolve, and so he presents to the emergency department.  Patient is currently on chemotherapy for lymphoma.  Patient denies any other symptoms at this time.    Medical History[1]   Surgical History[2]    Physical Exam  Vitals and nursing note reviewed.   Constitutional:       Appearance: Normal appearance.   HENT:      Head: Normocephalic and atraumatic.      Mouth/Throat:      Mouth: Mucous membranes are moist.   Eyes:      Extraocular Movements: Extraocular movements intact.   Cardiovascular:      Rate and Rhythm: Normal rate and regular rhythm.   Pulmonary:      Effort: Pulmonary effort is normal.   Abdominal:      General: Abdomen is flat.      Palpations: Abdomen is soft.   Skin:     General: Skin is warm.   Neurological:      General: No focal deficit present.      Mental Status: He is alert.   Psychiatric:         Mood and Affect: Mood normal.            ED Course/[facility]  Diagnoses as of 07/15/25 0211   Acute pneumonia       This is a 84 y.o. male presenting to the ED for evaluation of fever which began earlier today.  Patient is on chemotherapy for lymphoma.  On physical exam, patient is resting comfortably in the bed, no acute distress.  Heart is regular rate and rhythm, lungs are clear to auscultation bilaterally.  Patient is febrile in the department with a temperature of 101.6.  He does have a audible cough that is wet sounding.  Lab work  was concerning for an RAYNE, and CT chest was concerning for pneumonia, likely causing patient's fever.  Patient was covered with broad-spectrum antibiotics given his history of fever on chemotherapy, however was not found to be neutropenic in the emergency  department.  I do feel the patient would benefit from admission for continued IV antibiotics for his pneumonia.  He was ultimately admitted in stable condition.      Final Impression  1.  Pneumonia  Disposition/Plan: Admit to medicine  Condition at disposition: Stable.     Alejandra Saini DO  Emergency Medicine Physician       [1]   Past Medical History:  Diagnosis Date    A-fib (Multi)     CHF (congestive heart failure)     Chronic kidney disease     Chronic sinusitis, unspecified 05/11/2017    Coronary artery disease     Disease of thyroid gland     Eye cancer, left (Multi)     Heart disease     Hypertension     Hypothyroidism     Hypothyroidism, unspecified 10/12/2022   [2]   Past Surgical History:  Procedure Laterality Date    AORTIC VALVE REPLACEMENT  2003    CARDIAC ELECTROPHYSIOLOGY PROCEDURE N/A 06/23/2025    CARDIAC PACEMAKER PLACEMENT  09/22/2014    CATARACT EXTRACTION Bilateral     FRACTURE SURGERY      Shoulder    KNEE ARTHROPLASTY Left     PROSTATE SURGERY      TURP        Alejandra Saini,   07/15/25 0215       Alejandra Saini,   07/15/25 9836

## 2025-07-15 NOTE — PROGRESS NOTES
07/15/25 1100   Discharge Planning   Expected Discharge Disposition Home  ([facility] OFFSITE: Pt dc today. Pt didn't answer phone. Spoke with spouse and she is aware of and fine with dc today. Spouse denied home going needs. Scripts sent to [facility])

## 2025-07-15 NOTE — CARE PLAN
The clinical goals for the shift include Patient will tolerate IV abx and remain free from resp distress this shift    Over the shift, the patient tolerated IV abx well with no adverse effects. Vitals were stable, no reports of pain. Patient experienced no respiratory distress. Botello catheter was removed and patient voided successfully. Discharge instructions were reviewed with patient who confirmed understanding. IV removed and last vitals obtained. Patient discharged home at 1224.

## 2025-07-15 NOTE — DISCHARGE SUMMARY
Discharge Diagnosis  Acute hypoxic respiratory failure       Discharge Meds     Medication List      START taking these medications     albuterol 90 mcg/actuation inhaler; Commonly known as: Ventolin HFA;   Inhale 2 puffs every 4 hours if needed for wheezing or shortness of breath   for up to 10 days.   amoxicillin-clavulanate 875-125 mg tablet; Commonly known as: Augmentin;   Take 1 tablet by mouth 2 times a day for 7 days.   predniSONE 10 mg tablet; Commonly known as: Deltasone; Take 3 tablets   (30 mg) by mouth once daily for 2 days, THEN 2 tablets (20 mg) once daily   for 2 days, THEN 1 tablet (10 mg) once daily for 2 days.; Start taking on:   July 15, 2025   tamsulosin 0.4 mg 24 hr capsule; Commonly known as: Flomax; Take 1   capsule (0.4 mg) by mouth once daily for 7 days. Do not crush, chew, or   split.; Start taking on: July 16, 2025     CONTINUE taking these medications     acyclovir 400 mg tablet; Commonly known as: Zovirax; Take 1 tablet (400   mg) by mouth 2 times a day.   carvedilol 25 mg tablet; Commonly known as: Coreg; TAKE ONE TABLET BY   MOUTH TWO TIMES A DAY, IN THE MORNING AND LATE AFTERNOON.   Entresto 24-26 mg tablet; Generic drug: sacubitriL-valsartan; Take 1   tablet by mouth 2 times a day.   Euthyrox 75 mcg tablet; Generic drug: levothyroxine; TAKE ONE TABLET BY   MOUTH ONCE DAILY AS DIRECTED.   Fish OiL 1,000 (120-180) mg capsule; Generic drug: omega 3-dha-epa-fish   oil   obinutuzumab (Gazyva) IV in 100 mL NS   Venclexta 100 mg tablet; Generic drug: venetoclax; Take 2 tablets (200   mg total) by mouth once daily.  Take with food.   warfarin 7.5 mg tablet; Commonly known as: Coumadin; TAKE ONE TABLET BY   MOUTH ONCE DAILY AT BEDTIME     STOP taking these medications     allopurinol 300 mg tablet; Commonly known as: Zyloprim   azithromycin 250 mg tablet; Commonly known as: Zithromax   cefuroxime 500 mg tablet; Commonly known as: Ceftin   erythromycin 5 mg/gram (0.5 %) ophthalmic ointment;  "Commonly known as:   Romycin   neomycin-bacitracin-polymyxin-HC 3.5-400-10,000 mg-unit/g-1% ophthalmic   ointment; Commonly known as: Cortisporin   ondansetron 8 mg tablet; Commonly known as: Zofran       Test Results Pending At Discharge  Pending Labs       Order Current Status    Mycoplasma pneumoniae antibody, IgM In process    Blood Culture Preliminary result    Blood Culture Preliminary result            Hospital Course   HPI: \"Donavon Brush is a 84 y.o. male with past medical history of small lymphocytic leukemia, complete heart block with pacemaker, paroxysmal atrial fibrillation anticoagulated with Coumadin, coronary artery disease, hypertension, hypothyroidism who presented to the emergency department yesterday with complaints of cough, shortness of breath, hypoxia, fever, chills and wheezing.  He states he was diagnosed with pneumonia on July 3 and completed outpatient antibiotics orally.  He was on 6 L nasal cannula when he arrived in the ED and his work of breathing was increased.  SPO2 was 84% prior to the 6 L.  Lab workup showed elevated creatinine, elevated lactate, elevated BNP and troponins.  Chest x-ray showed pneumonia right upper lobe and some edema with small pleural effusions.  He was started on IV vancomycin, Zosyn and subsequently admitted to Medicine for further treatment and evaluation and close monitoring of hemodynamic status.  He was placed in ICU for BiPAP need.\"    Donavon was admitted to Medicine and treated for acute respiratory failure in the setting of.  Chest x-ray showed his airspace disease in the right upper lung he was treated with antibiotics, bronchodilators and steroids.  He required 2 L oxygen and was successfully weaned down to room air prior to discharge.  Chronic medical conditions were also addressed and monitored.  Labs were closely monitored.  He was discharged in stable condition with the above medication changes and/or additions. Recommendations were made to follow up " with pt's PCP in 1-2 weeks. See full inpatient plan below.     Problem list:  Acute hypoxic respiratory failure  Acute pneumonia  - Completed outpatient antibiotics without resolution of symptoms  - WBC 3.4 > 2.6  - Lactate 2.9 > 1.6  - CXR: 1. Extensive dense airspace disease in the right upper lung. This is suggestive of underlying pneumonia. Radiographic follow-up to resolution is advised. 2. A component of mild edema with small effusions and enlargement of the cardiac silhouette present  - AB.44/36/137/24.5  - Antibiotics started in ED  - Blood cultures pending  - Sputum culture if able  - Procalcitonin 2.66  - Continue antibiotics: IV zosyn, vancomycin  - Continue duonebs, steroids  - RT consult, appreciate recs  - O2 @ 2L > RA  - Keep sats >90%  - bronchial hygiene  - Follow blood cultures, WBC  --- DC home on above medication changes and additions, follow-up as needed with PCP     Chronic systolic heart failure  Complete heart block  HTN (hypertension)  Longstanding persistent atrial fibrillation (Multi)  Artificial valve  Acquired hypothyroidism  -  -Troponin 12 > 26 > 39  -Echo: : 1. The left ventricular systolic function is normal, with a visually estimated ejection fraction of 55%. 2. Spectral Doppler shows an abnormal pattern of left ventricular diastolic filling. 3. There is moderately increased posterior left ventricular wall thickness. 4. The left atrial size is severely dilated. 5. The right atrium is moderately dilated. 6. Mild to moderate tricuspid regurgitation visualized. 7. Mild aortic valve regurgitation. 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 Mild to moderately elevated pulmonary artery pressure, estimated RVSP 50mmHg. 9. There is mild to moderate mitral valve stenosis.  -EKG: Vpaced  -continue carvedilol, Entresto, Coumadin  - Daily INR for Coumadin dosing  --- Continue chronic medical therapy, follow-up as needed with PCP     BPH (benign prostatic  hyperplasia)  Retention of urine  CKD  -Creatinine 1.64 > 1.65  -UA: 1+protein, +WBC, +RBC  -Baseline creatinine >1.35 since 2022  -Botello placed for acute retention  -Will do void trial Tuesday morning, if failure can follow-up with outpatient urology  -Avoid nephrotoxins, renally dose meds as able  -Flomax started daily  --- Botello removed and he was able to void, continue Flomax for 1 week, follow-up with PCP as needed     Small lymphocytic lymphoma (Multi)  - chemo pill on hold while inpatient  --- Resume chronic medications, follow-up with oncology as needed    Pertinent Physical Exam At Time of Discharge  Physical Exam  Constitutional:       General: He is not in acute distress.     Appearance: Normal appearance. He is not toxic-appearing.      Comments: Native   HENT:      Head: Normocephalic and atraumatic.      Mouth/Throat:      Mouth: Mucous membranes are moist.   Eyes:      Extraocular Movements: Extraocular movements intact.      Pupils: Pupils are equal, round, and reactive to light.   Cardiovascular:      Rate and Rhythm: Normal rate and regular rhythm.      Pulses: Normal pulses.      Heart sounds: Normal heart sounds. No murmur heard.     No gallop.   Pulmonary:      Effort: Pulmonary effort is normal. No respiratory distress.      Breath sounds: Rhonchi present. No wheezing or rales.   Abdominal:      General: Bowel sounds are normal. There is no distension.      Palpations: Abdomen is soft.      Tenderness: There is no abdominal tenderness. There is no guarding or rebound.   Musculoskeletal:         General: No swelling, tenderness, deformity or signs of injury. Normal range of motion.      Cervical back: Normal range of motion and neck supple.      Right lower leg: No edema.      Left lower leg: No edema.   Skin:     General: Skin is warm and dry.      Capillary Refill: Capillary refill takes less than 2 seconds.      Coloration: Skin is not jaundiced.      Findings: No bruising or rash.    Neurological:      General: No focal deficit present.      Mental Status: He is alert and oriented to person, place, and time. Mental status is at baseline.      Cranial Nerves: No cranial nerve deficit.      Sensory: No sensory deficit.      Motor: No weakness.      Gait: Gait normal.   Psychiatric:         Mood and Affect: Mood normal.         Behavior: Behavior normal.         Thought Content: Thought content normal.         Judgment: Judgment normal.      Stable for discharge to home.  Total cumulative time spent in preparation of this discharge including documentation review, coordination of care with the medical team including PT/SW/care coordinators and treating consultants, discussion with patient and pertinent family members and finalization of prescriptions, follow-up appointments, and this discharge summary was approximately 45 minutes.    Outpatient Follow-Up  Future Appointments   Date Time Provider Department [place]   7/21/2025  8:30 AM INF 12 MINOFF [id number] [facility]   7/21/2025  9:45 AM GEN MOBILE ADMIN ROOM PET CT [id number] [facility]   7/21/2025 10:45 AM GEN MOBILE PET CT GENPETMOB [facility]   7/23/2025  9:00 AM Kael Chris MD [id number] [facility]   7/23/2025  9:30 AM INF 01 GENEVA GENSCCINF [facility]   7/28/2025  9:00 AM INF 12 MINOFF [id number] [facility]   8/4/2025  9:00 AM INF 12 MINOFF [id number] [facility]   8/11/2025  8:00 AM INF 12 MINOFF [id number] [facility]   8/25/2025  1:30 PM INF 12 MINOFF [id number] [facility]   9/3/2025  3:00 PM Ike Bradley MD [id number] [facility]   11/18/2025  2:40 PM Jennie Olmstead, APRN-CNP [id number] [facility]         Hanna Woods, APRN-CNP

## 2025-07-18 LAB
BACTERIA BLD CULT: NORMAL
BACTERIA BLD CULT: NORMAL
M PNEUMO IGM SER IA-ACNC: 0.02 U/L

## 2025-07-21 ENCOUNTER — HOSPITAL ENCOUNTER (OUTPATIENT)
Dept: RADIOLOGY | Facility: HOSPITAL | Age: 85
Discharge: HOME | End: 2025-07-21
Payer: MEDICARE

## 2025-07-21 ENCOUNTER — APPOINTMENT (OUTPATIENT)
Dept: HEMATOLOGY/ONCOLOGY | Facility: CLINIC | Age: 85
End: 2025-07-21
Payer: MEDICARE

## 2025-07-21 DIAGNOSIS — C83.00 SMALL LYMPHOCYTIC LYMPHOMA (MULTI): ICD-10-CM

## 2025-07-21 PROCEDURE — 78815 PET IMAGE W/CT SKULL-THIGH: CPT | Mod: PS

## 2025-07-21 PROCEDURE — 3430000001 HC RX 343 DIAGNOSTIC RADIOPHARMACEUTICALS: Performed by: INTERNAL MEDICINE

## 2025-07-21 PROCEDURE — A9552 F18 FDG: HCPCS | Performed by: INTERNAL MEDICINE

## 2025-07-21 RX ORDER — FLUDEOXYGLUCOSE F 18 200 MCI/ML
13.2 INJECTION, SOLUTION INTRAVENOUS
Status: COMPLETED | OUTPATIENT
Start: 2025-07-21 | End: 2025-07-21

## 2025-07-21 RX ADMIN — FLUDEOXYGLUCOSE F 18 13.2 MILLICURIE: 200 INJECTION, SOLUTION INTRAVENOUS at 09:26

## 2025-07-23 ENCOUNTER — OFFICE VISIT (OUTPATIENT)
Dept: HEMATOLOGY/ONCOLOGY | Facility: HOSPITAL | Age: 85
End: 2025-07-23
Payer: MEDICARE

## 2025-07-23 ENCOUNTER — APPOINTMENT (OUTPATIENT)
Dept: HEMATOLOGY/ONCOLOGY | Facility: HOSPITAL | Age: 85
End: 2025-07-23
Payer: MEDICARE

## 2025-07-23 VITALS
WEIGHT: 179.12 LBS | RESPIRATION RATE: 16 BRPM | HEIGHT: 68 IN | SYSTOLIC BLOOD PRESSURE: 136 MMHG | HEART RATE: 71 BPM | OXYGEN SATURATION: 97 % | BODY MASS INDEX: 27.15 KG/M2 | TEMPERATURE: 97 F | DIASTOLIC BLOOD PRESSURE: 62 MMHG

## 2025-07-23 DIAGNOSIS — C83.00 SMALL LYMPHOCYTIC LYMPHOMA (MULTI): Primary | ICD-10-CM

## 2025-07-23 PROCEDURE — G2211 COMPLEX E/M VISIT ADD ON: HCPCS | Performed by: INTERNAL MEDICINE

## 2025-07-23 PROCEDURE — 1111F DSCHRG MED/CURRENT MED MERGE: CPT | Performed by: INTERNAL MEDICINE

## 2025-07-23 PROCEDURE — 1126F AMNT PAIN NOTED NONE PRSNT: CPT | Performed by: INTERNAL MEDICINE

## 2025-07-23 PROCEDURE — 1159F MED LIST DOCD IN RCRD: CPT | Performed by: INTERNAL MEDICINE

## 2025-07-23 PROCEDURE — 3075F SYST BP GE 130 - 139MM HG: CPT | Performed by: INTERNAL MEDICINE

## 2025-07-23 PROCEDURE — 99215 OFFICE O/P EST HI 40 MIN: CPT | Performed by: INTERNAL MEDICINE

## 2025-07-23 PROCEDURE — 3078F DIAST BP <80 MM HG: CPT | Performed by: INTERNAL MEDICINE

## 2025-07-23 ASSESSMENT — ENCOUNTER SYMPTOMS
CARDIOVASCULAR NEGATIVE: 1
RESPIRATORY NEGATIVE: 1
GASTROINTESTINAL NEGATIVE: 1
FEVER: 0
FATIGUE: 1

## 2025-07-23 ASSESSMENT — PAIN SCALES - GENERAL: PAINLEVEL_OUTOF10: 0-NO PAIN

## 2025-07-23 NOTE — PROGRESS NOTES
Patient ID: Donavon Brush is a 84 y.o. male.    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   L eye pressure improved.   Was admitted twice for pneumonia in last 3 weeks.   Feels well now. Denies dyspnea or fever.     ONCOLOGY HISTORY:  - Had L eye pressure x 1-2 years. Worsened => CT orbit (Sep 2024): L diffuse retrobulbar soft tissue density  - MR brain/orbit (Dec 2024): diffuse loss of orbital intraconal fat and increased enhancement. Complete encasement of optic nerve without nerve atrophy  - PET/CT (Jan 2025): FDG avid LAPs in L infraclavicular, mediastinal, b/l iliac, and retropertioneal areas. Soft tissue mass posterior to L kidney. Multiple lung nodules  - L supraclavicular lN bx (Feb 2025): Low grade lymphoma. CD5 +, CD23 +. 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  - Started obinutuzumab on 4/1/25. Then venetoclax added on 4/19/25 => Pet/CT in Jul 2025: Marked response in all previous lymph noeds. Lung nodules resolved. New RLL infiltrate likely pneumonia    Assessment/Plan:  ? 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 Ki67 was 10%. PET reveals uptake in multiple LN, lungs, and possibly a kidney lesion. He required treatment.     We started him on obinutuzumab in April 2025. Tolerating it well. After 4 cycles, PET/CT shows almost complete response.   We were planning to continue with treatment but developed pneumonia 3 weeks ago. Venetoclax was held. On PET, pneumonia is still visible => We will hold treatment for 3-4 weeks. Repeat CXR then. Refer to Pulm.      On coumadin. Will monitor PLT. May need to hold coumadin if plt drops below 50K    Review Of Systems:  Review of Systems   Constitutional:  Positive for fatigue. Negative for fever.   HENT:  Negative.     Respiratory: Negative.     Cardiovascular: Negative.    Gastrointestinal: Negative.    Genitourinary: Negative.         Physical  "Exam:  /62 (BP Location: Right arm, Patient Position: Sitting, BP Cuff Size: Adult)   Pulse 71   Temp 36.1 °C (97 °F) (Temporal)   Resp 16   Ht 1.727 m (5' 8\")   Wt 81.3 kg (179 lb 2 oz)   SpO2 97%   BMI 27.24 kg/m²   BSA: 1.97 meters squared  Performance Status: Asymptomatic  Physical Exam  HENT:      Head: Normocephalic and atraumatic.     Eyes:      General: No scleral icterus.    Pulmonary:      Effort: Pulmonary effort is normal.     Musculoskeletal:         General: Normal range of motion.     Skin:     Coloration: Skin is not jaundiced.     Neurological:      General: No focal deficit present.      Mental Status: He is alert and oriented to person, place, and time.         Results:  Diagnostic Results   Lab Results   Component Value Date    WBC 2.1 (L) 07/15/2025    HGB 11.0 (L) 07/15/2025    HCT 33.6 (L) 07/15/2025    MCV 91 07/15/2025     07/15/2025     Lab Results   Component Value Date    CALCIUM 8.6 07/15/2025     07/15/2025    K 3.8 07/15/2025    CO2 25 07/15/2025     07/15/2025    BUN 27 (H) 07/15/2025    CREATININE 1.25 07/15/2025    ALT 25 07/14/2025    AST 26 07/14/2025       Current Outpatient Medications:     albuterol (Ventolin HFA) 90 mcg/actuation inhaler, Inhale 2 puffs every 4 hours if needed for wheezing or shortness of breath for up to 10 days., Disp: 8 g, Rfl: 0    carvedilol (Coreg) 25 mg tablet, TAKE ONE TABLET BY MOUTH TWO TIMES A DAY, IN THE MORNING AND LATE AFTERNOON., Disp: 180 tablet, Rfl: 0    Euthyrox 75 mcg tablet, TAKE ONE TABLET BY MOUTH ONCE DAILY AS DIRECTED., Disp: 90 tablet, Rfl: 0    obinutuzumab (Gazyva) IV in 100 mL NS, Infuse 100 mg into a venous catheter every 30 (thirty) days., Disp: , Rfl:     omega 3-dha-epa-fish oil (Fish OiL) 1,000 (120-180) mg capsule, Take 1 capsule (1,000 mg) by mouth once daily., Disp: , Rfl:     sacubitriL-valsartan (Entresto) 24-26 mg tablet, Take 1 tablet by mouth 2 times a day. (Patient taking differently: " Take 1 tablet by mouth once every morning and 1 tablet by mouth at bedtime every other night.), Disp: 60 tablet, Rfl: 5    warfarin (Coumadin) 7.5 mg tablet, TAKE ONE TABLET BY MOUTH ONCE DAILY AT BEDTIME (Patient taking differently: Take 1 tablet by mouth on Sunday, Monday, Tuesday, Thursday, and Saturday. Take 0.5 tablet by mouth on Wednesday and Friday.), Disp: 90 tablet, Rfl: 0    acyclovir (Zovirax) 400 mg tablet, Take 1 tablet (400 mg) by mouth 2 times a day. (Patient not taking: Reported on 7/23/2025), Disp: 60 tablet, Rfl: 5    tamsulosin (Flomax) 0.4 mg 24 hr capsule, Take 1 capsule (0.4 mg) by mouth once daily for 7 days. Do not crush, chew, or split. (Patient not taking: Reported on 7/23/2025), Disp: 7 capsule, Rfl: 0    venetoclax (Venclexta) 100 mg tablet, Take 2 tablets (200 mg total) by mouth once daily.  Take with food. (Patient not taking: Reported on 7/23/2025), Disp: 56 tablet, Rfl: 3     Past Surgical History:   Procedure Laterality Date    AORTIC VALVE REPLACEMENT  2003    CARDIAC ELECTROPHYSIOLOGY PROCEDURE N/A 06/23/2025    CARDIAC PACEMAKER PLACEMENT  09/22/2014    CATARACT EXTRACTION Bilateral     FRACTURE SURGERY      Shoulder    KNEE ARTHROPLASTY Left     PROSTATE SURGERY      TURP     Family History   Problem Relation Name Age of Onset    Alzheimer's disease Mother          passed at 84    No Known Problems Father          passed at 66    Heart disease Brother Enrrique     No Known Problems Daughter      No Known Problems Son      No Known Problems Son        reports that he quit smoking about 25 years ago. His smoking use included cigarettes. He has never used smokeless tobacco.    Diagnoses and all orders for this visit:  Small lymphocytic lymphoma (Multi)  -     Clinic Appointment Request  -     Clinic Appointment Request; Future  -     CBC and Auto Differential; Future  -     Comprehensive Metabolic Panel; Future  -     Lactate Dehydrogenase; Future  -     XR chest 2 views; Future  -      Referral to Pulmonology; Future         Kael Chris MD

## 2025-07-27 DIAGNOSIS — I50.22 CHRONIC SYSTOLIC HEART FAILURE: Primary | ICD-10-CM

## 2025-07-28 ENCOUNTER — APPOINTMENT (OUTPATIENT)
Dept: HEMATOLOGY/ONCOLOGY | Facility: CLINIC | Age: 85
End: 2025-07-28
Payer: MEDICARE

## 2025-07-28 LAB
ATRIAL RATE: 62 BPM
Q ONSET: 195 MS
QRS COUNT: 12 BEATS
QRS DURATION: 178 MS
QT INTERVAL: 418 MS
QTC CALCULATION(BAZETT): 451 MS
QTC FREDERICIA: 440 MS
R AXIS: 220 DEGREES
T AXIS: 114 DEGREES
T OFFSET: 404 MS
VENTRICULAR RATE: 70 BPM

## 2025-07-31 NOTE — PROGRESS NOTES
"  Pharmacy Post-Discharge Visit    Donavon Brush is a 84 y.o. male was referred to Clinical Pharmacy Team to complete a post-discharge medication optimization and monitoring visit.  The patient was referred for their Congestive Heart Failure.    Pt is here for First appointment.   Referring Provider: Josie Payne DO  PCP: Josie Payne DO, last visit: 07/09/2025, next visit: not yet scheduled    Admission Date: 07/13/2025  Discharge Date: 07/15/2025  Discharge Diagnosis: Acute Hypoxic Respiratory Failure    Subjective   HPI  PMH significant for CHF, CKD, Afib.  Special needs/barriers to therapy: None identified    Medication System Management  Patients preferred pharmacy:     [id number] [place], [place] - [address] [facility]  [address] [facility] [address]  Phone: [phone] Fax: [phone]    [facility] [address] [facility] [address] [facility]  [address] [place] [address]  Phone: [phone] Fax: [phone]     [facility]  [address] [place] [address]  Phone: [phone] Fax: [phone]      Adherence/Organization: Would miss a dose \"very rarely\"   Wife helps with organization of medications  Adverse Effects: no current concerns  Affordability/Accessibility: No current concerns    Drug Interactions  No relevant drug interactions were noted.      CONGESTIVE HEART FAILURE  Does patient follow with Cardiology: Yes - last visit was 05/20/2025 with Jennie Olmstead, next visit is 11/18/2025    Staging  Ejection Fraction: 40% (05/20/2025)    Symptom Assessment  Weight changes/edema?: No  Dyspnea?: None  Dizziness/syncope/palpitations?: No    Medication Therapy  Current Regimen (GDMT):  ARNI/ACEi/ARB: Yes - Entresto 24-26 mg twice a day  - patient is taking 1 pill in the morning and 1 pill every other evening. Patient states they discussed this regimen with their doctor  Beta Blocker: Yes - carvedilol 25mg twice daily  MRA: " "No  SGLT2i: No    Other therapy:  N/A    Previous Medications: N/A       Preventative Care  Immunizations Needed: Annual Flu, Covid, RSV, Prevnar, and Shingrix  Tobacco Use: former smoker, quit about 25 years ago     Discussion/Evaluation:  - Currently waiting to follow with Pulmonology. Has an appointment with them on August 22. Expressed frustrations about having to wait for appointment as their Hem/Onc specialist has withheld all oncology infusions until they get the all clear from Pulm. Patient was hospitalized for Pneumonia twice within the last 30 days (7/03 and 7/13).   - Would like to get 90 month supplies of their maintenance medications (carvedilol, warfarin, Euthyrox) or 1 year of refills if possible.        Objective   Allergies[1]  Social History     Social History Narrative    Not on file      Medication Review  Current Outpatient Medications   Medication Instructions    acyclovir (ZOVIRAX) 400 mg, oral, 2 times daily    albuterol (Ventolin HFA) 90 mcg/actuation inhaler 2 puffs, inhalation, Every 4 hours PRN    carvedilol (Coreg) 25 mg tablet TAKE ONE TABLET BY MOUTH TWO TIMES A DAY, IN THE MORNING AND LATE AFTERNOON.    Euthyrox 75 mcg, oral, Daily, as directed    obinutuzumab (Gazyva) IV in 100 mL  mg, Every 30 days    omega 3-dha-epa-fish oil (Fish OiL) 1,000 (120-180) mg capsule 1 capsule, Daily    sacubitriL-valsartan (Entresto) 24-26 mg tablet 1 tablet, oral, 2 times daily    Venclexta 200 mg, oral, Daily, Take with food.    warfarin (COUMADIN) 7.5 mg, oral, Nightly      Vitals  BP Readings from Last 2 Encounters:   07/23/25 136/62   07/15/25 146/66     Labs  A1C  No results found for: \"HGBA1C\"  BMP  Lab Results   Component Value Date    CALCIUM 8.6 07/15/2025     07/15/2025    K 3.8 07/15/2025    CO2 25 07/15/2025     07/15/2025    BUN 27 (H) 07/15/2025    CREATININE 1.25 07/15/2025    EGFR 57 (L) 07/15/2025     LFTs  Lab Results   Component Value Date    ALT 25 07/14/2025    " "AST 26 07/14/2025    ALKPHOS 46 07/14/2025    BILITOT 0.8 07/14/2025     FLP  Lab Results   Component Value Date    TRIG 184 (H) 09/25/2023    CHOL 193 09/25/2023    LDLF 127 (H) 09/25/2023    HDL 28.8 (A) 09/25/2023     Urine Microalbumin  No results found for: \"MICROALBCREA\"  Wt Readings from Last 3 Encounters:   07/23/25 81.3 kg (179 lb 2 oz)   07/15/25 83.6 kg (184 lb 4.9 oz)   07/09/25 83.2 kg (183 lb 8 oz)      BMI Readings from Last 1 Encounters:   07/23/25 27.24 kg/m²       Assessment/Plan   Problem List Items Addressed This Visit       Chronic systolic heart failure - Primary       Patient has HFrEF with most recent EF 40%. Patient is not on complete GDMT.  Rationale for plan: Will defer to cardiology team to manage medications for patient. No further follow up from clinical pharmacy team is needed at this time.     Medication Changes:  Will:   Continue carvedilol 25 mg twice daily    Continue Entresto 24-26 mg twice daily (1 pill every morning and 1 pill every other night)  Patient is taking this way due to Entresto potentially causing lymphoma, which is what the patient has current diagnosis of. Patient discussed this dose adjustment with their doctor and states the doctor is on board.   Reorder warfarin and Entresto for 90 day supplies with refills to patient's preferred pharmacy.    Future Considerations:  Recommend starting SGLT2 and MRA if patient can tolerate additional medications for completion of GDMT for HFrEF. Will defer all clinical decision making to cardiology team and respective pharmacists if needed.    Patient Education:  Counseled patient on relevant medication mechanisms of action, expectations, side effects, duration of therapy, contraindications, administration, and monitoring parameters.  All questions and concerns addressed. Contact pharmacist with any further questions or concerns prior to next appointment.    Clinical Pharmacist follow-up: as needed, Telehealth visit  Patient is " not followed in [facility].    Thank you,  Lashawn Lynch, PharmD, [facility]  PGY-1 Community Pharmacy Resident  Phone: [phone]  [email or web address]      Continue all meds under the continuation of care with the referring provider and clinical pharmacy team.  Verbal consent to manage patient's drug therapy was obtained from the patient. They were informed they may decline to participate or withdraw from participation in pharmacy services at any time.         [1] No Known Allergies

## 2025-08-01 ENCOUNTER — TELEMEDICINE (OUTPATIENT)
Dept: PHARMACY | Facility: HOSPITAL | Age: 85
End: 2025-08-01
Payer: MEDICARE

## 2025-08-01 DIAGNOSIS — I50.22 CHRONIC SYSTOLIC HEART FAILURE: Primary | ICD-10-CM

## 2025-08-01 DIAGNOSIS — I48.11 LONGSTANDING PERSISTENT ATRIAL FIBRILLATION (MULTI): ICD-10-CM

## 2025-08-01 DIAGNOSIS — I50.9 OTHER CONGESTIVE HEART FAILURE: ICD-10-CM

## 2025-08-01 RX ORDER — WARFARIN 7.5 MG/1
7.5 TABLET ORAL NIGHTLY
Qty: 90 TABLET | Refills: 3 | Status: SHIPPED | OUTPATIENT
Start: 2025-08-01 | End: 2026-08-01

## 2025-08-04 ENCOUNTER — APPOINTMENT (OUTPATIENT)
Dept: HEMATOLOGY/ONCOLOGY | Facility: CLINIC | Age: 85
End: 2025-08-04
Payer: MEDICARE

## 2025-08-04 DIAGNOSIS — J96.01 ACUTE HYPOXIC RESPIRATORY FAILURE: ICD-10-CM

## 2025-08-04 PROCEDURE — RXMED WILLOW AMBULATORY MEDICATION CHARGE

## 2025-08-04 RX ORDER — ALBUTEROL SULFATE 90 UG/1
2 INHALANT RESPIRATORY (INHALATION) EVERY 4 HOURS PRN
Qty: 8 G | Refills: 0 | Status: SHIPPED | OUTPATIENT
Start: 2025-08-04 | End: 2025-08-14

## 2025-08-04 NOTE — PROGRESS NOTES
Called and resolving pneumonia/no fever or chills now   Reviewed films  Rul pneumonia   Had antibiotic in the hospital  Wants albuterol refilled and this is sent  See tomorrow

## 2025-08-05 ENCOUNTER — OFFICE VISIT (OUTPATIENT)
Dept: PRIMARY CARE | Facility: CLINIC | Age: 85
End: 2025-08-05
Payer: MEDICARE

## 2025-08-05 VITALS
HEART RATE: 69 BPM | BODY MASS INDEX: 26.32 KG/M2 | SYSTOLIC BLOOD PRESSURE: 132 MMHG | DIASTOLIC BLOOD PRESSURE: 54 MMHG | OXYGEN SATURATION: 95 % | TEMPERATURE: 97.3 F | WEIGHT: 173.1 LBS

## 2025-08-05 DIAGNOSIS — N18.31 CKD STAGE G3A/A2, GFR 45-59 AND ALBUMIN CREATININE RATIO 30-299 MG/G (MULTI): ICD-10-CM

## 2025-08-05 DIAGNOSIS — I48.11 LONGSTANDING PERSISTENT ATRIAL FIBRILLATION (MULTI): ICD-10-CM

## 2025-08-05 DIAGNOSIS — C85.99 ORBITAL LYMPHOMA (MULTI): ICD-10-CM

## 2025-08-05 DIAGNOSIS — J18.9 COMMUNITY ACQUIRED PNEUMONIA OF RIGHT UPPER LOBE OF LUNG: Primary | ICD-10-CM

## 2025-08-05 PROCEDURE — 99214 OFFICE O/P EST MOD 30 MIN: CPT | Performed by: FAMILY MEDICINE

## 2025-08-05 PROCEDURE — 3075F SYST BP GE 130 - 139MM HG: CPT | Performed by: FAMILY MEDICINE

## 2025-08-05 PROCEDURE — 1159F MED LIST DOCD IN RCRD: CPT | Performed by: FAMILY MEDICINE

## 2025-08-05 PROCEDURE — 1111F DSCHRG MED/CURRENT MED MERGE: CPT | Performed by: FAMILY MEDICINE

## 2025-08-05 PROCEDURE — 3078F DIAST BP <80 MM HG: CPT | Performed by: FAMILY MEDICINE

## 2025-08-05 NOTE — PROGRESS NOTES
Subjective   Patient ID: Donavon Brush is a 84 y.o. male who presents for Hospital Follow-up (Donavon is here for hospital follow up, he went to [facility] on 7/13 to 7/15 he was diagnoses and discharge Acute hypoxic respiratory failure).  HPI  FOLLOW UP FROM HOSPITAL WITH PNEUMONIA  AND ACUTE RESPIRATORY FAILURE   SEEING ONCOLOGY WITH LYMPHOMA   HE IS DOING WELL ACTUALLY   Review of Systems   Constitutional:  Positive for fatigue. Negative for activity change, appetite change, fever and unexpected weight change.   HENT:  Negative for congestion, ear pain, postnasal drip, rhinorrhea, sinus pressure and sore throat.         HX ORBITAL LYMPHOMA    Eyes:  Negative for discharge, itching and visual disturbance.   Respiratory:  Positive for cough, chest tightness and shortness of breath. Negative for wheezing.    Cardiovascular:  Negative for chest pain, palpitations and leg swelling.   Gastrointestinal:  Negative for abdominal pain, blood in stool, constipation, diarrhea, nausea and vomiting.   Endocrine: Negative for cold intolerance, heat intolerance and polyuria.   Genitourinary:  Negative for dysuria, flank pain and hematuria.   Musculoskeletal:  Negative for arthralgias, gait problem, joint swelling and myalgias.   Skin:  Negative for rash.   Allergic/Immunologic: Negative for environmental allergies and food allergies.   Neurological:  Negative for dizziness, syncope, weakness, light-headedness, numbness and headaches.   Psychiatric/Behavioral:  Negative for dysphoric mood and sleep disturbance. The patient is not nervous/anxious.            7/15/2025     7:00 AM 7/15/2025     7:54 AM 7/15/2025     8:00 AM 7/15/2025     8:12 AM 7/15/2025    11:50 AM 7/23/2025     8:56 AM 8/5/2025     2:37 PM   Vitals   Systolic    159 146 136 132   Diastolic    68 66 62 54   BP Location    Left arm Right arm Right arm    Heart Rate 70 70 70 70 70 71 69   Temp    36.4 °C (97.5 °F) 36.4 °C (97.5 °F) 36.1 °C (97 °F) 36.3 °C (97.3 °F)  "  Resp    18 18 16    Height      1.727 m (5' 8\")    Weight (lb)      179.12 173.1   BMI      27.24 kg/m2 26.32 kg/m2   BSA (m2)      1.97 m2 1.94 m2   Visit Report      Report Report       Body mass index is 26.32 kg/m².      Objective   Physical Exam  Vitals and nursing note reviewed.   Constitutional:       Appearance: Normal appearance.   HENT:      Head: Normocephalic.     Eyes:      Comments: LEFT EYE REMOVED   CANCER   SEEING ONCOLOGY      Cardiovascular:      Rate and Rhythm: Normal rate and regular rhythm.      Pulses: Normal pulses.      Heart sounds: Normal heart sounds. No murmur heard.     No friction rub. No gallop.   Pulmonary:      Effort: Pulmonary effort is normal.      Breath sounds: Wheezing and rhonchi present.      Comments: RIGHT UPPER LOBE SOME DIMINISHED BREATH SOUNDS   WILL SEE PULMONOLOGY SOON   GET CXR PRIOR TO SEEING THEM  Abdominal:      General: Bowel sounds are normal. There is no distension.      Palpations: Abdomen is soft.      Tenderness: There is no abdominal tenderness.     Musculoskeletal:         General: No deformity. Normal range of motion.     Skin:     General: Skin is warm and dry.      Capillary Refill: Capillary refill takes less than 2 seconds.     Neurological:      General: No focal deficit present.      Mental Status: He is alert and oriented to person, place, and time.     Psychiatric:         Mood and Affect: Mood normal.         Assessment/Plan   Problem List Items Addressed This Visit       CKD stage G3a/A2, GFR 45-59 and albumin creatinine ratio  mg/g (Multi)    Longstanding persistent atrial fibrillation (Multi)    Orbital lymphoma (Multi)    Community acquired pneumonia of right upper lobe of lung - Primary    Relevant Orders    XR chest 2 views          Josie Payne DO     "

## 2025-08-08 ENCOUNTER — PHARMACY VISIT (OUTPATIENT)
Dept: PHARMACY | Facility: CLINIC | Age: 85
End: 2025-08-08
Payer: COMMERCIAL

## 2025-08-08 PROBLEM — J18.9 COMMUNITY ACQUIRED PNEUMONIA OF RIGHT UPPER LOBE OF LUNG: Status: ACTIVE | Noted: 2025-08-08

## 2025-08-08 LAB
Q ONSET: 222 MS
QRS COUNT: 12 BEATS
QRS DURATION: 84 MS
QT INTERVAL: 348 MS
QTC CALCULATION(BAZETT): 383 MS
QTC FREDERICIA: 371 MS
R AXIS: -7 DEGREES
T AXIS: 105 DEGREES
T OFFSET: 396 MS
VENTRICULAR RATE: 73 BPM

## 2025-08-08 ASSESSMENT — ENCOUNTER SYMPTOMS
FEVER: 0
VOMITING: 0
LIGHT-HEADEDNESS: 0
APPETITE CHANGE: 0
FATIGUE: 1
ARTHRALGIAS: 0
ACTIVITY CHANGE: 0
DYSURIA: 0
WHEEZING: 0
CHEST TIGHTNESS: 1
DYSPHORIC MOOD: 0
DIZZINESS: 0
ABDOMINAL PAIN: 0
SLEEP DISTURBANCE: 0
NERVOUS/ANXIOUS: 0
EYE ITCHING: 0
HEADACHES: 0
BLOOD IN STOOL: 0
PALPITATIONS: 0
EYE DISCHARGE: 0
COUGH: 1
SINUS PRESSURE: 0
NAUSEA: 0
DIARRHEA: 0
MYALGIAS: 0
SORE THROAT: 0
HEMATURIA: 0
FLANK PAIN: 0
UNEXPECTED WEIGHT CHANGE: 0
RHINORRHEA: 0
SHORTNESS OF BREATH: 1
CONSTIPATION: 0
NUMBNESS: 0
WEAKNESS: 0
JOINT SWELLING: 0

## 2025-08-11 ENCOUNTER — APPOINTMENT (OUTPATIENT)
Dept: HEMATOLOGY/ONCOLOGY | Facility: CLINIC | Age: 85
End: 2025-08-11
Payer: MEDICARE

## 2025-08-12 ENCOUNTER — HOSPITAL ENCOUNTER (OUTPATIENT)
Dept: RADIOLOGY | Facility: HOSPITAL | Age: 85
Discharge: HOME | End: 2025-08-12
Payer: MEDICARE

## 2025-08-12 DIAGNOSIS — J18.9 COMMUNITY ACQUIRED PNEUMONIA OF RIGHT UPPER LOBE OF LUNG: ICD-10-CM

## 2025-08-12 PROCEDURE — 71046 X-RAY EXAM CHEST 2 VIEWS: CPT | Performed by: RADIOLOGY

## 2025-08-12 PROCEDURE — 71046 X-RAY EXAM CHEST 2 VIEWS: CPT

## 2025-08-22 ENCOUNTER — APPOINTMENT (OUTPATIENT)
Dept: PULMONOLOGY | Facility: CLINIC | Age: 85
End: 2025-08-22
Payer: MEDICARE

## 2025-08-22 VITALS
BODY MASS INDEX: 26.15 KG/M2 | WEIGHT: 172 LBS | DIASTOLIC BLOOD PRESSURE: 48 MMHG | OXYGEN SATURATION: 93 % | HEART RATE: 70 BPM | SYSTOLIC BLOOD PRESSURE: 109 MMHG

## 2025-08-22 DIAGNOSIS — C83.00 SMALL LYMPHOCYTIC LYMPHOMA (MULTI): ICD-10-CM

## 2025-08-22 DIAGNOSIS — I50.22 CHRONIC SYSTOLIC HEART FAILURE: ICD-10-CM

## 2025-08-22 DIAGNOSIS — J18.9 PNEUMONIA OF RIGHT UPPER LOBE DUE TO INFECTIOUS ORGANISM: Primary | ICD-10-CM

## 2025-08-22 DIAGNOSIS — R06.2 WHEEZE: ICD-10-CM

## 2025-08-22 PROCEDURE — 1160F RVW MEDS BY RX/DR IN RCRD: CPT | Performed by: PEDIATRICS

## 2025-08-22 PROCEDURE — 3074F SYST BP LT 130 MM HG: CPT | Performed by: PEDIATRICS

## 2025-08-22 PROCEDURE — 99205 OFFICE O/P NEW HI 60 MIN: CPT | Performed by: PEDIATRICS

## 2025-08-22 PROCEDURE — 3078F DIAST BP <80 MM HG: CPT | Performed by: PEDIATRICS

## 2025-08-22 PROCEDURE — 1159F MED LIST DOCD IN RCRD: CPT | Performed by: PEDIATRICS

## 2025-08-22 RX ORDER — IBUPROFEN 100 MG/5ML
1000 SUSPENSION, ORAL (FINAL DOSE FORM) ORAL DAILY
COMMUNITY

## 2025-08-22 RX ORDER — BUDESONIDE 0.5 MG/2ML
0.5 INHALANT ORAL
Qty: 120 ML | Refills: 11 | Status: SHIPPED | OUTPATIENT
Start: 2025-08-22

## 2025-08-22 RX ORDER — ALBUTEROL SULFATE 0.83 MG/ML
2.5 SOLUTION RESPIRATORY (INHALATION) 4 TIMES DAILY PRN
Qty: 270 ML | Refills: 11 | Status: SHIPPED | OUTPATIENT
Start: 2025-08-22 | End: 2026-08-22

## 2025-08-22 RX ORDER — GOLDENSEAL 350 MG
CAPSULE ORAL
COMMUNITY

## 2025-08-25 ENCOUNTER — APPOINTMENT (OUTPATIENT)
Dept: HEMATOLOGY/ONCOLOGY | Facility: HOSPITAL | Age: 85
End: 2025-08-25
Payer: MEDICARE

## 2025-08-25 ENCOUNTER — TELEPHONE (OUTPATIENT)
Dept: PULMONOLOGY | Facility: CLINIC | Age: 85
End: 2025-08-25
Payer: MEDICARE

## 2025-08-25 ENCOUNTER — APPOINTMENT (OUTPATIENT)
Dept: HEMATOLOGY/ONCOLOGY | Facility: CLINIC | Age: 85
End: 2025-08-25
Payer: MEDICARE

## 2025-08-25 DIAGNOSIS — J18.9 PNEUMONIA OF RIGHT UPPER LOBE DUE TO INFECTIOUS ORGANISM: ICD-10-CM

## 2025-08-25 DIAGNOSIS — R06.2 WHEEZE: ICD-10-CM

## 2025-08-25 RX ORDER — ALBUTEROL SULFATE 0.83 MG/ML
2.5 SOLUTION RESPIRATORY (INHALATION) 4 TIMES DAILY PRN
Qty: 270 ML | Refills: 11 | Status: SHIPPED | OUTPATIENT
Start: 2025-08-25 | End: 2025-08-25 | Stop reason: SDUPTHER

## 2025-08-25 RX ORDER — ALBUTEROL SULFATE 0.83 MG/ML
2.5 SOLUTION RESPIRATORY (INHALATION) 4 TIMES DAILY PRN
Qty: 270 ML | Refills: 11 | Status: SHIPPED | OUTPATIENT
Start: 2025-08-25 | End: 2026-08-25

## 2025-08-26 ENCOUNTER — OFFICE VISIT (OUTPATIENT)
Dept: HEMATOLOGY/ONCOLOGY | Facility: HOSPITAL | Age: 85
End: 2025-08-26
Payer: MEDICARE

## 2025-08-26 ENCOUNTER — LAB (OUTPATIENT)
Dept: LAB | Facility: HOSPITAL | Age: 85
End: 2025-08-26
Payer: MEDICARE

## 2025-08-26 VITALS
WEIGHT: 171.63 LBS | RESPIRATION RATE: 16 BRPM | BODY MASS INDEX: 26.94 KG/M2 | HEIGHT: 67 IN | HEART RATE: 70 BPM | TEMPERATURE: 95.7 F | SYSTOLIC BLOOD PRESSURE: 100 MMHG | DIASTOLIC BLOOD PRESSURE: 54 MMHG | OXYGEN SATURATION: 97 %

## 2025-08-26 DIAGNOSIS — C83.00 SMALL LYMPHOCYTIC LYMPHOMA (MULTI): Primary | ICD-10-CM

## 2025-08-26 LAB
ALBUMIN SERPL BCP-MCNC: 3.4 G/DL (ref 3.4–5)
ALP SERPL-CCNC: 63 U/L (ref 33–136)
ALT SERPL W P-5'-P-CCNC: 16 U/L (ref 10–52)
ANION GAP SERPL CALC-SCNC: 12 MMOL/L (ref 10–20)
AST SERPL W P-5'-P-CCNC: 17 U/L (ref 9–39)
BASOPHILS # BLD AUTO: 0.06 X10*3/UL (ref 0–0.1)
BASOPHILS NFR BLD AUTO: 1.1 %
BILIRUB SERPL-MCNC: 0.6 MG/DL (ref 0–1.2)
BUN SERPL-MCNC: 39 MG/DL (ref 6–23)
CALCIUM SERPL-MCNC: 9.4 MG/DL (ref 8.6–10.3)
CHLORIDE SERPL-SCNC: 101 MMOL/L (ref 98–107)
CO2 SERPL-SCNC: 31 MMOL/L (ref 21–32)
CREAT SERPL-MCNC: 1.61 MG/DL (ref 0.5–1.3)
DACRYOCYTES BLD QL SMEAR: NORMAL
EGFRCR SERPLBLD CKD-EPI 2021: 42 ML/MIN/1.73M*2
EOSINOPHIL # BLD AUTO: 0.25 X10*3/UL (ref 0–0.4)
EOSINOPHIL NFR BLD AUTO: 4.5 %
ERYTHROCYTE [DISTWIDTH] IN BLOOD BY AUTOMATED COUNT: 15 % (ref 11.5–14.5)
GLUCOSE SERPL-MCNC: 114 MG/DL (ref 74–99)
HCT VFR BLD AUTO: 35.3 % (ref 41–52)
HGB BLD-MCNC: 11.1 G/DL (ref 13.5–17.5)
IMM GRANULOCYTES # BLD AUTO: 0.29 X10*3/UL (ref 0–0.5)
IMM GRANULOCYTES NFR BLD AUTO: 5.2 % (ref 0–0.9)
LDH SERPL L TO P-CCNC: 277 U/L (ref 84–246)
LYMPHOCYTES # BLD AUTO: 0.69 X10*3/UL (ref 0.8–3)
LYMPHOCYTES NFR BLD AUTO: 12.4 %
MCH RBC QN AUTO: 27.3 PG (ref 26–34)
MCHC RBC AUTO-ENTMCNC: 31.4 G/DL (ref 32–36)
MCV RBC AUTO: 87 FL (ref 80–100)
MONOCYTES # BLD AUTO: 1.02 X10*3/UL (ref 0.05–0.8)
MONOCYTES NFR BLD AUTO: 18.3 %
NEUTROPHILS # BLD AUTO: 3.26 X10*3/UL (ref 1.6–5.5)
NEUTROPHILS NFR BLD AUTO: 58.5 %
NRBC BLD-RTO: 0 /100 WBCS (ref 0–0)
OVALOCYTES BLD QL SMEAR: NORMAL
PLATELET # BLD AUTO: 275 X10*3/UL (ref 150–450)
POLYCHROMASIA BLD QL SMEAR: NORMAL
POTASSIUM SERPL-SCNC: 4.4 MMOL/L (ref 3.5–5.3)
PROT SERPL-MCNC: 6.4 G/DL (ref 6.4–8.2)
RBC # BLD AUTO: 4.07 X10*6/UL (ref 4.5–5.9)
RBC MORPH BLD: NORMAL
SODIUM SERPL-SCNC: 140 MMOL/L (ref 136–145)
WBC # BLD AUTO: 5.6 X10*3/UL (ref 4.4–11.3)

## 2025-08-26 PROCEDURE — 83615 LACTATE (LD) (LDH) ENZYME: CPT

## 2025-08-26 PROCEDURE — 3078F DIAST BP <80 MM HG: CPT | Performed by: INTERNAL MEDICINE

## 2025-08-26 PROCEDURE — 3074F SYST BP LT 130 MM HG: CPT | Performed by: INTERNAL MEDICINE

## 2025-08-26 PROCEDURE — 80053 COMPREHEN METABOLIC PANEL: CPT

## 2025-08-26 PROCEDURE — G2211 COMPLEX E/M VISIT ADD ON: HCPCS | Performed by: INTERNAL MEDICINE

## 2025-08-26 PROCEDURE — 85025 COMPLETE CBC W/AUTO DIFF WBC: CPT

## 2025-08-26 PROCEDURE — 1159F MED LIST DOCD IN RCRD: CPT | Performed by: INTERNAL MEDICINE

## 2025-08-26 PROCEDURE — 1126F AMNT PAIN NOTED NONE PRSNT: CPT | Performed by: INTERNAL MEDICINE

## 2025-08-26 PROCEDURE — 99215 OFFICE O/P EST HI 40 MIN: CPT | Performed by: INTERNAL MEDICINE

## 2025-08-26 ASSESSMENT — ENCOUNTER SYMPTOMS
FATIGUE: 1
CARDIOVASCULAR NEGATIVE: 1
GASTROINTESTINAL NEGATIVE: 1
RESPIRATORY NEGATIVE: 1
FEVER: 0

## 2025-08-26 ASSESSMENT — PAIN SCALES - GENERAL: PAINLEVEL_OUTOF10: 0-NO PAIN

## 2025-08-27 ENCOUNTER — APPOINTMENT (OUTPATIENT)
Dept: HEMATOLOGY/ONCOLOGY | Facility: HOSPITAL | Age: 85
End: 2025-08-27
Payer: MEDICARE

## 2025-08-30 ENCOUNTER — APPOINTMENT (OUTPATIENT)
Dept: CARDIOLOGY | Facility: HOSPITAL | Age: 85
End: 2025-08-30
Payer: MEDICARE

## 2025-08-30 ENCOUNTER — HOSPITAL ENCOUNTER (EMERGENCY)
Facility: HOSPITAL | Age: 85
Discharge: HOME | End: 2025-08-30
Attending: EMERGENCY MEDICINE
Payer: MEDICARE

## 2025-08-30 ASSESSMENT — PAIN SCALES - GENERAL
PAINLEVEL_OUTOF10: 0 - NO PAIN
PAINLEVEL_OUTOF10: 0 - NO PAIN

## 2025-08-30 ASSESSMENT — PAIN - FUNCTIONAL ASSESSMENT: PAIN_FUNCTIONAL_ASSESSMENT: 0-10

## 2025-09-03 ENCOUNTER — TELEPHONE (OUTPATIENT)
Dept: PULMONOLOGY | Facility: CLINIC | Age: 85
End: 2025-09-03

## 2025-09-03 ENCOUNTER — APPOINTMENT (OUTPATIENT)
Dept: OPHTHALMOLOGY | Facility: CLINIC | Age: 85
End: 2025-09-03
Payer: MEDICARE

## 2025-09-04 ENCOUNTER — TELEPHONE (OUTPATIENT)
Dept: HEMATOLOGY/ONCOLOGY | Facility: HOSPITAL | Age: 85
End: 2025-09-04
Payer: MEDICARE

## 2025-09-04 DIAGNOSIS — R06.2 WHEEZE: ICD-10-CM

## 2025-09-04 DIAGNOSIS — J18.9 PNEUMONIA OF RIGHT UPPER LOBE DUE TO INFECTIOUS ORGANISM: ICD-10-CM

## 2025-09-04 RX ORDER — BUDESONIDE 0.5 MG/2ML
0.5 INHALANT ORAL
Qty: 120 ML | Refills: 11 | Status: SHIPPED | OUTPATIENT
Start: 2025-09-04

## 2025-09-08 ENCOUNTER — APPOINTMENT (OUTPATIENT)
Dept: HEMATOLOGY/ONCOLOGY | Facility: HOSPITAL | Age: 85
End: 2025-09-08
Payer: MEDICARE

## 2025-09-10 ENCOUNTER — HOSPITAL ENCOUNTER (OUTPATIENT)
Dept: RADIOLOGY | Facility: HOSPITAL | Age: 85
End: 2025-09-10
Payer: MEDICARE

## 2025-09-26 ENCOUNTER — APPOINTMENT (OUTPATIENT)
Dept: PRIMARY CARE | Facility: CLINIC | Age: 85
End: 2025-09-26
Payer: MEDICARE

## 2025-10-15 ENCOUNTER — APPOINTMENT (OUTPATIENT)
Dept: PULMONOLOGY | Facility: CLINIC | Age: 85
End: 2025-10-15
Payer: MEDICARE

## 2026-01-07 ENCOUNTER — APPOINTMENT (OUTPATIENT)
Dept: OPHTHALMOLOGY | Facility: CLINIC | Age: 86
End: 2026-01-07
Payer: MEDICARE

## (undated) DEVICE — Device

## (undated) DEVICE — GLOVE, SURGICAL, PROTEXIS PI , 7.5, PF, LF

## (undated) DEVICE — PAD, ELECTRODE DEFIB PADPRO ADULT STRL W/ADAPTER

## (undated) DEVICE — DRESSING, GAUZE, 4 X 4 IN, LF, STERILE

## (undated) DEVICE — NEEDLE, MICRODISSECTION STR 3CM

## (undated) DEVICE — CORD, BIPOLAR,  12 FT, DISPOSABLE, LF

## (undated) DEVICE — NEEDLE, ELECTRODE, ELECTROSURGICAL, INSULATED

## (undated) DEVICE — APPLICATOR, COTTON TIP, 3 IN, WOOD, STERILE

## (undated) DEVICE — SYRINGE, 10 CC, LUER LOCK

## (undated) DEVICE — NEEDLE, HYPODERMIC, REGULAR WALL, REGULAR BEVEL, 30 G X 1 IN

## (undated) DEVICE — BLADE, SURGICAL, POLYMER COATED P15, STERILE, DISP

## (undated) DEVICE — SUTURE, STRATAFIX, 4-0, MONOCRYL PLUS, PS-2 30CM, UNDYED

## (undated) DEVICE — ENVELOPE, ANTIBACTERIAL, AIGIS RX TYRX, ABSORBABLE, MED

## (undated) DEVICE — SUTURE, STRATAFIX, 3-0, SPIRAL MONOCRYL PLUS, UNDYED 20CM  SH

## (undated) DEVICE — FRAZIER SUCTION, 10 FR.